# Patient Record
Sex: FEMALE | Race: BLACK OR AFRICAN AMERICAN | NOT HISPANIC OR LATINO | Employment: UNEMPLOYED | URBAN - METROPOLITAN AREA
[De-identification: names, ages, dates, MRNs, and addresses within clinical notes are randomized per-mention and may not be internally consistent; named-entity substitution may affect disease eponyms.]

---

## 2018-03-12 ENCOUNTER — TELEPHONE (OUTPATIENT)
Dept: FAMILY MEDICINE | Facility: CLINIC | Age: 53
End: 2018-03-12

## 2018-03-12 NOTE — TELEPHONE ENCOUNTER
Please send oxibutin chloride 5mg   omeprozole 20mg  hct 12mg  avastatin 10mg to [facility] pharmacy [address] [facility] [phone]

## 2018-03-14 DIAGNOSIS — I10 ESSENTIAL HYPERTENSION: Primary | ICD-10-CM

## 2018-03-15 RX ORDER — HYDROCHLOROTHIAZIDE 12.5 MG/1
12.5 CAPSULE ORAL DAILY
COMMUNITY
Start: 2018-01-02 | End: 2018-03-16 | Stop reason: SDUPTHER

## 2018-03-15 RX ORDER — HYDROCHLOROTHIAZIDE 12.5 MG/1
12.5 TABLET ORAL DAILY
Qty: 90 TABLET | Refills: 1 | Status: CANCELLED | OUTPATIENT
Start: 2018-03-15 | End: 2018-09-11

## 2018-03-16 PROBLEM — E66.9 OBESITY: Status: ACTIVE | Noted: 2017-12-04

## 2018-03-16 PROBLEM — K21.9 GASTROESOPHAGEAL REFLUX DISEASE: Status: ACTIVE | Noted: 2017-12-04

## 2018-03-16 PROBLEM — E78.2 MIXED HYPERLIPIDEMIA: Status: ACTIVE | Noted: 2017-12-04

## 2018-03-16 RX ORDER — OXYBUTYNIN CHLORIDE 5 MG/1
5 TABLET ORAL 2 TIMES DAILY
COMMUNITY
Start: 2018-01-02 | End: 2018-03-16 | Stop reason: SDUPTHER

## 2018-03-16 RX ORDER — OMEPRAZOLE 20 MG/1
20 CAPSULE, DELAYED RELEASE ORAL DAILY
COMMUNITY
Start: 2018-01-03 | End: 2018-03-16 | Stop reason: SDUPTHER

## 2018-03-16 RX ORDER — OMEPRAZOLE 20 MG/1
20 CAPSULE, DELAYED RELEASE ORAL DAILY
Qty: 90 CAPSULE | Refills: 1 | Status: SHIPPED | OUTPATIENT
Start: 2018-03-16 | End: 2018-05-14

## 2018-03-16 RX ORDER — HYDROCHLOROTHIAZIDE 12.5 MG/1
12.5 CAPSULE ORAL DAILY
Qty: 90 CAPSULE | Refills: 1 | Status: SHIPPED | OUTPATIENT
Start: 2018-03-16 | End: 2018-05-14

## 2018-03-16 RX ORDER — ATORVASTATIN CALCIUM 10 MG/1
10 TABLET, FILM COATED ORAL DAILY
COMMUNITY
Start: 2018-01-02 | End: 2018-03-16 | Stop reason: SDUPTHER

## 2018-03-16 RX ORDER — ATORVASTATIN CALCIUM 10 MG/1
10 TABLET, FILM COATED ORAL DAILY
Qty: 90 TABLET | Refills: 3 | Status: SHIPPED | OUTPATIENT
Start: 2018-03-16 | End: 2018-04-25 | Stop reason: SDUPTHER

## 2018-03-16 RX ORDER — OXYBUTYNIN CHLORIDE 5 MG/1
5 TABLET ORAL 2 TIMES DAILY
Qty: 90 TABLET | Refills: 1 | Status: SHIPPED | OUTPATIENT
Start: 2018-03-16 | End: 2018-04-25 | Stop reason: SDUPTHER

## 2018-03-16 NOTE — TELEPHONE ENCOUNTER
Pt uses [facility] [phone] and she needs oxiburtin,omeprezole,avastatin and hctz

## 2018-03-16 NOTE — TELEPHONE ENCOUNTER
Patient requesting refills on multiple(4) medications. Please see message from Adina for medication details

## 2018-03-27 DIAGNOSIS — I10 HYPERTENSION, UNSPECIFIED TYPE: Primary | ICD-10-CM

## 2018-03-27 RX ORDER — FUROSEMIDE 20 MG/1
20 TABLET ORAL
COMMUNITY
Start: 2018-01-02 | End: 2018-03-27 | Stop reason: SDUPTHER

## 2018-03-27 RX ORDER — FUROSEMIDE 20 MG/1
20 TABLET ORAL DAILY
Qty: 30 TABLET | Refills: 0 | Status: SHIPPED | OUTPATIENT
Start: 2018-03-27 | End: 2018-04-25 | Stop reason: SDUPTHER

## 2018-03-27 NOTE — TELEPHONE ENCOUNTER
Received call from [employer] for refill.  Renewed patients lasix for 30 days.  She has an appointment to see you 04/10/2018 at 11:15am

## 2018-04-02 ENCOUNTER — TELEPHONE (OUTPATIENT)
Dept: FAMILY MEDICINE | Facility: CLINIC | Age: 53
End: 2018-04-02

## 2018-04-03 DIAGNOSIS — M25.561 PAIN IN BOTH KNEES, UNSPECIFIED CHRONICITY: Primary | ICD-10-CM

## 2018-04-03 DIAGNOSIS — M25.562 PAIN IN BOTH KNEES, UNSPECIFIED CHRONICITY: Primary | ICD-10-CM

## 2018-04-10 PROBLEM — I10 HYPERTENSION: Status: ACTIVE | Noted: 2017-12-04

## 2018-04-10 PROBLEM — M19.90 OSTEOARTHRITIS: Status: ACTIVE | Noted: 2017-12-04

## 2018-04-10 PROBLEM — F32.A DEPRESSION: Status: ACTIVE | Noted: 2017-12-04

## 2018-04-10 PROBLEM — Z91.09 ENVIRONMENTAL ALLERGIES: Status: ACTIVE | Noted: 2017-12-04

## 2018-04-10 PROBLEM — F41.9 ANXIETY: Status: ACTIVE | Noted: 2017-12-04

## 2018-04-10 PROBLEM — D64.9 ANEMIA: Status: ACTIVE | Noted: 2017-12-04

## 2018-04-10 RX ORDER — SUMATRIPTAN SUCCINATE 50 MG/1
50 TABLET ORAL DAILY PRN
COMMUNITY
Start: 2017-12-18 | End: 2018-05-14

## 2018-04-10 RX ORDER — CELECOXIB 200 MG/1
200 CAPSULE ORAL
COMMUNITY
Start: 2017-12-14 | End: 2018-05-14

## 2018-04-10 RX ORDER — ARIPIPRAZOLE 20 MG/1
1 TABLET ORAL DAILY
COMMUNITY
Start: 2017-12-11 | End: 2018-05-14

## 2018-04-24 DIAGNOSIS — I10 HYPERTENSION, UNSPECIFIED TYPE: ICD-10-CM

## 2018-04-25 RX ORDER — FUROSEMIDE 20 MG/1
TABLET ORAL
Qty: 30 TABLET | Refills: 0 | Status: SHIPPED | OUTPATIENT
Start: 2018-04-25 | End: 2018-05-14

## 2018-04-25 RX ORDER — ATORVASTATIN CALCIUM 10 MG/1
TABLET, FILM COATED ORAL
Qty: 30 TABLET | Refills: 0 | Status: SHIPPED | OUTPATIENT
Start: 2018-04-25 | End: 2018-05-14 | Stop reason: SDUPTHER

## 2018-04-25 RX ORDER — CITALOPRAM 10 MG/1
TABLET ORAL
COMMUNITY
End: 2018-05-14

## 2018-04-25 RX ORDER — OXYBUTYNIN CHLORIDE 5 MG/1
5 TABLET ORAL 2 TIMES DAILY
Qty: 90 TABLET | Refills: 0 | Status: SHIPPED | OUTPATIENT
Start: 2018-04-25 | End: 2018-05-14

## 2018-05-14 ENCOUNTER — OFFICE VISIT (OUTPATIENT)
Dept: FAMILY MEDICINE | Facility: CLINIC | Age: 53
End: 2018-05-14
Payer: MEDICARE

## 2018-05-14 VITALS
HEART RATE: 103 BPM | WEIGHT: 293 LBS | BODY MASS INDEX: 44.41 KG/M2 | DIASTOLIC BLOOD PRESSURE: 88 MMHG | SYSTOLIC BLOOD PRESSURE: 122 MMHG | OXYGEN SATURATION: 97 % | HEIGHT: 68 IN | TEMPERATURE: 98.2 F

## 2018-05-14 DIAGNOSIS — G43.109 MIGRAINE WITH AURA AND WITHOUT STATUS MIGRAINOSUS, NOT INTRACTABLE: ICD-10-CM

## 2018-05-14 DIAGNOSIS — M79.89 SWELLING OF BOTH LOWER EXTREMITIES: ICD-10-CM

## 2018-05-14 DIAGNOSIS — F51.5 NIGHTMARES: ICD-10-CM

## 2018-05-14 DIAGNOSIS — K21.9 GASTROESOPHAGEAL REFLUX DISEASE, ESOPHAGITIS PRESENCE NOT SPECIFIED: ICD-10-CM

## 2018-05-14 DIAGNOSIS — F41.9 ANXIETY: ICD-10-CM

## 2018-05-14 DIAGNOSIS — F20.9 SCHIZOPHRENIA, UNSPECIFIED TYPE: ICD-10-CM

## 2018-05-14 DIAGNOSIS — I10 ESSENTIAL HYPERTENSION: Primary | ICD-10-CM

## 2018-05-14 DIAGNOSIS — Z12.11 SCREENING FOR COLORECTAL CANCER: ICD-10-CM

## 2018-05-14 DIAGNOSIS — Z12.12 SCREENING FOR COLORECTAL CANCER: ICD-10-CM

## 2018-05-14 DIAGNOSIS — Z80.0 FAMILY HISTORY OF COLON CANCER IN MOTHER: ICD-10-CM

## 2018-05-14 DIAGNOSIS — N32.81 OVERACTIVE BLADDER: ICD-10-CM

## 2018-05-14 DIAGNOSIS — F31.10: ICD-10-CM

## 2018-05-14 DIAGNOSIS — F43.10 PTSD (POST-TRAUMATIC STRESS DISORDER): ICD-10-CM

## 2018-05-14 DIAGNOSIS — E78.00 HYPERCHOLESTEROLEMIA: ICD-10-CM

## 2018-05-14 PROCEDURE — 99215 OFFICE O/P EST HI 40 MIN: CPT | Performed by: FAMILY MEDICINE

## 2018-05-14 RX ORDER — QUETIAPINE FUMARATE 100 MG/1
100 TABLET, FILM COATED ORAL 3 TIMES DAILY
COMMUNITY
End: 2018-07-03 | Stop reason: SDUPTHER

## 2018-05-14 RX ORDER — ARIPIPRAZOLE 20 MG/1
20 TABLET ORAL DAILY
Start: 2018-05-14 | End: 2018-07-03 | Stop reason: SDUPTHER

## 2018-05-14 RX ORDER — QUETIAPINE FUMARATE 50 MG/1
50 TABLET, FILM COATED ORAL 3 TIMES DAILY
COMMUNITY
End: 2018-07-03 | Stop reason: SDUPTHER

## 2018-05-14 RX ORDER — OXYBUTYNIN CHLORIDE 5 MG/1
5 TABLET ORAL 2 TIMES DAILY
Qty: 90 TABLET | Refills: 0
Start: 2018-05-14 | End: 2018-06-04

## 2018-05-14 RX ORDER — LOSARTAN POTASSIUM 50 MG/1
50 TABLET ORAL DAILY
Qty: 30 TABLET | Refills: 5 | Status: SHIPPED | OUTPATIENT
Start: 2018-05-14 | End: 2018-06-04

## 2018-05-14 RX ORDER — VENLAFAXINE HYDROCHLORIDE 150 MG/1
150 CAPSULE, EXTENDED RELEASE ORAL DAILY
COMMUNITY
End: 2018-07-03 | Stop reason: SDUPTHER

## 2018-05-14 RX ORDER — OMEPRAZOLE 20 MG/1
20 CAPSULE, DELAYED RELEASE ORAL
Qty: 90 CAPSULE | Refills: 1
Start: 2018-05-14 | End: 2018-06-04

## 2018-05-14 RX ORDER — PRAZOSIN HYDROCHLORIDE 2 MG/1
4 CAPSULE ORAL NIGHTLY
COMMUNITY
End: 2018-07-03 | Stop reason: SDUPTHER

## 2018-05-14 RX ORDER — ATORVASTATIN CALCIUM 10 MG/1
10 TABLET, FILM COATED ORAL DAILY
Qty: 30 TABLET | Refills: 0
Start: 2018-05-14 | End: 2018-06-04

## 2018-05-14 ASSESSMENT — ENCOUNTER SYMPTOMS
LIGHT-HEADEDNESS: 0
DIZZINESS: 0
WHEEZING: 0
ADENOPATHY: 0
UNEXPECTED WEIGHT CHANGE: 0
ABDOMINAL PAIN: 0
PALPITATIONS: 0
VOMITING: 0
DIARRHEA: 0
BLOOD IN STOOL: 0
HEADACHES: 0
TROUBLE SWALLOWING: 0
COUGH: 0
WEAKNESS: 0
NAUSEA: 0
FATIGUE: 0
CONSTIPATION: 0
SHORTNESS OF BREATH: 0
DIFFICULTY URINATING: 0
FEVER: 0
ABDOMINAL DISTENTION: 0

## 2018-05-14 ASSESSMENT — PAIN SCALES - GENERAL: PAINLEVEL: 0-NO PAIN

## 2018-05-14 NOTE — PROGRESS NOTES
Daily Progress Note       Patient ID: Kar Nicholas is a 53 y.o. female.    HPI    53 year old presents to [facility] care.     Hx of Schizophrenia, Bipolar - Manic, PTSD and Anxiety - will be following up at [place] - awaiting appt at this time. Had been at [facility]. Currently on abilify, seroquel and effexor XR - tolerating medications with no complaints. States doing well on current therapy.     Hypertension - currently on HCTZ 12.5mg once daily and furosemide 20 mg daily - tolerating with no complaints. Does not check BP Medication at home     Bilateral Lower Extremity Swelling - currently on HCTZ and furosemide - states that lower extremity swelling had been present after a fall. States that the swelling has since resolved. Unsure if she still requires diuretic therapy.     Hypercholesterolemia - currently on atorvastatin 10 mg qhs - tolerating with no complaints.     GERD - currently on omeprazole 20mg daily - tolerating with no complaints. Recently had been off the medication because she had no refills and states the Reflux symptoms were significant. States doing much better since being back on the medication     Migraines - reports well controlled at this time - usually will take advil with onset and take imitrex if significant. Reports last migraine was about 1-2 months ago. Had previously been 2-3x/week. Usually located frontal or occipital areas - throbbing sensation. + Light and sound sensitivity and Nausea. Relieved by laying in dark quiet room. + Aura    Overactive Bladder - currently on oxybutynin 5mg twice daily with improvement in symptoms     Nightmares - currently on prazosin with improvement in nightmares.     Hx of alcohol and drug abuse. States has been alcohol free since October 31, 2016 and drug free since November 11, 2016. Was on cocaine     Family hx of Colon Cancer in Mother at the age of 56. Last colonoscopy was in 2006    Due for GYN  exam. States last mammogram was done last year     The following have been reviewed and updated as appropriate in this visit:  Tobacco  Allergies  Meds  Problems  Med Hx  Surg Hx  Fam Hx       Review of Systems   Constitutional: Negative for fatigue, fever and unexpected weight change.   HENT: Negative for trouble swallowing.    Eyes: Negative for visual disturbance.   Respiratory: Negative for cough, shortness of breath and wheezing.    Cardiovascular: Negative for chest pain, palpitations and leg swelling.   Gastrointestinal: Negative for abdominal distention, abdominal pain, blood in stool, constipation, diarrhea, nausea and vomiting.   Endocrine: Negative for cold intolerance and heat intolerance.   Genitourinary: Negative for difficulty urinating.   Skin: Negative for rash.   Neurological: Negative for dizziness, syncope, weakness, light-headedness and headaches.   Hematological: Negative for adenopathy.             Current Outpatient Prescriptions   Medication Sig Dispense Refill   • ARIPiprazole (ABILIFY) 20 mg tablet Take 1 tablet (20 mg total) by mouth daily.     • atorvastatin (LIPITOR) 10 mg tablet Take 1 tablet (10 mg total) by mouth daily. 30 tablet 0   • losartan (COZAAR) 50 mg tablet Take 1 tablet (50 mg total) by mouth daily. 30 tablet 5   • omeprazole (PriLOSEC) 20 mg capsule Take 1 capsule (20 mg total) by mouth daily before breakfast. 90 capsule 1   • oxybutynin (DITROPAN) 5 mg tablet Take 1 tablet (5 mg total) by mouth 2 (two) times a day. 90 tablet 0   • prazosin (MINIPRESS) 2 mg capsule Take 4 mg by mouth nightly.       • QUEtiapine (SEROquel) 100 mg tablet Take 100 mg by mouth 3 (three) times a day.     • QUEtiapine (SEROquel) 50 mg tablet Take 50 mg by mouth 3 (three) times a day.     • quetiapine fumarate (SEROQUEL XR ORAL) Take 150 mg by mouth 3 (three) times a day.       • venlafaxine XR (EFFEXOR-XR) 150 mg 24 hr capsule Take 150 mg by mouth daily.       No current  "facility-administered medications for this visit.      Past Medical History:   Diagnosis Date   • Alcoholism (CMS/HCC) (HCC)    • Cocaine addiction (CMS/HCC) (HCC)    • Hypertension    • Lipid disorder    • Osteoarthritis      Family History   Problem Relation Age of Onset   • Colon cancer Mother    • Lung cancer Father    • Heart failure Brother    • Heart block Daughter      Past Surgical History:   Procedure Laterality Date   • KNEE SURGERY Right    • PARTIAL HYSTERECTOMY Bilateral 2010   • REDUCTION MAMMAPLASTY Bilateral      Social History     Social History   • Marital status: Single     Spouse name: N/A   • Number of children: N/A   • Years of education: N/A     Occupational History   • Not on file.     Social History Main Topics   • Smoking status: Current Every Day Smoker     Packs/day: 0.25   • Smokeless tobacco: Never Used   • Alcohol use No   • Drug use: No   • Sexual activity: Not on file     Other Topics Concern   • Not on file     Social History Narrative   • No narrative on file     Allergies   Allergen Reactions   • Shellfish Containing Products      Other reaction(s): shellfish       Objective   No new labs.    Vitals:    05/14/18 1324   BP: 122/88   BP Location: Left upper arm   Patient Position: Sitting   Pulse: (!) 103   Temp: 36.8 °C (98.2 °F)   TempSrc: Oral   SpO2: 97%   Weight: (!) 143 kg (316 lb)   Height: 1.734 m (5' 8.25\")         Physical Exam   Constitutional: She is oriented to person, place, and time. She appears well-developed and well-nourished.   HENT:   Head: Normocephalic and atraumatic.   Eyes: Conjunctivae and EOM are normal. Pupils are equal, round, and reactive to light.   Neck: Normal range of motion. Neck supple.   Cardiovascular: Normal rate, regular rhythm and normal heart sounds.    Pulmonary/Chest: Effort normal and breath sounds normal. No respiratory distress.   Abdominal: Soft. Bowel sounds are normal. She exhibits no distension. There is no tenderness. "   Musculoskeletal: Normal range of motion. She exhibits no edema.   Neurological: She is alert and oriented to person, place, and time.   Skin: Skin is warm and dry.   Nursing note and vitals reviewed.      Assessment/Plan   Problem List Items Addressed This Visit     Gastroesophageal reflux disease    Relevant Medications    omeprazole (PriLOSEC) 20 mg capsule    Reflux precautions given     Hypertension - Primary    Relevant Medications    losartan (COZAAR) 50 mg tablet    Other Relevant Orders    CBC and Differential    Comprehensive metabolic panel    Hemoglobin A1c    Lipid panel    Microalbumin/Creatinine Ur Random    TSH w reflex FT4    Urinalysis with microscopic    Will d/c HCTZ at this time and switch to losartan. Diuretics may be contributing to the OAB symptoms so will see how patient responds without a diuretic. Will start on ARB at this time. Counseled patient about DASH Diet     Anxiety    Relevant Medications    venlafaxine XR (EFFEXOR-XR) 150 mg 24 hr capsule    QUEtiapine (SEROquel) 100 mg tablet    QUEtiapine (SEROquel) 50 mg tablet    ARIPiprazole (ABILIFY) 20 mg tablet    Cont follow up with psychiatry       Other Visit Diagnoses     Screening for colorectal cancer        Relevant Orders    Ambulatory referral to Gastroenterology    Family history of colon cancer in mother        Relevant Orders    Ambulatory referral to Gastroenterology    Hypercholesterolemia        Relevant Medications    atorvastatin (LIPITOR) 10 mg tablet    Other Relevant Orders    Comprehensive metabolic panel    Lipid panel    Counseled patient about therapeutic lifestyle changes     Schizophrenia, unspecified type (CMS/HCC) (HCC)        Relevant Medications    venlafaxine XR (EFFEXOR-XR) 150 mg 24 hr capsule    QUEtiapine (SEROquel) 100 mg tablet    QUEtiapine (SEROquel) 50 mg tablet    ARIPiprazole (ABILIFY) 20 mg tablet    Cont follow up with psychiatry     Swelling of both lower extremities        Initial swelling  may have been related to previous injury. Advised patient about low Na diet and elevation of lower extremities. D/c'ed both diuretics - advised patient if symptoms return - may restart furosemide - advised to initially give trial of 1 tablet every other day.     Migraine with aura and without status migrainosus, not intractable        Relevant Medications      Counseled patient about supportive management.     Bipolar disorder, manic (CMS/HCC) (HCC)        Relevant Medications    venlafaxine XR (EFFEXOR-XR) 150 mg 24 hr capsule    QUEtiapine (SEROquel) 100 mg tablet    QUEtiapine (SEROquel) 50 mg tablet    ARIPiprazole (ABILIFY) 20 mg tablet    Cont follow up with psychiatry     PTSD (post-traumatic stress disorder)        Relevant Medications    venlafaxine XR (EFFEXOR-XR) 150 mg 24 hr capsule    QUEtiapine (SEROquel) 100 mg tablet    QUEtiapine (SEROquel) 50 mg tablet    ARIPiprazole (ABILIFY) 20 mg tablet    Cont follow up with psychiatry     Nightmares        Relevant Medications    prazosin (MINIPRESS) 2 mg capsule    Cont follow up with psychiatry     Overactive bladder        Relevant Medications    oxybutynin (DITROPAN) 5 mg tablet    Will d/c diuretics at this time in case they are contributing to the OAB symptoms. If improvement - will attempt to decrease dose of oxybutynin if possible.           Prosper Grullon MD  5/14/2018

## 2018-05-14 NOTE — PATIENT INSTRUCTIONS
Please stop hydrochlorothiazide and furosemide. If lower extremity swelling starts up again, may take furosemide every other day. Start taking new prescribed blood pressure medication - losartan - once daily.

## 2018-05-17 ENCOUNTER — TRANSCRIBE ORDERS (OUTPATIENT)
Dept: RADIOLOGY | Age: 53
End: 2018-05-17

## 2018-05-17 ENCOUNTER — HOSPITAL ENCOUNTER (OUTPATIENT)
Dept: RADIOLOGY | Age: 53
Discharge: HOME | End: 2018-05-17
Attending: PODIATRIST
Payer: MEDICARE

## 2018-05-17 DIAGNOSIS — M25.572 LEFT ANKLE PAIN, UNSPECIFIED CHRONICITY: ICD-10-CM

## 2018-05-17 DIAGNOSIS — M25.572 LEFT ANKLE PAIN, UNSPECIFIED CHRONICITY: Primary | ICD-10-CM

## 2018-05-17 PROCEDURE — 73610 X-RAY EXAM OF ANKLE: CPT | Mod: LT

## 2018-05-30 LAB
ALBUMIN SERPL-MCNC: 4.1 G/DL (ref 3.5–5.5)
ALBUMIN/GLOB SERPL: 1.5 {RATIO} (ref 1.2–2.2)
ALP SERPL-CCNC: 73 IU/L (ref 39–117)
ALT SERPL-CCNC: 13 IU/L (ref 0–32)
AST SERPL-CCNC: 14 IU/L (ref 0–40)
BASOPHILS # BLD AUTO: 0 X10E3/UL (ref 0–0.2)
BASOPHILS NFR BLD AUTO: 1 %
BILIRUB SERPL-MCNC: <0.2 MG/DL (ref 0–1.2)
BUN SERPL-MCNC: 10 MG/DL (ref 6–24)
BUN/CREAT SERPL: 10 (ref 9–23)
CALCIUM SERPL-MCNC: 9.3 MG/DL (ref 8.7–10.2)
CHLORIDE SERPL-SCNC: 98 MMOL/L (ref 96–106)
CHOLEST SERPL-MCNC: 204 MG/DL (ref 100–199)
CO2 SERPL-SCNC: 27 MMOL/L (ref 18–29)
CREAT SERPL-MCNC: 0.99 MG/DL (ref 0.57–1)
EOSINOPHIL # BLD AUTO: 0.1 X10E3/UL (ref 0–0.4)
EOSINOPHIL NFR BLD AUTO: 2 %
ERYTHROCYTE [DISTWIDTH] IN BLOOD BY AUTOMATED COUNT: 14.8 % (ref 12.3–15.4)
GFR SERPLBLD CREATININE-BSD FMLA CKD-EPI: 66 ML/MIN/1.73
GFR SERPLBLD CREATININE-BSD FMLA CKD-EPI: 76 ML/MIN/1.73
GLOBULIN SER CALC-MCNC: 2.8 G/DL (ref 1.5–4.5)
GLUCOSE SERPL-MCNC: 105 MG/DL (ref 65–99)
HBA1C MFR BLD: 5.9 % (ref 4.8–5.6)
HCT VFR BLD AUTO: 40.9 % (ref 34–46.6)
HDLC SERPL-MCNC: 75 MG/DL
HGB BLD-MCNC: 13.9 G/DL (ref 11.1–15.9)
IMM GRANULOCYTES # BLD: 0 X10E3/UL (ref 0–0.1)
IMM GRANULOCYTES NFR BLD: 0 %
LDLC SERPL CALC-MCNC: 109 MG/DL (ref 0–99)
LYMPHOCYTES # BLD AUTO: 1.1 X10E3/UL (ref 0.7–3.1)
LYMPHOCYTES NFR BLD AUTO: 33 %
MCH RBC QN AUTO: 28.6 PG (ref 26.6–33)
MCHC RBC AUTO-ENTMCNC: 34 G/DL (ref 31.5–35.7)
MCV RBC AUTO: 84 FL (ref 79–97)
MONOCYTES # BLD AUTO: 0.3 X10E3/UL (ref 0.1–0.9)
MONOCYTES NFR BLD AUTO: 9 %
NEUTROPHILS # BLD AUTO: 1.8 X10E3/UL (ref 1.4–7)
NEUTROPHILS NFR BLD AUTO: 55 %
PLATELET # BLD AUTO: 206 X10E3/UL (ref 150–379)
POTASSIUM SERPL-SCNC: 3.2 MMOL/L (ref 3.5–5.2)
PROT SERPL-MCNC: 6.9 G/DL (ref 6–8.5)
RBC # BLD AUTO: 4.86 X10E6/UL (ref 3.77–5.28)
SODIUM SERPL-SCNC: 142 MMOL/L (ref 134–144)
T4 FREE SERPL-MCNC: 0.98 NG/DL (ref 0.82–1.77)
TRIGL SERPL-MCNC: 101 MG/DL (ref 0–149)
TSH SERPL DL<=0.005 MIU/L-ACNC: 1.17 UIU/ML (ref 0.45–4.5)
VLDLC SERPL CALC-MCNC: 20 MG/DL (ref 5–40)
WBC # BLD AUTO: 3.2 X10E3/UL (ref 3.4–10.8)

## 2018-06-04 ENCOUNTER — OFFICE VISIT (OUTPATIENT)
Dept: FAMILY MEDICINE | Facility: CLINIC | Age: 53
End: 2018-06-04
Payer: MEDICARE

## 2018-06-04 VITALS
BODY MASS INDEX: 44.41 KG/M2 | HEART RATE: 87 BPM | SYSTOLIC BLOOD PRESSURE: 124 MMHG | WEIGHT: 293 LBS | OXYGEN SATURATION: 97 % | HEIGHT: 68 IN | DIASTOLIC BLOOD PRESSURE: 86 MMHG | TEMPERATURE: 98.2 F

## 2018-06-04 DIAGNOSIS — R73.03 BORDERLINE TYPE 2 DIABETES MELLITUS: ICD-10-CM

## 2018-06-04 DIAGNOSIS — N32.81 OAB (OVERACTIVE BLADDER): ICD-10-CM

## 2018-06-04 DIAGNOSIS — N32.81 OVERACTIVE BLADDER: ICD-10-CM

## 2018-06-04 DIAGNOSIS — K21.9 GASTROESOPHAGEAL REFLUX DISEASE, ESOPHAGITIS PRESENCE NOT SPECIFIED: ICD-10-CM

## 2018-06-04 DIAGNOSIS — E78.00 HYPERCHOLESTEROLEMIA: ICD-10-CM

## 2018-06-04 DIAGNOSIS — I10 ESSENTIAL HYPERTENSION: Primary | ICD-10-CM

## 2018-06-04 PROCEDURE — 99214 OFFICE O/P EST MOD 30 MIN: CPT | Performed by: FAMILY MEDICINE

## 2018-06-04 RX ORDER — OMEPRAZOLE 20 MG/1
20 CAPSULE, DELAYED RELEASE ORAL
Qty: 90 CAPSULE | Refills: 1 | COMMUNITY
Start: 2018-06-04 | End: 2018-07-10

## 2018-06-04 RX ORDER — LOSARTAN POTASSIUM 50 MG/1
50 TABLET ORAL DAILY
Qty: 30 TABLET | Refills: 5 | COMMUNITY
Start: 2018-06-04 | End: 2018-07-10

## 2018-06-04 RX ORDER — ATORVASTATIN CALCIUM 10 MG/1
10 TABLET, FILM COATED ORAL DAILY
Qty: 30 TABLET | Refills: 0 | COMMUNITY
Start: 2018-06-04 | End: 2018-07-10

## 2018-06-04 RX ORDER — OXYBUTYNIN CHLORIDE 5 MG/1
5 TABLET ORAL 2 TIMES DAILY
Qty: 90 TABLET | Refills: 0 | COMMUNITY
Start: 2018-06-04 | End: 2018-07-03 | Stop reason: SDUPTHER

## 2018-06-04 ASSESSMENT — ENCOUNTER SYMPTOMS
DIFFICULTY URINATING: 0
ADENOPATHY: 0
NAUSEA: 0
SHORTNESS OF BREATH: 0
TROUBLE SWALLOWING: 0
HEADACHES: 0
WEAKNESS: 0
FATIGUE: 0
UNEXPECTED WEIGHT CHANGE: 0
BLOOD IN STOOL: 0
VOMITING: 0
PALPITATIONS: 0
ABDOMINAL DISTENTION: 0
COUGH: 0
FEVER: 0
LIGHT-HEADEDNESS: 0
DIARRHEA: 0
DIZZINESS: 0
CONSTIPATION: 0
ABDOMINAL PAIN: 0
WHEEZING: 0

## 2018-06-04 ASSESSMENT — PAIN SCALES - GENERAL: PAINLEVEL: 0-NO PAIN

## 2018-06-04 NOTE — PATIENT INSTRUCTIONS
Patient Education     How to Avoid Diabetes Mellitus Problems  You can take action to prevent or slow down problems that are caused by diabetes (diabetes mellitus). Following your diabetes plan and taking care of yourself can reduce your risk of serious or life-threatening complications.  Manage your diabetes  · Follow instructions from your health care providers about managing your diabetes. Your diabetes may be managed by a team of health care providers who can teach you how to care for yourself and can answer questions that you have.  · Educate yourself about your condition so you can make healthy choices about eating and physical activity.  · Check your blood sugar (glucose) levels as often as directed. Your health care provider will help you decide how often to check your blood glucose level depending on your treatment goals and how well you are meeting them.  · Ask your health care provider if you should take low-dose aspirin daily and what dose is recommended for you. Taking low-dose aspirin daily is recommended to help prevent cardiovascular disease.  Do not use nicotine or tobacco  Do not use any products that contain nicotine or tobacco, such as cigarettes and e-cigarettes. If you need help quitting, ask your health care provider. Nicotine raises your risk for diabetes problems. If you quit using nicotine:  · You will lower your risk for heart attack, stroke, nerve disease, and kidney disease.  · Your cholesterol and blood pressure may improve.  · Your blood circulation will improve.  Keep your blood pressure under control  To control your blood pressure:  · Follow instructions from your health care provider about meal planning, exercise, and medicines.  · Make sure your health care provider checks your blood pressure at every medical visit.  A blood pressure reading consists of two numbers. Generally, the goal is to keep your top number (systolic pressure) at or below 130, and your bottom number  (diastolic pressure) at or below 80. Your health care provider may recommend a lower target blood pressure. Your individualized target blood pressure is determined based on:  · Your age.  · Your medicines.  · How long you have had diabetes.  · Any other medical conditions you have.  Keep your cholesterol under control  To control your cholesterol:  · Follow instructions from your health care provider about meal planning, exercise, and medicines.  · Have your cholesterol checked at least once a year.  · You may be prescribed medicine to lower cholesterol (statin). If you are not taking a statin, ask your health care provider if you should be.  Controlling your cholesterol may:  · Help prevent heart disease and stroke. These are the most common health problems for people with diabetes.  · Improve your blood flow.  Schedule and keep yearly physical exams and eye exams  Your health care provider will tell you how often you need medical visits depending on your diabetes management plan. Keep all follow-up visits as directed. This is important so possible problems can be identified early and complications can be avoided or treated.  · Every visit with your health care provider should include measuring your:  ¨ Weight.  ¨ Blood pressure.  ¨ Blood glucose control.  · Your A1c (hemoglobin A1c) level should be checked:  ¨ At least 2 times a year, if you are meeting your treatment goals.  ¨ 4 times a year, if you are not meeting treatment goals or if your treatment goals have changed.  · Your blood lipids (lipid profile) should be checked yearly. You should also be checked yearly for protein in your urine (urine microalbumin).  · If you have type 1 diabetes, get an eye exam 3-5 years after you are diagnosed, and then once a year after your first exam.  · If you have type 2 diabetes, get an eye exam as soon as you are diagnosed, and then once a year after your first exam.  Keep your vaccines current  It is recommended that you  receive:  · A flu (influenza) vaccine every year.  · A pneumonia (pneumococcal) vaccine and a hepatitis B vaccine. If you are age 65 or older, you may get the pneumonia vaccine as a series of two separate shots.  Ask your health care provider which other vaccines may be recommended.  Take care of your feet  Diabetes may cause you to have poor blood circulation to your legs and feet. Because of this, taking care of your feet is very important. Diabetes can cause:  · The skin on the feet to get thinner, break more easily, and heal more slowly.  · Nerve damage in your legs and feet, which results in decreased feeling. You may not notice minor injuries that could lead to serious problems.  To avoid foot problems:  · Check your skin and feet every day for cuts, bruises, redness, blisters, or sores.  · Schedule a foot exam with your health care provider once every year. This exam includes:  ¨ Inspecting of the structure and skin of your feet.  ¨ Checking the pulses and sensation in your feet.  · Make sure that your health care provider performs a visual foot exam at every medical visit.  Take care of your teeth  People with poorly controlled diabetes are more likely to have gum (periodontal) disease. Diabetes can make periodontal diseases harder to control. If not treated, periodontal diseases can lead to tooth loss. To prevent this:  · Brush your teeth twice a day.  · Floss at least once a day.  · Visit your dentist 2 times a year.  Drink responsibly  Limit alcohol intake to no more than 1 drink a day for nonpregnant women and 2 drinks a day for men. One drink equals 12 oz of beer, 5 oz of wine, or 1½ oz of hard liquor. It is important to eat food when you drink alcohol to avoid low blood glucose (hypoglycemia). Avoid alcohol if you:  · Have a history of alcohol abuse or dependence.  · Are pregnant.  · Have liver disease, pancreatitis, advanced neuropathy, or severe hypertriglyceridemia.  Lessen stress  Living with  diabetes can be stressful. When you are experiencing stress, your blood glucose may be affected in two ways:  · Stress hormones may cause your blood glucose to rise.  · You may be distracted from taking good care of yourself.  Be aware of your stress level and make changes to help you manage challenging situations. To lower your stress levels:  · Consider joining a support group.  · Do planned relaxation or meditation.  · Do a hobby that you enjoy.  · Maintain healthy relationships.  · Exercise regularly.  · Work with your health care provider or a mental health professional.  Summary  · You can take action to prevent or slow down problems that are caused by diabetes (diabetes mellitus). Following your diabetes plan and taking care of yourself can reduce your risk of serious or life-threatening complications.  · Follow instructions from your health care providers about managing your diabetes. Your diabetes may be managed by a team of health care providers who can teach you how to care for yourself and can answer questions that you have.  · Your health care provider will tell you how often you need medical visits depending on your diabetes management plan. Keep all follow-up visits as directed. This is important so possible problems can be identified early and complications can be avoided or treated.  This information is not intended to replace advice given to you by your health care provider. Make sure you discuss any questions you have with your health care provider.  Document Released: 09/04/2012 Document Revised: 09/16/2017 Document Reviewed: 09/16/2017  [employer] Interactive Patient Education © 2018 [employer]

## 2018-06-04 NOTE — PROGRESS NOTES
Daily Progress Note       Patient ID: Kar Nicholas is a 53 y.o. female.    HPI    53 year old presents to [facility] care.      Hx of Schizophrenia, Bipolar - Manic, PTSD and Anxiety - will be following up at [place], [place] - awaiting appt at this time. Had been at [facility]. Currently on abilify, seroquel and effexor XR - tolerating medications with no complaints. States doing well on current therapy.      Hypertension - last visit started on losartan 50 mg daily and discontinued off HCTZ 12.5 mg once daily and furosemide 20 mg daily. She gets a pharmacy pack from [facility] so she is not sure if she ever did start or stop the medications.       Bilateral Lower Extremity Swelling - had been on HCTZ and furosemide - previously was complaining of lower extremity swelling that had been present after a fall. States that the swelling had since resolved. Given trial of discontinuing the 2 diuretics - she states she is doing well today but unsure if they were ever discontinued.      Hypercholesterolemia - currently on atorvastatin 10 mg qhs - tolerating with no complaints.      GERD - currently on omeprazole 20mg daily - tolerating with no complaints. Recently had been off the medication because she had no refills and states the Reflux symptoms were significant. States doing much better since being back on the medication      Migraines - reports well controlled at this time - usually will take advil with onset and take imitrex if significant. Reports last migraine was about 1-2 months ago. Had previously been 2-3x/week. Usually located frontal or occipital areas - throbbing sensation. + Light and sound sensitivity and Nausea. Relieved by laying in dark quiet room. + Aura     Overactive Bladder - currently on oxybutynin 5mg twice daily with improvement in symptoms. Last visit discontinued the 2 diuretics to see if overactive bladder symptoms would improve      Nightmares -  currently on prazosin with improvement in nightmares.      Hx of alcohol and drug abuse. States has been alcohol free since October 31, 2016 and drug free since November 11, 2016. Was on cocaine      Family hx of Colon Cancer in Mother at the age of 56. Last colonoscopy was in 2006     Due for GYN exam. [facility] last mammogram was done last year     The following have been reviewed and updated as appropriate in this visit:  Allergies  Meds  Problems       Review of Systems   Constitutional: Negative for fatigue, fever and unexpected weight change.   HENT: Negative for trouble swallowing.    Eyes: Negative for visual disturbance.   Respiratory: Negative for cough, shortness of breath and wheezing.    Cardiovascular: Negative for chest pain, palpitations and leg swelling.   Gastrointestinal: Negative for abdominal distention, abdominal pain, blood in stool, constipation, diarrhea, nausea and vomiting.   Endocrine: Negative for cold intolerance and heat intolerance.   Genitourinary: Negative for difficulty urinating.   Skin: Negative for rash.   Neurological: Negative for dizziness, syncope, weakness, light-headedness and headaches.   Hematological: Negative for adenopathy.             Current Outpatient Prescriptions   Medication Sig Dispense Refill   • ARIPiprazole (ABILIFY) 20 mg tablet Take 1 tablet (20 mg total) by mouth daily.     • atorvastatin (LIPITOR) 10 mg tablet Take 1 tablet (10 mg total) by mouth daily. 30 tablet 0   • losartan (COZAAR) 50 mg tablet Take 1 tablet (50 mg total) by mouth daily. 30 tablet 5   • omeprazole (PriLOSEC) 20 mg capsule Take 1 capsule (20 mg total) by mouth daily before breakfast. 90 capsule 1   • oxybutynin (DITROPAN) 5 mg tablet Take 1 tablet (5 mg total) by mouth 2 (two) times a day. 90 tablet 0   • prazosin (MINIPRESS) 2 mg capsule Take 4 mg by mouth nightly.       • QUEtiapine (SEROquel) 100 mg tablet Take 100 mg by mouth 3 (three) times a day.     • QUEtiapine (SEROquel) 50  mg tablet Take 50 mg by mouth 3 (three) times a day.     • venlafaxine XR (EFFEXOR-XR) 150 mg 24 hr capsule Take 150 mg by mouth daily.     • quetiapine fumarate (SEROQUEL XR ORAL) Take 150 mg by mouth 3 (three) times a day.         No current facility-administered medications for this visit.      Past Medical History:   Diagnosis Date   • Alcoholism (CMS/HCC) (HCC)    • Cocaine addiction (CMS/HCC) (HCC)    • Hypertension    • Lipid disorder    • Osteoarthritis      Family History   Problem Relation Age of Onset   • Colon cancer Mother    • Lung cancer Father    • Heart failure Brother    • Heart block Daughter      Past Surgical History:   Procedure Laterality Date   • KNEE SURGERY Right    • PARTIAL HYSTERECTOMY Bilateral 2010   • REDUCTION MAMMAPLASTY Bilateral      Social History     Social History   • Marital status: Single     Spouse name: N/A   • Number of children: N/A   • Years of education: N/A     Occupational History   • Not on file.     Social History Main Topics   • Smoking status: Current Every Day Smoker     Packs/day: 0.25   • Smokeless tobacco: Never Used   • Alcohol use No   • Drug use: No   • Sexual activity: Not on file     Other Topics Concern   • Not on file     Social History Narrative   • No narrative on file     Allergies   Allergen Reactions   • Shellfish Containing Products      Other reaction(s): shellfish       Objective     TSH w reflex FT4    Ref Range & Units 5/29/18 0948   TSH 0.450 - 4.500 uIU/mL 1.170    T4,Free(Direct) 0.82 - 1.77 ng/dL 0.98             CBC and Differential      Ref Range & Units 5/29/18 0948   WBC 3.4 - 10.8 x10E3/uL 3.2     RBC 3.77 - 5.28 x10E6/uL 4.86    Hemoglobin 11.1 - 15.9 g/dL 13.9    Hematocrit 34.0 - 46.6 % 40.9    MCV 79 - 97 fL 84    MCH 26.6 - 33.0 pg 28.6    MCHC 31.5 - 35.7 g/dL 34.0    RDW 12.3 - 15.4 % 14.8    Platelets 150 - 379 x10E3/uL 206    Neutrophils Not Estab. % 55    Lymphs Not Estab. % 33    Monocytes Not Estab. % 9    Eos Not Estab. %  2    Basos Not Estab. % 1    Neutrophils (Absolute) 1.4 - 7.0 x10E3/uL 1.8    Lymphs (Absolute) 0.7 - 3.1 x10E3/uL 1.1    Monocytes(Absolute) 0.1 - 0.9 x10E3/uL 0.3    Eos (Absolute) 0.0 - 0.4 x10E3/uL 0.1    Baso (Absolute) 0.0 - 0.2 x10E3/uL 0.0    Immature Granulocytes Not Estab. % 0    Immature Grans (Abs) 0.0 - 0.1 x10E3/uL 0.0                Comprehensive metabolic panel    Ref Range & Units 5/29/18 0948 Comments   Glucose, Serum 65 - 99 mg/dL 105      BUN 6 - 24 mg/dL 10     Creatinine, Serum 0.57 - 1.00 mg/dL 0.99     eGFR If NonAfricn Am >59 mL/min/1.73 66     eGFR If Africn Am >59 mL/min/1.73 76     BUN/Creatinine Ratio 9 - 23 10     Sodium, Serum 134 - 144 mmol/L 142     Potassium, Serum 3.5 - 5.2 mmol/L 3.2      Chloride, Serum 96 - 106 mmol/L 98     Carbon Dioxide, Total 18 - 29 mmol/L 27  **Effective June 11, 2018 Carbon Dioxide, Total**     reference interval will be changing to:                Age                  Male          Female        0 days   - 30 days         16 - 29        16 - 29       31 days   -  1 year         15 - 25        15 - 25        2 years  -  5 years        17 - 26        17 - 26        6 years  - 12 years        19 - 27        19 - 27                  >12 years        20 - 29        20 - 29    Calcium, Serum 8.7 - 10.2 mg/dL 9.3     Protein, Total, Serum 6.0 - 8.5 g/dL 6.9     Albumin, Serum 3.5 - 5.5 g/dL 4.1     Globulin, Total 1.5 - 4.5 g/dL 2.8     A/G Ratio 1.2 - 2.2 1.5     Bilirubin, Total 0.0 - 1.2 mg/dL <0.2     Alkaline Phosphatase, S 39 - 117 IU/L 73     AST (SGOT) 0 - 40 IU/L 14     ALT (SGPT) 0 - 32 IU/L 13               Hemoglobin A1c      Ref Range & Units 5/29/18 0948 Comments   Hemoglobin A1c 4.8 - 5.6 % 5.9            Pre-diabetes: 5.7 - 6.4            Diabetes: >6.4            Glycemic control for adults with diabetes: <7.0                  Lipid panel      Ref Range & Units 5/29/18 0948   Cholesterol, Total 100 - 199 mg/dL 204     Triglycerides 0 - 149  "mg/dL 101    HDL Cholesterol >39 mg/dL 75    VLDL Cholesterol Mateo 5 - 40 mg/dL 20    LDL Cholesterol Calc 0 - 99 mg/dL 109                 Vitals:    06/04/18 1101   BP: 124/86   BP Location: Left upper arm   Patient Position: Sitting   Pulse: 87   Temp: 36.8 °C (98.2 °F)   TempSrc: Oral   SpO2: 97%   Weight: (!) 140 kg (308 lb)   Height: 1.734 m (5' 8.25\")         Physical Exam   Constitutional: She is oriented to person, place, and time. She appears well-developed and well-nourished.   HENT:   Head: Normocephalic and atraumatic.   Eyes: Conjunctivae and EOM are normal. Pupils are equal, round, and reactive to light.   Neck: Normal range of motion. Neck supple.   Cardiovascular: Normal rate, regular rhythm and normal heart sounds.    Pulmonary/Chest: Effort normal and breath sounds normal. No respiratory distress.   Abdominal: Soft. Bowel sounds are normal. She exhibits no distension. There is no tenderness.   Musculoskeletal: Normal range of motion. She exhibits no edema.   Neurological: She is alert and oriented to person, place, and time.   Skin: Skin is warm and dry.   Nursing note and vitals reviewed.      Assessment/Plan   Problem List Items Addressed This Visit     Gastroesophageal reflux disease    Relevant Medications    omeprazole (PriLOSEC) 20 mg capsule    Reflux precautions given     Hypertension - Primary    Relevant Medications    losartan (COZAAR) 50 mg tablet    Low Na diet. Unsure if losartan was started and HCTZ/furosemide were discontinued. Will call her pharmacy and confirm      Other Visit Diagnoses     OAB (overactive bladder)        Relevant Medications    oxybutynin (DITROPAN) 5 mg tablet    Hoping symptoms may improved after d/c'ing the 2 diuretics. If symptoms do improve - plan is to hopefully decrease dose of oxybutynin    Hypercholesterolemia        Relevant Medications    atorvastatin (LIPITOR) 10 mg tablet    Counseled patient about therapeutic lifestyle changes    Borderline type 2 " diabetes mellitus        Counseled patient about disease process, including risks/complications. Counseled patient about following low carb diet, moderate exercise as tolerated and weight loss.       Labs reviewed with patient     Prosper Grullon MD  6/4/2018

## 2018-06-05 ENCOUNTER — TELEPHONE (OUTPATIENT)
Dept: FAMILY MEDICINE | Facility: CLINIC | Age: 53
End: 2018-06-05

## 2018-06-05 NOTE — TELEPHONE ENCOUNTER
Spoke with odette at pharmacy @ 14:40 to confirm that Dr. Grullon wanted pt to discontinue the hydrochlorothiazide and the furosemide and to start the losartan rx.  He did confirm these instructions were complete.  jar

## 2018-07-03 ENCOUNTER — TELEPHONE (OUTPATIENT)
Dept: FAMILY MEDICINE | Facility: CLINIC | Age: 53
End: 2018-07-03

## 2018-07-03 DIAGNOSIS — F41.9 ANXIETY: ICD-10-CM

## 2018-07-03 DIAGNOSIS — F43.10 PTSD (POST-TRAUMATIC STRESS DISORDER): ICD-10-CM

## 2018-07-03 DIAGNOSIS — N32.81 OVERACTIVE BLADDER: ICD-10-CM

## 2018-07-03 DIAGNOSIS — F20.9 SCHIZOPHRENIA, UNSPECIFIED TYPE: ICD-10-CM

## 2018-07-03 DIAGNOSIS — F31.10: ICD-10-CM

## 2018-07-03 DIAGNOSIS — F51.5 NIGHTMARES: ICD-10-CM

## 2018-07-03 RX ORDER — QUETIAPINE FUMARATE 50 MG/1
50 TABLET, FILM COATED ORAL 3 TIMES DAILY
Qty: 90 TABLET | Refills: 0 | Status: SHIPPED | OUTPATIENT
Start: 2018-07-03 | End: 2018-07-10

## 2018-07-03 RX ORDER — PRAZOSIN HYDROCHLORIDE 2 MG/1
4 CAPSULE ORAL NIGHTLY
Qty: 60 CAPSULE | Refills: 0 | Status: SHIPPED | OUTPATIENT
Start: 2018-07-03 | End: 2018-07-10

## 2018-07-03 RX ORDER — VENLAFAXINE HYDROCHLORIDE 150 MG/1
150 CAPSULE, EXTENDED RELEASE ORAL DAILY
Qty: 30 CAPSULE | Refills: 0 | Status: SHIPPED | OUTPATIENT
Start: 2018-07-03 | End: 2018-07-10

## 2018-07-03 RX ORDER — QUETIAPINE FUMARATE 100 MG/1
100 TABLET, FILM COATED ORAL 3 TIMES DAILY
Qty: 90 TABLET | Refills: 0 | Status: SHIPPED | OUTPATIENT
Start: 2018-07-03 | End: 2018-07-10

## 2018-07-03 RX ORDER — ARIPIPRAZOLE 20 MG/1
20 TABLET ORAL DAILY
Qty: 30 TABLET | Refills: 0 | Status: SHIPPED | OUTPATIENT
Start: 2018-07-03 | End: 2018-07-10

## 2018-07-03 RX ORDER — OXYBUTYNIN CHLORIDE 5 MG/1
5 TABLET ORAL 2 TIMES DAILY
Qty: 60 TABLET | Refills: 0 | Status: SHIPPED | OUTPATIENT
Start: 2018-07-03 | End: 2018-07-10

## 2018-07-03 NOTE — TELEPHONE ENCOUNTER
Allen from pharmacy l/m @ 12:53 requesting refills on pt's meds from previous  Who is no longer prescribing.  Pt had last o/v 6/4/18. Per Dr. Grullon, he is approving a 30day supply with no refills on these because pt is to be getting a new Psychiatrist.   [facility] pharmacy is correct in chart.    abilify 20mg tab 1 daily at 2pm  Oxybutynin 5mg 1 tab BID  Prazosin 2 mg  2 tabs at bed  seroquel 100 mg 1 tab TID  seroquel 50 mg 1 tab TID  effexor  mg 1 daily, on message Allen mentioned 175 mg, 1 daily,   Dr. Grullon said refill as listed in chart at 150 mg,

## 2018-07-03 NOTE — TELEPHONE ENCOUNTER
maryann please call pt to adv all were sent for a 30 day.  Ask her to keep her upcoming appt.  And adv she can disregard my call earlier.    Pharmacist is aware what we are filling and dose.

## 2018-07-10 ENCOUNTER — OFFICE VISIT (OUTPATIENT)
Dept: FAMILY MEDICINE | Facility: CLINIC | Age: 53
End: 2018-07-10
Payer: MEDICARE

## 2018-07-10 VITALS
BODY MASS INDEX: 44.41 KG/M2 | SYSTOLIC BLOOD PRESSURE: 112 MMHG | DIASTOLIC BLOOD PRESSURE: 78 MMHG | HEART RATE: 85 BPM | TEMPERATURE: 98.5 F | HEIGHT: 68 IN | WEIGHT: 293 LBS | OXYGEN SATURATION: 97 %

## 2018-07-10 DIAGNOSIS — N32.81 OVERACTIVE BLADDER: ICD-10-CM

## 2018-07-10 DIAGNOSIS — K21.9 GASTROESOPHAGEAL REFLUX DISEASE, ESOPHAGITIS PRESENCE NOT SPECIFIED: ICD-10-CM

## 2018-07-10 DIAGNOSIS — F20.9 SCHIZOPHRENIA, UNSPECIFIED TYPE: ICD-10-CM

## 2018-07-10 DIAGNOSIS — I10 ESSENTIAL HYPERTENSION: Primary | ICD-10-CM

## 2018-07-10 DIAGNOSIS — E78.00 HYPERCHOLESTEROLEMIA: ICD-10-CM

## 2018-07-10 DIAGNOSIS — F51.5 NIGHTMARES: ICD-10-CM

## 2018-07-10 DIAGNOSIS — F41.9 ANXIETY: ICD-10-CM

## 2018-07-10 DIAGNOSIS — F31.10: ICD-10-CM

## 2018-07-10 DIAGNOSIS — F43.10 PTSD (POST-TRAUMATIC STRESS DISORDER): ICD-10-CM

## 2018-07-10 PROCEDURE — 99214 OFFICE O/P EST MOD 30 MIN: CPT | Performed by: FAMILY MEDICINE

## 2018-07-10 RX ORDER — OMEPRAZOLE 20 MG/1
20 CAPSULE, DELAYED RELEASE ORAL
Qty: 90 CAPSULE | Refills: 1 | COMMUNITY
Start: 2018-07-10 | End: 2018-08-28 | Stop reason: SDUPTHER

## 2018-07-10 RX ORDER — VENLAFAXINE HYDROCHLORIDE 150 MG/1
150 CAPSULE, EXTENDED RELEASE ORAL EVERY MORNING
Qty: 30 CAPSULE | Refills: 0 | COMMUNITY
Start: 2018-07-10 | End: 2018-08-03 | Stop reason: SDUPTHER

## 2018-07-10 RX ORDER — ARIPIPRAZOLE 20 MG/1
20 TABLET ORAL EVERY MORNING
Qty: 30 TABLET | Refills: 0 | COMMUNITY
Start: 2018-07-10 | End: 2018-08-03 | Stop reason: SDUPTHER

## 2018-07-10 RX ORDER — OXYBUTYNIN CHLORIDE 5 MG/1
5 TABLET ORAL 2 TIMES DAILY
Qty: 60 TABLET | Refills: 0 | COMMUNITY
Start: 2018-07-10 | End: 2018-08-06 | Stop reason: SDUPTHER

## 2018-07-10 RX ORDER — PRAZOSIN HYDROCHLORIDE 2 MG/1
4 CAPSULE ORAL NIGHTLY
Qty: 60 CAPSULE | Refills: 0 | COMMUNITY
Start: 2018-07-10 | End: 2018-08-03 | Stop reason: SDUPTHER

## 2018-07-10 RX ORDER — ATORVASTATIN CALCIUM 10 MG/1
10 TABLET, FILM COATED ORAL EVERY MORNING
Qty: 30 TABLET | Refills: 0 | COMMUNITY
Start: 2018-07-10 | End: 2018-08-28 | Stop reason: SDUPTHER

## 2018-07-10 RX ORDER — QUETIAPINE FUMARATE 50 MG/1
50 TABLET, FILM COATED ORAL 3 TIMES DAILY
Qty: 90 TABLET | Refills: 0 | COMMUNITY
Start: 2018-07-10 | End: 2018-07-27

## 2018-07-10 RX ORDER — LOSARTAN POTASSIUM 50 MG/1
50 TABLET ORAL EVERY MORNING
Qty: 30 TABLET | Refills: 5 | COMMUNITY
Start: 2018-07-10 | End: 2018-08-28 | Stop reason: SDUPTHER

## 2018-07-10 RX ORDER — QUETIAPINE FUMARATE 100 MG/1
150 TABLET, FILM COATED ORAL 3 TIMES DAILY
Qty: 90 TABLET | Refills: 0 | COMMUNITY
Start: 2018-07-10 | End: 2018-08-03 | Stop reason: SDUPTHER

## 2018-07-10 ASSESSMENT — ENCOUNTER SYMPTOMS
WEAKNESS: 0
PALPITATIONS: 0
BLOOD IN STOOL: 0
DIFFICULTY URINATING: 0
HEADACHES: 0
SHORTNESS OF BREATH: 0
DIZZINESS: 0
FEVER: 0
FATIGUE: 0
ABDOMINAL DISTENTION: 0
DIARRHEA: 0
CONSTIPATION: 0
LIGHT-HEADEDNESS: 0
WHEEZING: 0
ABDOMINAL PAIN: 0
NAUSEA: 0
VOMITING: 0
ADENOPATHY: 0
COUGH: 0
TROUBLE SWALLOWING: 0
UNEXPECTED WEIGHT CHANGE: 0

## 2018-07-10 NOTE — ASSESSMENT & PLAN NOTE
Advised patient to monitor - if symptoms seem improved during the next visit will give trial of decreasing dose of oxybutynin

## 2018-07-10 NOTE — PROGRESS NOTES
Daily Progress Note       Patient ID: Kar Nicholas is a 53 y.o. female.    HPI    53 year old presents for follow up visit.     Hx of Schizophrenia, Bipolar - Manic, PTSD and Anxiety - will be following up at [place], [place] - awaiting appt with psychiatry. Did recently start group therapy about 2 weeks ago. Currently on abilify, seroquel and effexor XR - tolerating medications with no complaints. States doing well on current therapy.     Hypertension - last visit started on losartan 50 mg daily and discontinued off HCTZ 12.5 mg once daily and furosemide 20 mg daily - tolerating with no complaints. Does not check BP at [facility]         Bilateral Lower Extremity Swelling - had been on HCTZ and furosemide - previously was complaining of lower extremity swelling that had been present after a fall. States that the swelling had since resolved. Given trial of discontinuing the 2 diuretics - she states she is doing well today        Hypercholesterolemia - currently on atorvastatin 10 mg qhs - tolerating with no complaints.      GERD - currently on omeprazole 20mg daily - tolerating with no complaints. Recently had been off the medication because she had no refills and states the reflux symptoms got significantly worse. States doing much better since being back on the medication      Migraines - reports well controlled at this time - usually will take advil with onset and take imitrex if significant. Reports last migraine was about 1-2 months ago. Had previously been 2-3x/week. Usually located frontal or occipital areas - throbbing sensation. + Light and sound sensitivity and Nausea. Relieved by laying in dark quiet room. + Aura     Overactive Bladder - currently on oxybutynin 5mg twice daily with improvement in symptoms. Last visit discontinued the 2 diuretics to see if overactive bladder symptoms would improve - she is unsure today if she is urinating less     Nightmares - currently on prazosin with  improvement in nightmares.      Hx of alcohol and drug abuse. States has been alcohol free since October 31, 2016 and drug free since November 11, 2016. Was on cocaine        The following have been reviewed and updated as appropriate in this visit:  Allergies  Meds  Problems       Review of Systems   Constitutional: Negative for fatigue, fever and unexpected weight change.   HENT: Negative for trouble swallowing.    Eyes: Negative for visual disturbance.   Respiratory: Negative for cough, shortness of breath and wheezing.    Cardiovascular: Negative for chest pain, palpitations and leg swelling.   Gastrointestinal: Negative for abdominal distention, abdominal pain, blood in stool, constipation, diarrhea, nausea and vomiting.   Endocrine: Negative for cold intolerance and heat intolerance.   Genitourinary: Negative for difficulty urinating.   Skin: Negative for rash.   Neurological: Negative for dizziness, syncope, weakness, light-headedness and headaches.   Hematological: Negative for adenopathy.             Current Outpatient Prescriptions   Medication Sig Dispense Refill   • ARIPiprazole (ABILIFY) 20 mg tablet Take 1 tablet (20 mg total) by mouth daily. 30 tablet 0   • atorvastatin (LIPITOR) 10 mg tablet Take 1 tablet (10 mg total) by mouth daily. 30 tablet 0   • losartan (COZAAR) 50 mg tablet Take 1 tablet (50 mg total) by mouth daily. 30 tablet 5   • omeprazole (PriLOSEC) 20 mg capsule Take 1 capsule (20 mg total) by mouth daily before breakfast. 90 capsule 1   • oxybutynin (DITROPAN) 5 mg tablet Take 1 tablet (5 mg total) by mouth 2 (two) times a day. 60 tablet 0   • prazosin (MINIPRESS) 2 mg capsule Take 2 capsules (4 mg total) by mouth nightly. 60 capsule 0   • QUEtiapine (SEROquel) 100 mg tablet Take 1 tablet (100 mg total) by mouth 3 (three) times a day. 90 tablet 0   • QUEtiapine (SEROquel) 50 mg tablet Take 1 tablet (50 mg total) by mouth 3 (three) times a day. 90 tablet 0   • venlafaxine XR  "(EFFEXOR-XR) 150 mg 24 hr capsule Take 1 capsule (150 mg total) by mouth daily. 30 capsule 0     No current facility-administered medications for this visit.      Past Medical History:   Diagnosis Date   • Alcoholism (CMS/HCC) (HCC)    • Cocaine addiction (CMS/HCC) (HCC)    • Hypertension    • Lipid disorder    • Osteoarthritis      Family History   Problem Relation Age of Onset   • Colon cancer Mother    • Lung cancer Father    • Heart failure Brother    • Heart block Daughter      Past Surgical History:   Procedure Laterality Date   • KNEE SURGERY Right    • PARTIAL HYSTERECTOMY Bilateral 2010   • REDUCTION MAMMAPLASTY Bilateral      Social History     Social History   • Marital status: Single     Spouse name: N/A   • Number of children: N/A   • Years of education: N/A     Occupational History   • Not on file.     Social History Main Topics   • Smoking status: Current Every Day Smoker     Packs/day: 0.25   • Smokeless tobacco: Never Used   • Alcohol use No   • Drug use: No   • Sexual activity: Not on file     Other Topics Concern   • Not on file     Social History Narrative   • No narrative on file     Allergies   Allergen Reactions   • Shellfish Containing Products      Other reaction(s): shellfish       Objective   No new labs.    Vitals:    07/10/18 1227   BP: 112/78   BP Location: Left upper arm   Patient Position: Sitting   Pulse: 85   Temp: 36.9 °C (98.5 °F)   TempSrc: Oral   SpO2: 97%   Weight: (!) 140 kg (309 lb)   Height: 1.734 m (5' 8.25\")         Physical Exam   Constitutional: She is oriented to person, place, and time. She appears well-developed and well-nourished.   HENT:   Head: Normocephalic and atraumatic.   Eyes: Conjunctivae and EOM are normal. Pupils are equal, round, and reactive to light.   Neck: Normal range of motion. Neck supple.   Cardiovascular: Normal rate, regular rhythm and normal heart sounds.    Pulmonary/Chest: Effort normal and breath sounds normal. No respiratory distress. "   Abdominal: Soft. Bowel sounds are normal. She exhibits no distension. There is no tenderness.   Musculoskeletal: Normal range of motion. She exhibits no edema.   Neurological: She is alert and oriented to person, place, and time.   Skin: Skin is warm and dry.   Nursing note and vitals reviewed.      Assessment/Plan   Problem List Items Addressed This Visit     Gastroesophageal reflux disease     Reflux precautions given         Relevant Medications    omeprazole (PriLOSEC) 20 mg capsule    Hypercholesterolemia     Counseled patient about therapeutic lifestyle changes         Relevant Medications    atorvastatin (LIPITOR) 10 mg tablet    Essential hypertension - Primary     Low Na diet. Well controlled on Losartan         Relevant Medications    losartan (COZAAR) 50 mg tablet    prazosin (MINIPRESS) 2 mg capsule    Anxiety     Cont follow up with Group Therapy - awaiting appt with Psychiatry at [facility]         Relevant Medications    ARIPiprazole (ABILIFY) 20 mg tablet    QUEtiapine (SEROquel) 100 mg tablet    QUEtiapine (SEROquel) 50 mg tablet    venlafaxine XR (EFFEXOR-XR) 150 mg 24 hr capsule    Bipolar disorder, manic (CMS/HCC) (HCC)     Cont follow up with Group Therapy - awaiting appt with Psychiatry at [facility]         Relevant Medications    ARIPiprazole (ABILIFY) 20 mg tablet    QUEtiapine (SEROquel) 100 mg tablet    QUEtiapine (SEROquel) 50 mg tablet    venlafaxine XR (EFFEXOR-XR) 150 mg 24 hr capsule    PTSD (post-traumatic stress disorder)     Cont follow up with Group Therapy - awaiting appt with Psychiatry at [facility]         Relevant Medications    ARIPiprazole (ABILIFY) 20 mg tablet    QUEtiapine (SEROquel) 100 mg tablet    QUEtiapine (SEROquel) 50 mg tablet    venlafaxine XR (EFFEXOR-XR) 150 mg 24 hr capsule    Schizophrenia (CMS/HCC) (HCC)     Cont follow up with Group Therapy - awaiting appt with Psychiatry at [facility]         Relevant Medications    ARIPiprazole  (ABILIFY) 20 mg tablet    QUEtiapine (SEROquel) 100 mg tablet    QUEtiapine (SEROquel) 50 mg tablet    venlafaxine XR (EFFEXOR-XR) 150 mg 24 hr capsule    Nightmares    Relevant Medications    ARIPiprazole (ABILIFY) 20 mg tablet    QUEtiapine (SEROquel) 100 mg tablet    QUEtiapine (SEROquel) 50 mg tablet    venlafaxine XR (EFFEXOR-XR) 150 mg 24 hr capsule    prazosin (MINIPRESS) 2 mg capsule    Overactive bladder     Advised patient to monitor - if symptoms seem improved during the next visit will give trial of decreasing dose of oxybutynin         Relevant Medications    oxybutynin (DITROPAN) 5 mg tablet            Prosper Grullon MD  7/10/2018

## 2018-07-20 ENCOUNTER — TRANSCRIBE ORDERS (OUTPATIENT)
Dept: RADIOLOGY | Age: 53
End: 2018-07-20

## 2018-07-20 ENCOUNTER — HOSPITAL ENCOUNTER (OUTPATIENT)
Dept: RADIOLOGY | Age: 53
Discharge: HOME | End: 2018-07-20
Attending: ORTHOPAEDIC SURGERY
Payer: MEDICARE

## 2018-07-20 DIAGNOSIS — M25.561 PAIN IN RIGHT KNEE: ICD-10-CM

## 2018-07-20 DIAGNOSIS — M25.562 PAIN IN LEFT KNEE: ICD-10-CM

## 2018-07-20 DIAGNOSIS — M17.0 PRIMARY OSTEOARTHRITIS OF BOTH KNEES: Primary | ICD-10-CM

## 2018-07-20 PROCEDURE — 73564 X-RAY EXAM KNEE 4 OR MORE: CPT | Mod: 50

## 2018-07-23 ENCOUNTER — CONSULT (OUTPATIENT)
Dept: FAMILY MEDICINE | Facility: CLINIC | Age: 53
End: 2018-07-23
Payer: MEDICARE

## 2018-07-23 VITALS
DIASTOLIC BLOOD PRESSURE: 72 MMHG | HEART RATE: 88 BPM | SYSTOLIC BLOOD PRESSURE: 110 MMHG | HEIGHT: 68 IN | WEIGHT: 293 LBS | TEMPERATURE: 98.3 F | OXYGEN SATURATION: 97 % | BODY MASS INDEX: 44.41 KG/M2

## 2018-07-23 DIAGNOSIS — Z01.818 PRE-OPERATIVE EXAMINATION: Primary | ICD-10-CM

## 2018-07-23 PROCEDURE — 93000 ELECTROCARDIOGRAM COMPLETE: CPT | Performed by: FAMILY MEDICINE

## 2018-07-23 PROCEDURE — 99213 OFFICE O/P EST LOW 20 MIN: CPT | Performed by: FAMILY MEDICINE

## 2018-07-23 ASSESSMENT — ENCOUNTER SYMPTOMS
ABDOMINAL DISTENTION: 0
WHEEZING: 0
ADENOPATHY: 0
CONSTIPATION: 0
SHORTNESS OF BREATH: 0
DIARRHEA: 0
DIFFICULTY URINATING: 0
WEAKNESS: 0
TROUBLE SWALLOWING: 0
HEADACHES: 0
BLOOD IN STOOL: 0
LIGHT-HEADEDNESS: 0
NAUSEA: 0
VOMITING: 0
DIZZINESS: 0
ABDOMINAL PAIN: 0
FATIGUE: 0
PALPITATIONS: 0
COUGH: 0
UNEXPECTED WEIGHT CHANGE: 0
FEVER: 0

## 2018-07-23 NOTE — PATIENT INSTRUCTIONS
Patient Education     Preparing for Knee Replacement  Recovery from knee replacement surgery can be made easier and more comfortable by being prepared before surgery. This includes:  · Arranging for others to help you.  · Preparing your home.  · Preparing your body by getting a preoperative exam and being as healthy as you can.  · Doing exercises before your surgery as directed by your health care provider.  You can ease any concerns about your financial responsibilities by calling your insurance company after you decide to have surgery. In addition to asking about your surgery and hospital stay, you will want to ask about coverage for medical equipment, rehabilitation facilities, and home care.  How should I arrange for help?  You will be stronger and more mobile every day. However, in the first couple weeks after surgery, it is unlikely you will be able to do all your daily activities as easily as before your surgery. You may tire easily and will still have limited movement in your leg. Follow these guidelines to best arrange for the help you may need after your surgery:  · Plan to have someone take you home after the procedure. Your health care provider will be able to tell you how many days you can expect to be in the hospital.  · Cancel all work, caregiving, and volunteer responsibilities for at least 4-6 weeks after your surgery.  · If you live alone, arrange for someone to care for your home and pets for the first 4-6 weeks after surgery.  · Select someone with whom you feel comfortable to be with you day and night for the first week. This person will help you with your exercises and personal care, such as bathing and using the toilet.  · Arrange for drivers to bring you to and from your follow-up appointments, the grocery store, and other places you may need to go for at least 4-6 weeks.  How should I prepare my home?  · Pick a recovery spot, but do not plan on recovering in bed. Sitting upright is better  for your health. You may want to use a recliner with a small table nearby. Place the items you use most frequently on that table. These may include the TV remote, a cordless phone, a book or laptop computer, a water glass, and any other items of your choice.  · Remove all clutter from your floors. Also remove any throw rugs.  · To see if you will be able to move in your home with a wheeled walker, hold your hands out about 6 inches (15 cm) from your sides. Walk from your recovery spot to your kitchen and bathroom. Then walk from your bed to the bathroom. If you do not hit anything with your hands, you have enough room.  · Move the items you use most often in your kitchen, bathroom, and bedroom to shelves and drawers that are at countertop height.  · Prepare a few meals to freeze and reheat later.  · Consider adding grab bars in the shower and near the toilet.  · While you are in the hospital, you will learn about equipment that can be helpful for your recovery. Some of the equipment includes raised toilet seats, tub benches, and shower benches.  How should I prepare my body?  · Have a preoperative exam. This will ensure that your body is healthy enough to safely have this surgery. Bring a complete list of all your medicines and supplements, including herbs and vitamins. You may need to have additional tests to ensure your safety.  · Have elective dental care and routine cleanings completed before your surgery. Germs from anywhere in your body, including your mouth, can travel to your new joint and infect it. It is important not to have any dental work performed for at least 3 months after your surgery. After surgery, be sure to tell your dentist about your joint replacement.  · Maintain a healthy diet. Do not change your diet before surgery unless advised to do so by your health care provider.  · Do not use any tobacco products, including cigarettes, chewing tobacco, or electronic cigarettes. If you need help  quitting, ask your health care provider. Tobacco and nicotine products can delay healing after your surgery.  · The day before your surgery, follow your health care provider’s directions for showering, eating, drinking, and taking medicines. These directions are for your safety.  What kinds of exercises should I do?  Your health care provider may have you do the following exercises before your surgery. Be sure to follow the exercise program only as directed by your health care provider. While completing these exercises, remember to stretch for as long as you can, up to 30 seconds. You should only feel a gentle lengthening or release in the stretched tissue. You should not feel pain.  Ankle Pumps  1. While sitting on a firm surface with your legs straight out in front of you, move your feet at the ankle joints so that your toes are pulling back toward your chest.  2. Reverse the motion, pointing your toes away from you.  3. Repeat 10-20 times. Complete this exercise 1-2 times per day.  Heel Slides  1. Lie on your back with both knees straight. (If this causes back pain, bend one knee, placing your foot flat on the floor. Keep this leg in this position while doing heel slides with the opposite leg.)  2. Slowly slide one heel back toward your buttocks until you feel a gentle stretch in the front of your knee or thigh.  3. Slowly slide your heel back to the starting position.  4. Repeat 10-20 times, then switch heels and do it again. Complete this exercise 1-2 times per day.  Quadriceps Sets  1. Lie on your back with one leg extended and your opposite knee bent.  2. Gradually tighten the muscles in the front of the thigh of your extended leg. This motion will push the back of the knee down toward the floor.  3. Hold the muscle as tightly as you can without causing pain for 10 seconds.  4. Relax the muscles slowly and completely in between each repetition.  5. Repeat 10-20 times, then switch legs and do it again.  Complete this exercise 1-2 times per day.  Short Arc Kicks  1. Lie on your back. Place a rolled towel (4-6 inches [10-15 cm] high) under one knee so that the knee slightly bends.  2. Raise only the lower leg of your slightly bent leg by tightening the muscles in the front of your thigh. Do not allow your thigh to rise.  3. Hold this position for 5 seconds.  4. Repeat 10-20 times, then switch legs and do it again. Complete this exercise 1-2 times per day.  Straight Leg Raises  1. Lie on your back with one leg extended and your opposite knee bent.  2. Tighten the muscles in the front of the thigh of your extended leg. Your thigh may shake slightly.  3. Tighten these muscles even more and raise your leg 4-6 inches off the floor. Hold for 3-5 seconds.  4. Keeping these muscles tight, lower your leg.  5. Relax the muscles slowly and completely in between each repetition.  6. Repeat 10-20 times, then switch legs and do it again. Complete this exercise 1-2 times per day.  Arm Chair Push-ups  1. Find a firm, non-wheeled chair with solid armrests.  2. Sitting in the chair, extend one leg straight out in front of you.  3. Lift up your body weight, using your arms and opposite leg.  4. Slowly lower your body weight.  5. Repeat 10-20 times, then switch legs and do it again. Complete this exercise 1-2 times per day.  This information is not intended to replace advice given to you by your health care provider. Make sure you discuss any questions you have with your health care provider.  Document Released: 03/23/2012 Document Revised: 05/22/2017 Document Reviewed: 03/12/2015  [employer] Interactive Patient Education © 2017 [employer]

## 2018-07-23 NOTE — PROGRESS NOTES
Daily Progress Note       Patient ID: Kar Nicholas is a 53 y.o. female.    HPI    53 year old female presents for pre-operative clearance    Patient is planning to have right total knee replacement performed on 8/7/2018 by Dr Will Zamora at [facility].     Denies chest pain, shortness of breath, palpitations, syncopal or near syncopal episodes.     Able to do all her daily activities with limitations only due to bilateral knee pain (R > L). Able to walk multiple blocks but has limitations due to the knee pain.    Reports no complications with anesthesia in the past.     Alcohol free since 2016. Cocaine free since 19 months        The following have been reviewed and updated as appropriate in this visit:  Tobacco  Allergies  Meds  Problems  Med Hx  Surg Hx  Fam Hx       Review of Systems   Constitutional: Negative for fatigue, fever and unexpected weight change.   HENT: Negative for trouble swallowing.    Eyes: Negative for visual disturbance.   Respiratory: Negative for cough, shortness of breath and wheezing.    Cardiovascular: Negative for chest pain, palpitations and leg swelling.   Gastrointestinal: Negative for abdominal distention, abdominal pain, blood in stool, constipation, diarrhea, nausea and vomiting.   Endocrine: Negative for cold intolerance and heat intolerance.   Genitourinary: Negative for difficulty urinating.   Skin: Negative for rash.   Neurological: Negative for dizziness, syncope, weakness, light-headedness and headaches.   Hematological: Negative for adenopathy.             Current Outpatient Prescriptions   Medication Sig Dispense Refill   • ARIPiprazole (ABILIFY) 20 mg tablet Take 1 tablet (20 mg total) by mouth daily. 30 tablet 0   • atorvastatin (LIPITOR) 10 mg tablet Take 1 tablet (10 mg total) by mouth daily. 30 tablet 0   • losartan (COZAAR) 50 mg tablet Take 1 tablet (50 mg total) by mouth daily. 30 tablet 5   • omeprazole (PriLOSEC) 20 mg capsule Take 1 capsule  (20 mg total) by mouth daily before breakfast. 90 capsule 1   • oxybutynin (DITROPAN) 5 mg tablet Take 1 tablet (5 mg total) by mouth 2 (two) times a day. 60 tablet 0   • prazosin (MINIPRESS) 2 mg capsule Take 2 capsules (4 mg total) by mouth nightly. 60 capsule 0   • QUEtiapine (SEROquel) 100 mg tablet Take 1 tablet (100 mg total) by mouth 3 (three) times a day. 90 tablet 0   • QUEtiapine (SEROquel) 50 mg tablet Take 1 tablet (50 mg total) by mouth 3 (three) times a day. 90 tablet 0   • venlafaxine XR (EFFEXOR-XR) 150 mg 24 hr capsule Take 1 capsule (150 mg total) by mouth daily. 30 capsule 0     No current facility-administered medications for this visit.      Past Medical History:   Diagnosis Date   • Alcoholism (CMS/HCC) ([facility])    • Cocaine addiction (CMS/HCC) ([facility])    • Hypertension    • Lipid disorder    • Osteoarthritis      Family History   Problem Relation Age of Onset   • Colon cancer Mother    • Lung cancer Father    • Heart failure Brother    • Heart block Daughter      Past Surgical History:   Procedure Laterality Date   • KNEE SURGERY Right    • PARTIAL HYSTERECTOMY Bilateral 2010   • REDUCTION MAMMAPLASTY Bilateral      Social History     Social History   • Marital status: Single     Spouse name: N/A   • Number of children: N/A   • Years of education: N/A     Occupational History   • Not on file.     Social History Main Topics   • Smoking status: Current Every Day Smoker     Packs/day: 0.25   • Smokeless tobacco: Never Used   • Alcohol use No   • Drug use: No   • Sexual activity: Not on file     Other Topics Concern   • Not on file     Social History Narrative   • No narrative on file     Allergies   Allergen Reactions   • Shellfish Containing Products      Other reaction(s): shellfish       Objective   No new labs.    Vitals:    07/23/18 1505   BP: 110/72   BP Location: Left upper arm   Patient Position: Sitting   Pulse: 88   Temp: 36.8 °C (98.3 °F)   TempSrc: Oral   SpO2: 97%   Weight: (!) 138 kg  "(304 lb)   Height: 1.734 m (5' 8.25\")         Physical Exam   Constitutional: She is oriented to person, place, and time. She appears well-developed and well-nourished.   HENT:   Head: Normocephalic and atraumatic.   Eyes: Conjunctivae and EOM are normal. Pupils are equal, round, and reactive to light.   Neck: Normal range of motion. Neck supple.   Cardiovascular: Normal rate, regular rhythm and normal heart sounds.    Pulmonary/Chest: Effort normal and breath sounds normal. No respiratory distress.   Abdominal: Soft. Bowel sounds are normal. She exhibits no distension. There is no tenderness.   Musculoskeletal: Normal range of motion. She exhibits no edema.   Neurological: She is alert and oriented to person, place, and time.   Skin: Skin is warm and dry.   Nursing note and vitals reviewed.      Assessment/Plan   Problem List Items Addressed This Visit     None      Visit Diagnoses     Pre-operative examination    -  Primary    Relevant Orders    ECG 12 LEAD OFFICE PERFORMED      EKG done in the office - was negative. Pending review of pre-operative laboratory examinations done next week - barring any significant abnormalities - I find this patient to be medically stable for right total knee replacement to be performed on 8/7/2018 by Dr Will Zamora at [facility].       Prosper Grullon MD  7/23/2018  "

## 2018-08-03 ENCOUNTER — APPOINTMENT (OUTPATIENT)
Dept: LAB | Facility: HOSPITAL | Age: 53
End: 2018-08-03
Attending: ORTHOPAEDIC SURGERY
Payer: MEDICARE

## 2018-08-03 ENCOUNTER — TELEPHONE (OUTPATIENT)
Dept: FAMILY MEDICINE | Facility: CLINIC | Age: 53
End: 2018-08-03

## 2018-08-03 DIAGNOSIS — M17.0 PRIMARY OSTEOARTHRITIS OF BOTH KNEES: ICD-10-CM

## 2018-08-03 LAB
ABO + RH BLD: NORMAL
ALBUMIN SERPL-MCNC: 3.7 G/DL (ref 3.4–5)
ALP SERPL-CCNC: 68 IU/L (ref 35–126)
ALT SERPL-CCNC: 15 IU/L (ref 11–54)
ANION GAP SERPL CALC-SCNC: 8 MEQ/L (ref 3–15)
APTT PPP: 35 SEC. (ref 23–35)
AST SERPL-CCNC: 16 IU/L (ref 15–41)
BILIRUB SERPL-MCNC: 0.4 MG/DL (ref 0.3–1.2)
BLD GP AB SCN SERPL QL: NEGATIVE
BLOOD BANK CMNT PATIENT-IMP: NORMAL
BUN SERPL-MCNC: 6 MG/DL (ref 8–20)
CALCIUM SERPL-MCNC: 9.2 MG/DL (ref 8.9–10.3)
CHLORIDE SERPL-SCNC: 106 MMOL/L (ref 98–109)
CO2 SERPL-SCNC: 26 MMOL/L (ref 22–32)
CREAT SERPL-MCNC: 1 MG/DL (ref 0.6–1.1)
D AG BLD QL: POSITIVE
ERYTHROCYTE [DISTWIDTH] IN BLOOD BY AUTOMATED COUNT: 13.8 % (ref 11.7–14.4)
EST. AVERAGE GLUCOSE BLD GHB EST-MCNC: 123 MG/DL
GFR SERPL CREATININE-BSD FRML MDRD: >60 ML/MIN/1.73M*2
GLUCOSE SERPL-MCNC: 86 MG/DL (ref 70–99)
HBA1C MFR BLD HPLC: 5.9 %
HCT VFR BLDCO AUTO: 39.5 % (ref 35–45)
HGB BLD-MCNC: 12.6 G/DL (ref 11.8–15.7)
INR PPP: 0.9 INR
LABORATORY COMMENT REPORT: NORMAL
MCH RBC QN AUTO: 28.1 PG (ref 28–33.2)
MCHC RBC AUTO-ENTMCNC: 31.9 G/DL (ref 32.2–35.5)
MCV RBC AUTO: 88.2 FL (ref 83–98)
PDW BLD AUTO: 12.2 FL (ref 9.4–12.3)
PLATELET # BLD AUTO: 228 K/UL (ref 150–369)
POTASSIUM SERPL-SCNC: 4.2 MMOL/L (ref 3.6–5.1)
PROT SERPL-MCNC: 6.4 G/DL (ref 6–8.2)
PROTHROMBIN TIME: 11.7 SEC. (ref 12.2–14.5)
RBC # BLD AUTO: 4.48 M/UL (ref 3.93–5.22)
SODIUM SERPL-SCNC: 140 MMOL/L (ref 136–144)
WBC # BLD AUTO: 4.95 K/UL (ref 3.8–10.5)

## 2018-08-03 PROCEDURE — 83036 HEMOGLOBIN GLYCOSYLATED A1C: CPT | Mod: GA

## 2018-08-03 PROCEDURE — 85730 THROMBOPLASTIN TIME PARTIAL: CPT | Mod: GA

## 2018-08-03 PROCEDURE — 80053 COMPREHEN METABOLIC PANEL: CPT

## 2018-08-03 PROCEDURE — 86850 RBC ANTIBODY SCREEN: CPT

## 2018-08-03 PROCEDURE — 85610 PROTHROMBIN TIME: CPT | Mod: GA

## 2018-08-03 PROCEDURE — 36415 COLL VENOUS BLD VENIPUNCTURE: CPT | Mod: GA

## 2018-08-03 PROCEDURE — 85027 COMPLETE CBC AUTOMATED: CPT | Mod: GA

## 2018-08-03 NOTE — TELEPHONE ENCOUNTER
Pt calling to check on request for med refills.  She needs all meds that were requested electronically on 8/1 to be refilled asap to [facility] in [place].  She is scheduled to see psych at [facility] in [place] on 8/28 so needs enough to get through until then.  She said KP told her he would fill until then.  Any questions call her at [phone].

## 2018-08-06 ENCOUNTER — ANESTHESIA EVENT (OUTPATIENT)
Dept: OPERATING ROOM | Facility: HOSPITAL | Age: 53
Setting detail: SURGERY ADMIT
DRG: 470 | End: 2018-08-06
Payer: MEDICARE

## 2018-08-06 ENCOUNTER — TELEPHONE (OUTPATIENT)
Dept: FAMILY MEDICINE | Facility: CLINIC | Age: 53
End: 2018-08-06

## 2018-08-06 NOTE — TELEPHONE ENCOUNTER
L[place]na from [facility] pharmacy l/m @ 11:27 checking on refill for oxybutynin 5 mg BID, they requested refills last week and stated they received refills on all that were requested except this one.  Pt gets compliance medication distributed in packaged daily doses and need to be sent out for patient today. It appears other meds were sent on 8/3/18.    [phone], pharmacy

## 2018-08-07 ENCOUNTER — ANESTHESIA (OUTPATIENT)
Dept: OPERATING ROOM | Facility: HOSPITAL | Age: 53
Setting detail: SURGERY ADMIT
DRG: 470 | End: 2018-08-07
Payer: MEDICARE

## 2018-08-07 ENCOUNTER — HOSPITAL ENCOUNTER (INPATIENT)
Facility: HOSPITAL | Age: 53
LOS: 2 days | Discharge: HOME | DRG: 470 | End: 2018-08-09
Attending: ORTHOPAEDIC SURGERY | Admitting: ORTHOPAEDIC SURGERY
Payer: MEDICARE

## 2018-08-07 ENCOUNTER — APPOINTMENT (INPATIENT)
Dept: OCCUPATIONAL THERAPY | Facility: HOSPITAL | Age: 53
DRG: 470 | End: 2018-08-07
Attending: ORTHOPAEDIC SURGERY
Payer: MEDICARE

## 2018-08-07 ENCOUNTER — APPOINTMENT (INPATIENT)
Dept: RADIOLOGY | Facility: HOSPITAL | Age: 53
DRG: 470 | End: 2018-08-07
Attending: ORTHOPAEDIC SURGERY
Payer: MEDICARE

## 2018-08-07 DIAGNOSIS — F20.9 SCHIZOPHRENIA, UNSPECIFIED TYPE: ICD-10-CM

## 2018-08-07 DIAGNOSIS — F31.10: ICD-10-CM

## 2018-08-07 DIAGNOSIS — F43.10 PTSD (POST-TRAUMATIC STRESS DISORDER): ICD-10-CM

## 2018-08-07 DIAGNOSIS — F41.9 ANXIETY: ICD-10-CM

## 2018-08-07 PROBLEM — M17.10 ARTHRITIS OF KNEE: Status: ACTIVE | Noted: 2018-08-07

## 2018-08-07 PROBLEM — G47.33 OBSTRUCTIVE SLEEP APNEA: Status: ACTIVE | Noted: 2018-08-07

## 2018-08-07 PROCEDURE — 63600000 HC DRUGS/DETAIL CODE: Performed by: NURSE PRACTITIONER

## 2018-08-07 PROCEDURE — 25800000 HC PHARMACY IV SOLUTIONS: Performed by: ORTHOPAEDIC SURGERY

## 2018-08-07 PROCEDURE — 97162 PT EVAL MOD COMPLEX 30 MIN: CPT | Mod: GP

## 2018-08-07 PROCEDURE — C1776 JOINT DEVICE (IMPLANTABLE): HCPCS | Performed by: ORTHOPAEDIC SURGERY

## 2018-08-07 PROCEDURE — 20600000 HC ROOM AND CARE INTERMEDIATE/TELEMETRY

## 2018-08-07 PROCEDURE — 37000010 ANESTHESIA SPINAL BLOCK: Performed by: SPECIALIST

## 2018-08-07 PROCEDURE — 36000016 HC OR LEVEL 6 EA ADDL MIN: Performed by: ORTHOPAEDIC SURGERY

## 2018-08-07 PROCEDURE — 71000001 HC PACU PHASE 1 INITIAL 30MIN: Performed by: ORTHOPAEDIC SURGERY

## 2018-08-07 PROCEDURE — 63600000 HC DRUGS/DETAIL CODE: Performed by: NURSE ANESTHETIST, CERTIFIED REGISTERED

## 2018-08-07 PROCEDURE — 63700000 HC SELF-ADMINISTRABLE DRUG: Performed by: INTERNAL MEDICINE

## 2018-08-07 PROCEDURE — 27200000 HC STERILE SUPPLY: Performed by: ORTHOPAEDIC SURGERY

## 2018-08-07 PROCEDURE — 12000000 HC ROOM AND CARE MED/SURG

## 2018-08-07 PROCEDURE — 25000000 HC PHARMACY GENERAL: Performed by: NURSE ANESTHETIST, CERTIFIED REGISTERED

## 2018-08-07 PROCEDURE — 63600000 HC DRUGS/DETAIL CODE: Performed by: ORTHOPAEDIC SURGERY

## 2018-08-07 PROCEDURE — 71000011 HC PACU PHASE 1 EA ADDL MIN: Performed by: ORTHOPAEDIC SURGERY

## 2018-08-07 PROCEDURE — 36000006 HC OR LEVEL 6 INITIAL 30MIN: Performed by: ORTHOPAEDIC SURGERY

## 2018-08-07 PROCEDURE — 37000010 HC ANESTHESIA SPINAL: Performed by: ORTHOPAEDIC SURGERY

## 2018-08-07 PROCEDURE — 94660 CPAP INITIATION&MGMT: CPT

## 2018-08-07 PROCEDURE — 25800000 HC PHARMACY IV SOLUTIONS: Performed by: SPECIALIST

## 2018-08-07 PROCEDURE — 97166 OT EVAL MOD COMPLEX 45 MIN: CPT | Mod: GO

## 2018-08-07 PROCEDURE — 63600000 HC DRUGS/DETAIL CODE: Mod: JW | Performed by: NURSE ANESTHETIST, CERTIFIED REGISTERED

## 2018-08-07 PROCEDURE — 73560 X-RAY EXAM OF KNEE 1 OR 2: CPT | Mod: RT

## 2018-08-07 PROCEDURE — 25000000 HC PHARMACY GENERAL: Performed by: ORTHOPAEDIC SURGERY

## 2018-08-07 PROCEDURE — 0SRC0J9 REPLACEMENT OF RIGHT KNEE JOINT WITH SYNTHETIC SUBSTITUTE, CEMENTED, OPEN APPROACH: ICD-10-PCS | Performed by: ORTHOPAEDIC SURGERY

## 2018-08-07 PROCEDURE — 63700000 HC SELF-ADMINISTRABLE DRUG: Performed by: ORTHOPAEDIC SURGERY

## 2018-08-07 PROCEDURE — 25000000 HC PHARMACY GENERAL: Performed by: SPECIALIST

## 2018-08-07 PROCEDURE — C1713 ANCHOR/SCREW BN/BN,TIS/BN: HCPCS | Performed by: ORTHOPAEDIC SURGERY

## 2018-08-07 DEVICE — RESURFACING ROUND PATELLAR COMPONENT JOURNEY: Type: IMPLANTABLE DEVICE | Site: KNEE | Status: FUNCTIONAL

## 2018-08-07 DEVICE — CEMENT BONE SMART SET MV 40G: Type: IMPLANTABLE DEVICE | Site: KNEE | Status: FUNCTIONAL

## 2018-08-07 DEVICE — KNEE-JOURNEY II BCS COCR FEM AND NP TIB: Type: IMPLANTABLE DEVICE | Site: KNEE | Status: FUNCTIONAL

## 2018-08-07 DEVICE — IMPLANTABLE DEVICE: Type: IMPLANTABLE DEVICE | Site: KNEE | Status: FUNCTIONAL

## 2018-08-07 RX ORDER — ONDANSETRON 8 MG/1
8 TABLET, ORALLY DISINTEGRATING ORAL ONCE
Status: COMPLETED | OUTPATIENT
Start: 2018-08-07 | End: 2018-08-07

## 2018-08-07 RX ORDER — SODIUM CHLORIDE 9 MG/ML
INJECTION, SOLUTION INTRAVENOUS CONTINUOUS
Status: ACTIVE | OUTPATIENT
Start: 2018-08-07 | End: 2018-08-08

## 2018-08-07 RX ORDER — DEXTROSE 40 %
15-30 GEL (GRAM) ORAL AS NEEDED
Status: DISCONTINUED | OUTPATIENT
Start: 2018-08-07 | End: 2018-08-09 | Stop reason: HOSPADM

## 2018-08-07 RX ORDER — CELECOXIB 200 MG/1
400 CAPSULE ORAL ONCE
Status: COMPLETED | OUTPATIENT
Start: 2018-08-07 | End: 2018-08-07

## 2018-08-07 RX ORDER — LOSARTAN POTASSIUM 50 MG/1
50 TABLET ORAL EVERY MORNING
Status: DISCONTINUED | OUTPATIENT
Start: 2018-08-08 | End: 2018-08-09 | Stop reason: HOSPADM

## 2018-08-07 RX ORDER — FAMOTIDINE 10 MG/ML
20 INJECTION INTRAVENOUS EVERY 12 HOURS
Status: DISCONTINUED | OUTPATIENT
Start: 2018-08-07 | End: 2018-08-07

## 2018-08-07 RX ORDER — OXYCODONE HYDROCHLORIDE 5 MG/1
5-10 TABLET ORAL EVERY 4 HOURS PRN
Status: DISCONTINUED | OUTPATIENT
Start: 2018-08-07 | End: 2018-08-08

## 2018-08-07 RX ORDER — CEFAZOLIN SODIUM/WATER 2 G/20 ML
2 SYRINGE (ML) INTRAVENOUS ONCE
Status: COMPLETED | OUTPATIENT
Start: 2018-08-07 | End: 2018-08-07

## 2018-08-07 RX ORDER — SODIUM CHLORIDE 9 MG/ML
20 INJECTION, SOLUTION INTRAVENOUS CONTINUOUS
Status: ACTIVE | OUTPATIENT
Start: 2018-08-07 | End: 2018-08-08

## 2018-08-07 RX ORDER — DEXTROSE 50 % IN WATER (D50W) INTRAVENOUS SYRINGE
25 AS NEEDED
Status: DISCONTINUED | OUTPATIENT
Start: 2018-08-07 | End: 2018-08-09 | Stop reason: HOSPADM

## 2018-08-07 RX ORDER — ONDANSETRON HYDROCHLORIDE 2 MG/ML
4 INJECTION, SOLUTION INTRAVENOUS EVERY 6 HOURS PRN
Status: DISCONTINUED | OUTPATIENT
Start: 2018-11-05 | End: 2018-08-07 | Stop reason: HOSPADM

## 2018-08-07 RX ORDER — BUPIVACAINE HYDROCHLORIDE 7.5 MG/ML
INJECTION, SOLUTION INTRASPINAL AS NEEDED
Status: DISCONTINUED | OUTPATIENT
Start: 2018-08-07 | End: 2018-08-07 | Stop reason: SURG

## 2018-08-07 RX ORDER — EPHEDRINE SULFATE 50 MG/ML
INJECTION, SOLUTION INTRAVENOUS AS NEEDED
Status: DISCONTINUED | OUTPATIENT
Start: 2018-08-07 | End: 2018-08-07 | Stop reason: SURG

## 2018-08-07 RX ORDER — NALOXONE HYDROCHLORIDE 0.4 MG/ML
0.4 INJECTION, SOLUTION INTRAMUSCULAR; INTRAVENOUS; SUBCUTANEOUS AS NEEDED
Status: DISCONTINUED | OUTPATIENT
Start: 2018-08-07 | End: 2018-08-07 | Stop reason: HOSPADM

## 2018-08-07 RX ORDER — DIPHENHYDRAMINE HYDROCHLORIDE 50 MG/ML
12.5 INJECTION INTRAMUSCULAR; INTRAVENOUS EVERY 6 HOURS PRN
Status: DISCONTINUED | OUTPATIENT
Start: 2018-08-07 | End: 2018-08-07 | Stop reason: HOSPADM

## 2018-08-07 RX ORDER — ONDANSETRON HYDROCHLORIDE 2 MG/ML
4 INJECTION, SOLUTION INTRAVENOUS EVERY 8 HOURS PRN
Status: DISCONTINUED | OUTPATIENT
Start: 2018-08-07 | End: 2018-08-09 | Stop reason: HOSPADM

## 2018-08-07 RX ORDER — NAPROXEN SODIUM 220 MG/1
81 TABLET, FILM COATED ORAL 2 TIMES DAILY
Status: DISCONTINUED | OUTPATIENT
Start: 2018-08-07 | End: 2018-08-09 | Stop reason: HOSPADM

## 2018-08-07 RX ORDER — POLYETHYLENE GLYCOL 3350 17 G/17G
17 POWDER, FOR SOLUTION ORAL DAILY
Status: DISCONTINUED | OUTPATIENT
Start: 2018-08-07 | End: 2018-08-09 | Stop reason: HOSPADM

## 2018-08-07 RX ORDER — PHENYLEPHRINE HYDROCHLORIDE 10 MG/ML
INJECTION INTRAVENOUS AS NEEDED
Status: DISCONTINUED | OUTPATIENT
Start: 2018-08-07 | End: 2018-08-07 | Stop reason: SURG

## 2018-08-07 RX ORDER — QUETIAPINE FUMARATE 25 MG/1
50 TABLET, FILM COATED ORAL 3 TIMES DAILY
Status: DISCONTINUED | OUTPATIENT
Start: 2018-08-07 | End: 2018-08-09 | Stop reason: HOSPADM

## 2018-08-07 RX ORDER — LIDOCAINE HYDROCHLORIDE 10 MG/ML
INJECTION, SOLUTION INFILTRATION; PERINEURAL AS NEEDED
Status: DISCONTINUED | OUTPATIENT
Start: 2018-08-07 | End: 2018-08-07 | Stop reason: SURG

## 2018-08-07 RX ORDER — MIDAZOLAM HYDROCHLORIDE 2 MG/2ML
INJECTION, SOLUTION INTRAMUSCULAR; INTRAVENOUS AS NEEDED
Status: DISCONTINUED | OUTPATIENT
Start: 2018-08-07 | End: 2018-08-07 | Stop reason: SURG

## 2018-08-07 RX ORDER — CELECOXIB 200 MG/1
400 CAPSULE ORAL DAILY
Status: DISCONTINUED | OUTPATIENT
Start: 2018-08-08 | End: 2018-08-07

## 2018-08-07 RX ORDER — DEXAMETHASONE SODIUM PHOSPHATE 4 MG/ML
10 INJECTION, SOLUTION INTRA-ARTICULAR; INTRALESIONAL; INTRAMUSCULAR; INTRAVENOUS; SOFT TISSUE DAILY
Status: DISCONTINUED | OUTPATIENT
Start: 2018-08-07 | End: 2018-08-09

## 2018-08-07 RX ORDER — ACETAMINOPHEN 325 MG/1
650 TABLET ORAL 3 TIMES DAILY
Status: DISCONTINUED | OUTPATIENT
Start: 2018-08-07 | End: 2018-08-09 | Stop reason: HOSPADM

## 2018-08-07 RX ORDER — FAMOTIDINE 20 MG/1
20 TABLET, FILM COATED ORAL ONCE
Status: COMPLETED | OUTPATIENT
Start: 2018-08-07 | End: 2018-08-07

## 2018-08-07 RX ORDER — PREGABALIN 150 MG/1
150 CAPSULE ORAL ONCE
Status: COMPLETED | OUTPATIENT
Start: 2018-08-07 | End: 2018-08-07

## 2018-08-07 RX ORDER — NALOXONE HYDROCHLORIDE 0.4 MG/ML
0.1 INJECTION, SOLUTION INTRAMUSCULAR; INTRAVENOUS; SUBCUTANEOUS
Status: DISCONTINUED | OUTPATIENT
Start: 2018-08-07 | End: 2018-08-07 | Stop reason: HOSPADM

## 2018-08-07 RX ORDER — OXYCODONE HCL 20 MG/1
20 TABLET, FILM COATED, EXTENDED RELEASE ORAL ONCE
Status: COMPLETED | OUTPATIENT
Start: 2018-08-07 | End: 2018-08-07

## 2018-08-07 RX ORDER — TRAMADOL HYDROCHLORIDE 50 MG/1
50 TABLET ORAL 4 TIMES DAILY
Status: DISCONTINUED | OUTPATIENT
Start: 2018-08-07 | End: 2018-08-07

## 2018-08-07 RX ORDER — PANTOPRAZOLE SODIUM 40 MG/1
40 TABLET, DELAYED RELEASE ORAL DAILY
Status: DISCONTINUED | OUTPATIENT
Start: 2018-08-07 | End: 2018-08-09 | Stop reason: HOSPADM

## 2018-08-07 RX ORDER — ATORVASTATIN CALCIUM 10 MG/1
10 TABLET, FILM COATED ORAL EVERY MORNING
Status: DISCONTINUED | OUTPATIENT
Start: 2018-08-08 | End: 2018-08-09 | Stop reason: HOSPADM

## 2018-08-07 RX ORDER — PROPOFOL 10 MG/ML
INJECTION, EMULSION INTRAVENOUS CONTINUOUS PRN
Status: DISCONTINUED | OUTPATIENT
Start: 2018-08-07 | End: 2018-08-07 | Stop reason: SURG

## 2018-08-07 RX ORDER — IBUPROFEN 200 MG
16-32 TABLET ORAL AS NEEDED
Status: DISCONTINUED | OUTPATIENT
Start: 2018-08-07 | End: 2018-08-09 | Stop reason: HOSPADM

## 2018-08-07 RX ORDER — HYDROMORPHONE HYDROCHLORIDE 1 MG/ML
.25-.5 INJECTION, SOLUTION INTRAMUSCULAR; INTRAVENOUS; SUBCUTANEOUS
Status: DISCONTINUED | OUTPATIENT
Start: 2018-08-07 | End: 2018-08-08

## 2018-08-07 RX ORDER — ALUMINUM HYDROXIDE, MAGNESIUM HYDROXIDE, AND SIMETHICONE 1200; 120; 1200 MG/30ML; MG/30ML; MG/30ML
30 SUSPENSION ORAL EVERY 4 HOURS PRN
Status: DISCONTINUED | OUTPATIENT
Start: 2018-08-07 | End: 2018-08-09 | Stop reason: HOSPADM

## 2018-08-07 RX ORDER — PRAZOSIN HYDROCHLORIDE 1 MG/1
4 CAPSULE ORAL NIGHTLY
Status: DISCONTINUED | OUTPATIENT
Start: 2018-08-07 | End: 2018-08-09 | Stop reason: HOSPADM

## 2018-08-07 RX ORDER — AMOXICILLIN 250 MG
1 CAPSULE ORAL 2 TIMES DAILY
Status: DISCONTINUED | OUTPATIENT
Start: 2018-08-07 | End: 2018-08-09 | Stop reason: HOSPADM

## 2018-08-07 RX ORDER — KETOROLAC TROMETHAMINE 15 MG/ML
15 INJECTION, SOLUTION INTRAMUSCULAR; INTRAVENOUS EVERY 6 HOURS
Status: DISCONTINUED | OUTPATIENT
Start: 2018-08-07 | End: 2018-08-09

## 2018-08-07 RX ORDER — VENLAFAXINE HYDROCHLORIDE 37.5 MG/1
150 CAPSULE, EXTENDED RELEASE ORAL
Status: DISCONTINUED | OUTPATIENT
Start: 2018-08-08 | End: 2018-08-08

## 2018-08-07 RX ORDER — TRAMADOL HYDROCHLORIDE 50 MG/1
50 TABLET ORAL EVERY 6 HOURS PRN
Status: DISCONTINUED | OUTPATIENT
Start: 2018-08-07 | End: 2018-08-09 | Stop reason: HOSPADM

## 2018-08-07 RX ORDER — PROCHLORPERAZINE EDISYLATE 5 MG/ML
10 INJECTION INTRAMUSCULAR; INTRAVENOUS EVERY 6 HOURS PRN
Status: DISCONTINUED | OUTPATIENT
Start: 2018-08-07 | End: 2018-08-07 | Stop reason: HOSPADM

## 2018-08-07 RX ADMIN — DEXAMETHASONE SODIUM PHOSPHATE 10 MG: 4 INJECTION, SOLUTION INTRAMUSCULAR; INTRAVENOUS at 12:52

## 2018-08-07 RX ADMIN — PHENYLEPHRINE HYDROCHLORIDE 50 MCG/MIN: 10 INJECTION INTRAVENOUS at 10:24

## 2018-08-07 RX ADMIN — MIDAZOLAM HYDROCHLORIDE 0.5 MG: 1 INJECTION, SOLUTION INTRAMUSCULAR; INTRAVENOUS at 09:52

## 2018-08-07 RX ADMIN — BUPIVACAINE HYDROCHLORIDE IN DEXTROSE 2 ML: 7.5 INJECTION, SOLUTION SUBARACHNOID at 09:50

## 2018-08-07 RX ADMIN — TRAMADOL HYDROCHLORIDE 50 MG: 50 TABLET, COATED ORAL at 17:48

## 2018-08-07 RX ADMIN — SODIUM CHLORIDE 20 ML/HR: 9 INJECTION, SOLUTION INTRAVENOUS at 08:14

## 2018-08-07 RX ADMIN — ACETAMINOPHEN 650 MG: 325 TABLET, FILM COATED ORAL at 22:00

## 2018-08-07 RX ADMIN — EPHEDRINE SULFATE 10 MG: 50 INJECTION INTRAVENOUS at 10:21

## 2018-08-07 RX ADMIN — PHENYLEPHRINE HYDROCHLORIDE 200 MCG: 10 INJECTION INTRAVENOUS at 10:19

## 2018-08-07 RX ADMIN — PHENYLEPHRINE HYDROCHLORIDE 200 MCG: 10 INJECTION INTRAVENOUS at 10:16

## 2018-08-07 RX ADMIN — KETOROLAC TROMETHAMINE 15 MG: 15 INJECTION, SOLUTION INTRAMUSCULAR; INTRAVENOUS at 23:48

## 2018-08-07 RX ADMIN — PREGABALIN 150 MG: 150 CAPSULE ORAL at 08:13

## 2018-08-07 RX ADMIN — SODIUM CHLORIDE: 9 INJECTION, SOLUTION INTRAVENOUS at 16:48

## 2018-08-07 RX ADMIN — EPHEDRINE SULFATE 5 MG: 50 INJECTION INTRAVENOUS at 10:55

## 2018-08-07 RX ADMIN — FAMOTIDINE 20 MG: 20 TABLET ORAL at 08:13

## 2018-08-07 RX ADMIN — Medication 2 G: at 09:58

## 2018-08-07 RX ADMIN — CELECOXIB 400 MG: 200 CAPSULE ORAL at 08:13

## 2018-08-07 RX ADMIN — PHENYLEPHRINE HYDROCHLORIDE 100 MCG: 10 INJECTION INTRAVENOUS at 10:23

## 2018-08-07 RX ADMIN — PROPOFOL 25 MCG/KG/MIN: 10 INJECTION, EMULSION INTRAVENOUS at 09:59

## 2018-08-07 RX ADMIN — EPHEDRINE SULFATE 5 MG: 50 INJECTION INTRAVENOUS at 11:00

## 2018-08-07 RX ADMIN — PHENYLEPHRINE HYDROCHLORIDE 100 MCG: 10 INJECTION INTRAVENOUS at 10:11

## 2018-08-07 RX ADMIN — VANCOMYCIN HYDROCHLORIDE 2000 MG: 1 INJECTION, POWDER, LYOPHILIZED, FOR SOLUTION INTRAVENOUS at 22:00

## 2018-08-07 RX ADMIN — Medication 2 G: at 17:57

## 2018-08-07 RX ADMIN — OXYCODONE HYDROCHLORIDE 20 MG: 20 TABLET, FILM COATED, EXTENDED RELEASE ORAL at 08:13

## 2018-08-07 RX ADMIN — PHENYLEPHRINE HYDROCHLORIDE 100 MCG: 10 INJECTION INTRAVENOUS at 10:04

## 2018-08-07 RX ADMIN — PANTOPRAZOLE SODIUM 40 MG: 40 TABLET, DELAYED RELEASE ORAL at 22:00

## 2018-08-07 RX ADMIN — PRAZOSIN HYDROCHLORIDE 4 MG: 1 CAPSULE ORAL at 22:00

## 2018-08-07 RX ADMIN — PHENYLEPHRINE HYDROCHLORIDE 200 MCG: 10 INJECTION INTRAVENOUS at 10:13

## 2018-08-07 RX ADMIN — SENNOSIDES AND DOCUSATE SODIUM 1 TABLET: 8.6; 5 TABLET ORAL at 22:00

## 2018-08-07 RX ADMIN — ONDANSETRON 8 MG: 8 TABLET, ORALLY DISINTEGRATING ORAL at 08:13

## 2018-08-07 RX ADMIN — ASPIRIN 81 MG 81 MG: 81 TABLET ORAL at 22:00

## 2018-08-07 RX ADMIN — MIDAZOLAM HYDROCHLORIDE 1 MG: 1 INJECTION, SOLUTION INTRAMUSCULAR; INTRAVENOUS at 09:39

## 2018-08-07 RX ADMIN — VANCOMYCIN HYDROCHLORIDE 1500 MG: 1 INJECTION, POWDER, LYOPHILIZED, FOR SOLUTION INTRAVENOUS at 08:42

## 2018-08-07 RX ADMIN — MIDAZOLAM HYDROCHLORIDE 0.5 MG: 1 INJECTION, SOLUTION INTRAMUSCULAR; INTRAVENOUS at 09:55

## 2018-08-07 RX ADMIN — ACETAMINOPHEN 650 MG: 325 TABLET, FILM COATED ORAL at 17:48

## 2018-08-07 RX ADMIN — POLYETHYLENE GLYCOL 3350 17 G: 17 POWDER, FOR SOLUTION ORAL at 17:50

## 2018-08-07 RX ADMIN — EPHEDRINE SULFATE 10 MG: 50 INJECTION INTRAVENOUS at 10:38

## 2018-08-07 RX ADMIN — LIDOCAINE HYDROCHLORIDE 3 ML: 10 INJECTION, SOLUTION INFILTRATION; PERINEURAL at 09:59

## 2018-08-07 RX ADMIN — KETOROLAC TROMETHAMINE 15 MG: 15 INJECTION, SOLUTION INTRAMUSCULAR; INTRAVENOUS at 16:49

## 2018-08-07 RX ADMIN — EPHEDRINE SULFATE 10 MG: 50 INJECTION INTRAVENOUS at 11:28

## 2018-08-07 RX ADMIN — TRANEXAMIC ACID 2400 MG: 100 INJECTION, SOLUTION INTRAVENOUS at 09:33

## 2018-08-07 ASSESSMENT — COGNITIVE AND FUNCTIONAL STATUS - GENERAL
EATING MEALS: 4 - NONE
MOVING TO AND FROM BED TO CHAIR: 3 - A LITTLE
DRESSING REGULAR LOWER BODY CLOTHING: 2 - A LOT
TOILETING: 2 - A LOT
STANDING UP FROM CHAIR USING ARMS: 3 - A LITTLE
DRESSING REGULAR UPPER BODY CLOTHING: 3 - A LITTLE
WALKING IN HOSPITAL ROOM: 3 - A LITTLE
CLIMB 3 TO 5 STEPS WITH RAILING: 3 - A LITTLE
HELP NEEDED FOR BATHING: 2 - A LOT
HELP NEEDED FOR PERSONAL GROOMING: 3 - A LITTLE

## 2018-08-07 ASSESSMENT — PAIN - FUNCTIONAL ASSESSMENT
PAIN_FUNCTIONAL_ASSESSMENT: NO/DENIES PAIN
PAIN_FUNCTIONAL_ASSESSMENT: WONG-BAKER FACES
PAIN_FUNCTIONAL_ASSESSMENT: NO/DENIES PAIN
PAIN_FUNCTIONAL_ASSESSMENT: NO/DENIES PAIN
PAIN_FUNCTIONAL_ASSESSMENT: 0-10
PAIN_FUNCTIONAL_ASSESSMENT: NO/DENIES PAIN

## 2018-08-07 ASSESSMENT — PAIN SCALES - WONG BAKER
WONGBAKER_NUMERICALRESPONSE: NO HURT

## 2018-08-07 ASSESSMENT — ENCOUNTER SYMPTOMS: HEADACHES: 1

## 2018-08-07 NOTE — PROGRESS NOTES
Patient: Kar Nicholas  Location: [facility] 4A 4003  MRN: [id number]  Today's date: 8/7/2018    Pt left in recliner chair, alarmed, callbell in reach.        Therapy Pain/Vitals     Row Name 08/07/18 1821 08/07/18 1846       Vital Signs    Pulse 74 72    SpO2 93 % 95 %    Patient Activity At rest  --    Oxygen Therapy Supplemental oxygen  --    O2 Flow Rate (L/min) 3 L/min 3 L/min    BP (!)  141/83 (!)  143/82    BP Location Left upper arm Left upper arm    BP Method Automatic Automatic    Patient Position Lying Sitting   post SPT w/RW          Prior Living Environment  Lives With: child(vj), adult  Living Arrangements: house  Living Environment Comment: 3SH, 1st flr MT/laundry room, main living area, 2nd flr B/B-tub shower, 3rd flr pt's bedroom, 5STE w/L ascending rail on 3 steps only.  FF stairs w/R ascending rail.  Pt plan for 1st flr setup-sleep on couch. Equipment Currently Used at Home: cane, straight       Prior Level of Function  Ambulation: independent  Transferring: independent  Toileting: independent  Bathing: independent  Dressing: independent  Eating: independent  Equipment Currently Used at Home: cane, straight  Prior Functional Level Comment: Difficulty transferring off couch b/c no AC on 3rd flr, Ind comm mob w/o AD, mod I ADLs, Pt-dishes laundry/dtr-shares IADLs. doesn't drive.  Pt currently on disability           PT Evaluation - 08/07/18 1821        Session Details    Document Type initial evaluation    Mode of Treatment physical therapy;co-treatment    Patient/Family Observations pt rec'd in bed, lethargic.  c/o 6/10 R knee pain       Time Calculation    Start Time 1821    Stop Time 1857    Time Calculation (min) 36 min       General Information    Patient Profile Reviewed? yes    Pertinent History of Current Functional Problem 53 y.o RHD female adm 8/7/18 s/p R TKR by Dr Zamora    Existing Precautions/Restrictions fall;oxygen therapy device and L/min;weight bearing       Weight  Bearing Status    Right LE Weight Bearing Status weight bearing as tolerated       Orientation Log    Comment AOx4       Sensory    Sensory General Assessment no sensation deficits identified   intact BLE to LT       Range of Motion (ROM)    General Range of Motion lower extremity range of motion deficits identified    Comment, General Range of Motion full AROM LLE and RLE except R knee       General LE Assessment    Lower Extremity: Range of Motion knee, right: LE ROM deficit       ROM: Right Knee    Comment: Extension/Flexion -5oAROM in supine/82oAROM sitting in chair       Manual Muscle Testing (MMT)    General MMT Assessment lower extremity strength deficits identified    Comment 5/5 LLE       Lower Extremity    Comment RLE: 3-/5 hip flex, 2-/5 hip abd/add, knee flex/ext 3-/5, DF/PF 5/5       Bed Mobility    Bed Mobility rolling left;rolling right;supine to sit    [place], Roll Left supervision    [place], Roll Right supervision    [place], Supine to Sit supervision    Comment (Bed Mobility) HOB flat w/o rails       Transfers    Maintains Weight Bearing Status (Transfers) able to maintain weight bearing status       Bed to Chair Transfer    [place], Bed to Chair close supervision;verbal cues    Verbal Cues hand placement;preparatory posture;proper use of assistive device;safety;technique    Assistive Device walker, front-wheeled       Sit to Stand Transfer    [place], Sit to Stand Transfer verbal cues;minimum assist (75% patient effort)    Verbal Cues hand placement;safety;technique;proper use of assistive device    Assistive Device walker, front-wheeled       Stand to Sit Transfer    [place], Stand to Sit Transfer close supervision;verbal cues    Verbal Cues hand placement;proper use of assistive device;safety;technique    Assistive Device walker, front-wheeled       Stand Pivot/Stand Step Transfer    Comment see bed/chair       Gait Analysis/Training    Gait/Stairs Locomotion Gait  Training (Group)       Gait Training    [place], Gait close supervision;verbal cues    Assistive Device walker, front-wheeled    Distance in Feet 125 feet    Gait Pattern Utilized step-through    Gait Deviations Identified antalgic;right;decreased cornell;decreased step length    Maintains Weight Bearing Status able to maintain weight bearing status    Comment slow, steady step through->recirpcrocal pattern.  10MWT SSV w/RW with CS =55sec=not achieiving age-related norms       Static Sitting Balance    [place], Unsupported Sitting supervision    Sitting Position sitting on edge of bed    [place], Supported Sitting independent    Sitting Position long sitting on bed    Time Able to Maintain Position more than 5 minutes       Dynamic Sitting Balance    [place], Reaches Outside Midline close supervision    Sitting Position, Reaches Outside Midline sitting on edge of bed       Static Standing Balance    [place], Supported Standing close supervision    Assistive Device Utilized rolling walker    [place], Unsupported Standing not tested       AM-PAC (TM) - Mobility    Turning from your back to your side while in a flat bed without using bedrails? 3 - A Little    Moving from lying on your back to sitting on the side of a flat bed without using bedrails? 3 - A Little    Moving to and from a bed to a chair? 3 - A Little    Standing up from a chair using your arms? 3 - A Little    To walk in a hospital room? 3 - A Little    Climbing 3-5 steps with a railing? 3 - A Little    AM-PAC (TM) Mobility Score 18       PT Clinical Impression    Patient's Goals For Discharge return home;take care of myself at home    Plan For Care Reviewed: Physical Therapy PT plan for care discussed with patient;patient voices agreement with PT plan for care;patient feedback incorporated in PT plan for care    Impairments Found (PT Eval) aerobic capacity/endurance;arousal, attention, and cognition;ergonomics and body  mechanics;gait, locomotion, and balance;integumentary integrity;joint integrity and mobility;motor function;muscle performance;posture;ROM (range of motion)    Rehab Potential/Prognosis good, to achieve stated therapy goals    PT Frequency of Treatment 5-7 times per week    Problem List decreased ROM;decreased strength;impaired balance;impaired cognition;impaired postural control;pain    Anticipated Equipment Needs at Discharge front wheeled walker   qualifies for a bariatric RW    Expected Discharge Disposition home with home health    Daily Outcome Statement Pt presents with decreased arousal/lethargy but able to follow commands with stable VS, decreased balance, increased UE reliance onto RW to offload RLE due to pain, decreased endurance/ROM R knee affecting gait dysfxn.  Pt would benefit from cont PT at present level of care and follow up home vs skilled rehab due to home setup.                        Education provided this session. See the Patient Education summary report for full details.    PT Care Plan Goals      Most Recent Value   Bed Mobility Goal   Date Goal Established: Bed Mobility  08/07/18   Time to Achieve Goal: Bed Mobility  by discharge   Goal Activity: Bed Mobility  sit to supine/supine to sit   Level of [place] Goal: Bed Mobility  modified independence   Assistive Device: Bed Mobility  -- [std bed w/o rails vs couch]   Gait Goal   Date Goal Established: Gait Training  08/07/18   Time to Achieve Goal: Gait Training  by discharge   Level of [place]  modified independence   Assistive Device: Gait Training  walker, rolling   Distance Goal: Gait Training (feet)  200 feet   Transfer Goal   Date Goal Established: Transfer Training  08/07/18   Time to Achieve Goal: Transfer Training  by discharge   Goal Activity: Transfer Training  sit to stand/stand to sit, bed to chair/chair to bed   Level of [place] Goal: Transfer Training  modified independence   Assistive Device: Transfer Training   walker, rolling

## 2018-08-07 NOTE — PERIOPERATIVE NURSING NOTE
Patient remains very somulent as pre op.  DR. Horne at bedside.  Patient snoring, EZEQUIEL protocol innitated

## 2018-08-07 NOTE — ASSESSMENT & PLAN NOTE
con't seroquel and effexor, hold abilify at pt's request as she has not been taking this consistently at home

## 2018-08-07 NOTE — PLAN OF CARE
Problem: Patient Care Overview  Goal: Plan of Care Review  Outcome: Ongoing (interventions implemented as appropriate)   08/07/18 1852   Coping/Psychosocial   Plan Of Care Reviewed With patient   Plan of Care Review   Progress improving   Outcome Summary tolerated surgery well, alert and oriented, increased awaken with family present       Problem: Fall Risk (Adult)  Goal: Identify Related Risk Factors and Signs and Symptoms  Outcome: Ongoing (interventions implemented as appropriate)   08/07/18 1852   Fall Risk   Related Risk Factors (Fall Risk) fatigue/slow reaction;gait/mobility problems;environment unfamiliar     Goal: Absence of Falls  Outcome: Ongoing (interventions implemented as appropriate)   08/07/18 1852   Fall Risk (Adult)   Absence of Falls achieves outcome

## 2018-08-07 NOTE — PERIOPERATIVE NURSING NOTE
Spoke with Dr Horne regarding patient somulence  No monitor warrant, on EZEQUIEL protocol,  After speaking with Yadira Terrell explaining situation.  Respiratory therapy called to place on CPAP in room  VS stable. Neurovascular wnl.  ETCo2 mid 40's low 50's.  Daughter updated.

## 2018-08-07 NOTE — CONSULTS
Consult Note      Medical Management    HPI:  Kar Nicholas is a 53 y.o. female who was admitted today 08/07/18 for severe Arthritis of knee [M17.10] of her rt knee now s/p rt tka by Will Zamora MD at [facility]. Currently pt. denies Fever/Chills, N/V/D, SOB, Chest pain/Pressure, Palpitations, Cough/Wheezing, Abd Pain, Back Pain, Rash/Itch, Headache, Dizziness/Lightheadedness.    Subjective   Onset prior to arrival-month(s)  Onset Rate-are sudden in onset  Progression-worsened  Pain-Knee: right aching, pressure, sharp, shooting, throbbing, tight (pulling), uncomfortable and variable intensity  Risk factors-OA     Tolerating diet: yes    Allegies: Shellfish containing products    Home med reconciliation/list reviewed and noted below:    •  ARIPiprazole, Take one (1) tablet orally (by mouth) daily  •  atorvastatin, Take 10 mg by mouth every morning.    •  losartan, Take 50 mg by mouth every morning.    •  omeprazole, Take 1 capsule (20 mg total) by mouth daily before breakfast.  •  oxybutynin, Take one (1) tablet orally (by mouth) twice daily  •  prazosin, Take 2 capsules (4 mg total) by mouth nightly.  •  QUEtiapine, Take one (1) tablet orally (by mouth) three times daily  •  QUEtiapine, Take one (1) tablet orally (by mouth) three times daily  •  venlafaxine XR, Take one (1) capsule orally (by mouth) daily    Current med orders reviewed and noted below:    Current Facility-Administered Medications   Medication Dose Route Frequency Provider Last Rate Last Dose   • acetaminophen (TYLENOL) tablet 650 mg  650 mg oral TID Will Zamora MD   650 mg at 08/07/18 1748   • alum-mag hydroxide-simeth (MAALOX) 200-200-20 mg/5 mL suspension 30 mL  30 mL oral q4h PRN Will Zamora MD       • aspirin chewable tablet 81 mg  81 mg oral BID Will Zamora MD       • [START ON 8/8/2018] atorvastatin (LIPITOR) tablet 10 mg  10 mg oral q AM Nakul Richter MD       • ceFAZolin (ANCEF) NSS IVPB piggyback 2 g   2 g intravenous q8h INT Will Zamora MD   2 g at 08/07/18 1757   • dexamethasone (DECADRON) injection 10 mg  10 mg intravenous Daily Will Zamora MD   10 mg at 08/07/18 1252   • glucose chewable tablet 16-32 g of dextrose  16-32 g of dextrose oral PRN Will Zamora MD        Or   • dextrose 40 % oral gel 15-30 g of dextrose  15-30 g of dextrose oral PRN Will Zamora MD        Or   • glucagon (GLUCAGEN) injection 1 mg  1 mg intramuscular PRN Will Zamora MD        Or   • dextrose in water injection 12.5 g  25 mL intravenous PRN Will Zamora MD       • oxyCODONE (ROXICODONE) immediate release tablet 5-10 mg  5-10 mg oral q4h PRN Will Zamora MD        Or   • HYDROmorphone (DILAUDID) injection 0.25-0.5 mg  0.25-0.5 mg intravenous q3h PRN Will Zamora MD       • ketorolac (TORADOL) injection 15 mg  15 mg intravenous q6h [facility] Fatimah Agarwal CRNP   15 mg at 08/07/18 1649   • [START ON 8/8/2018] losartan (COZAAR) tablet 50 mg  50 mg oral q AM Nakul Richter MD       • ondansetron (ZOFRAN) injection 4 mg  4 mg intravenous q8h PRN Will Zamora MD       • pantoprazole (PROTONIX) tablet,delayed release (DR/EC) 40 mg  40 mg oral Daily Nakul Richter MD       • polyethylene glycol (MIRALAX) 17 gram packet 17 g  17 g oral Daily Will Zamora MD   17 g at 08/07/18 1750   • prazosin (MINIPRESS) capsule 4 mg  4 mg oral Nightly Nakul Richter MD       • QUEtiapine (SEROquel) tablet 50 mg  50 mg oral TID Nakul Richter MD       • sennosides-docusate sodium (SENOKOT-S) 8.6-50 mg per tablet 1 tablet  1 tablet oral BID Will Zamora MD       • sodium chloride 0.9 % infusion  20 mL/hr intravenous Continuous Will Zamora MD 20 mL/hr at 08/07/18 0814     • sodium chloride 0.9 % infusion   intravenous Continuous Will Zamora  mL/hr at 08/07/18 1648     • traMADol (ULTRAM) tablet 50 mg  50 mg oral q6h PRN Nakul Richter MD       • vancomycin  (VANCOCIN) 2,000 mg in sodium chloride 0.9 % 500 mL IVPB  2 g intravenous q12h INT Will Zamora MD       • [START ON 8/8/2018] venlafaxine XR (EFFEXOR-XR) 24 hr ER capsule 150 mg  150 mg oral Daily with breakfast Nakul Richter MD           Medical History:   Past Medical History:   Diagnosis Date   • Alcoholism (CMS/[facility]) ([facility])     In Recovery  Since October 2016    • Anxiety    • Asthma     Remote   • Bipolar affect, depressed (CMS/[facility]) ([facility])    • Body mass index (BMI) 45.0-49.9, adult (CMS/[facility]) ([facility])    • Cocaine addiction (CMS/[facility]) ([facility])      None since November 2016   • Depression    • GERD (gastroesophageal reflux disease)    • Hypertension    • Lipid disorder    • Migraines    • Morbid obesity with BMI of 45.0-49.9, adult (CMS/[facility]) ([facility])    • Osteoarthritis     Knees   • Post-menopause    • Pre-diabetes    • Schizophrenia (CMS/[facility]) ([facility])    • Sciatica    • Sleep apnea     Uses CPAP occas- To bring CPAP Day of Sx     Other:    Surgical History:   Past Surgical History:   Procedure Laterality Date   • COLONOSCOPY     • HYSTERECTOMY     • KNEE SURGERY Right    • REDUCTION MAMMAPLASTY Bilateral    • WISDOM TOOTH EXTRACTION       Other    Social History:   Social History     Social History   • Marital status: Single     Spouse name: N/A   • Number of children: N/A   • Years of education: N/A     Social History Main Topics   • Smoking status: Current Every Day Smoker     Packs/day: 0.25     Years: 30.00   • Smokeless tobacco: Never Used   • Alcohol use No      Comment: Recovering Alcoholic since 10/2016   • Drug use: Yes     Types: Cocaine      Comment: None since 11/2016   • Sexual activity: Defer     Other Topics Concern   • None     Social History Narrative   • None       Family History:   Family History   Problem Relation Age of Onset   • Colon cancer Mother    • Lung cancer Father    • Heart failure Brother    • Heart block Daughter          Review of Systems   Constitutional: Negative for chills,  fatigue and fever.   HENT: Negative for ear pain, postnasal drip and sore throat.    Eyes: Negative for visual disturbance.   Respiratory: Negative for cough, chest tightness, shortness of breath and wheezing.    Cardiovascular: Negative for chest pain and palpitations.   Gastrointestinal: Negative for abdominal distention, abdominal pain, constipation, n/v/d   Endocrine: Negative for polyuria.   Genitourinary: Negative for difficulty urinating and frequency.   Musculoskeletal: Negative for back pain and myalgias.   Skin: Negative for rash.   Neurological: Negative for dizziness and headaches. Pt did have some slurred speech as she was awakening from her anesthesia post operatively w/o localizing complaints - this has resolved s/p bipap and a nap.   Hematological: Does not bruise/bleed easily.   Psychiatric/Behavioral: Negative for agitation and confusion.     Vital signs in last 24 hours:  Temp:  [36.2 °C (97.1 °F)-37.2 °C (99 °F)] 36.5 °C (97.7 °F)  Heart Rate:  [64-86] 75  Resp:  [10-18] 14  BP: ()/() 148/100  FiO2 (%) (Set):  [40 %] 40 %    Objective     Physical Exam   Constitutional: WD/WN. No distress.    Head: Normocephalic and atraumatic.   Eyes: Conjunctivae and EOMI. PERRLA.   Neck: Normal range of motion. Neck supple.   Cardiovascular: Normal RRR, normal heart sounds and intact distal pulses.    Pulmonary/Chest:  No resp distress, BS CTA B/L. No Rales/Rhonchi/Wheezing.   Abdominal: Soft. NT. Bowel sounds are normal. No distension. + obese  Genitourinary: No suprapubic tenderness or fullness  Musculoskeletal: rt knee with ace wrap intact  Ext: No Cyanosis, Clubbing, Edema  Neurological: AAO x 3. Pt is w/o slurred speech or localizing findings on exam.  Skin: Skin is warm and dry. No rash noted.    Psychiatric:  Normal mood/affect. Behavior is normal. Judgment/Thought content nl        Labs  Lab Results   Component Value Date    WBC 4.95 08/03/2018    HGB 12.6 08/03/2018    HCT 39.5 08/03/2018     MCV 88.2 08/03/2018     08/03/2018     Lab Results   Component Value Date    GLUCOSE 86 08/03/2018    CALCIUM 9.2 08/03/2018     08/03/2018    K 4.2 08/03/2018    CO2 26 08/03/2018     08/03/2018    BUN 6 (L) 08/03/2018    CREATININE 1.0 08/03/2018     Lab Results   Component Value Date    ALT 15 08/03/2018    AST 16 08/03/2018    ALKPHOS 68 08/03/2018    BILITOT 0.4 08/03/2018       ECG/Telemetry  I have independently reviewed the telemetry. No events for the last 24 hours.    Assessment   53 y.o. female being consulted for management recommendations      Plan  Arthritis of knee  S/[p rt tka, PT eval and treat, IS 10x/day, check am labs, con't iv abx per ortho protocol    Gastroesophageal reflux disease  Restart prilosec 20mg po qday - sub protonix okay    Hypercholesterolemia  Restart lipitor 10mg po qday    Essential hypertension  con't losartan 50mg po qday - pt did take her bp med this am prior to surgery, and prazosin 4mg po qhs    Depression  Restart effexor xr 150mg po qday and seroquel 50mg po tid (decr from 150mg po tid for now), hold abilify post op at pt's request as she has not been taking this regularly.    Anxiety  con't seroquel at a reduced amt post op and con't effexor xr, pt does not wish to restart abilify.    Bipolar disorder, manic (CMS/[facility]) ([facility])  Restart seroquel and effexor, hold abilify at pt's request as she has not been taking this consistently at home    Schizophrenia (CMS/HCC) (formerly [facility])  con't seroquel and hold ablify post op at pt's request    Overactive bladder  Restart ditropan once pt has consistently urinated on her own post operatively    Obstructive sleep apnea  Pt will use bipap tonight that has been provided by bere post op - pt's own cpap is being repaired.

## 2018-08-07 NOTE — OR SURGEON
Pre-Procedure patient identification:  I am the primary operating surgeon/proceduralist and I have identified the patient on 08/07/18 at 11:16 AM Will Zamora MD  Phone Number: [phone]

## 2018-08-07 NOTE — ASSESSMENT & PLAN NOTE
con't seroquel at a reduced amt post op and con't effexor xr, pt does not wish to restart abilify.

## 2018-08-07 NOTE — DISCHARGE INSTRUCTIONS
TOTAL KNEE      MEDICATION:    If you are taking Aspirin:  Enteric coated aspirin 2 times a day for blood clot prevention and take for 6 weeks.  May use over-the-counter Pepcid or Zantac if stomach upset occurs.    PAIN CONTROL:    You will be given a prescription for pain medication. Take your pain medicine as prescribed with food if possible. You can expect to have pain during the healing process from the incision and it will improve.     Use anti-inflammatories everyday (if prescribed) until you see your doctor. Opioid pain reliever is to be taken only as needed for pain.    DO NOT DRIVE WHILE TAKING PAIN MEDICATION.    Some patients find that they have some difficulty with constipation after surgery. This is due to a combination of things. Most of this is due to the pain medication you are taking. The pain medications, along with a decrease in activity, can sometimes lead to discomfort from constipation. You should eat plenty of fresh fruits, vegetables, drink fruit juices and drink plenty of water.    INCISION CARE:  Look at incision every day. Keep incision clean and dry.  Maintain Mepilex dressing for 5 days total.  Maintain DEUCE stockings. May remove for showering and at night while sleeping.  If you have steri-strips, leave them on until they fall off.  Your staples or stitches will be removed about 18 to 24 days after surgery. Your incision will heal and the swelling and bruising will get better over the next 3 weeks.  If you have glue closing your incision, do NOT pull or pick off.  It will dissolve naturally.  You may shower but no tub baths, swimming, jacuzzi tubs, or any other activity that would require submersion of your incision in water.      Call your doctor if you notice any of the following:  Fever over 101.5 degrees  Drainage from incision  Increased swelling around incision  Increased redness around incision line  Thigh/calf pain or swelling in legs  Chest pain  Chest congestion  Problems with  breathing    ACTIVITY:  You may progress to a cane when ready.  You may put as much weight as you can tolerate on your operative leg (unless instructed otherwise).  Balance rest and activity.    DO NOT SMOKE! Smoking is not healthy for your healing process.  No driving for at least 3 weeks. You must also be off of prescription opioids.    KNEE PRECAUTIONS:  No pillow under the knee.  No twisting or kneeling.    FOLLOW Up:  Call now for an appointment in 3 weeks from your date of surgery with Dr. Zamora. [phone]        *FOR ANY ORTHOPEDIC ISSUES OR CONCERNS THAT NEED TO BE ADDRESSED IN PERSON, YOU MAY REPORT TO THE URGENT CARE LOCATED AT THE [facility] WHICH HAS EVENING AND WEEKEND HOURS, INSTEAD OF REPORTING TO THE EMERGENCY ROOM

## 2018-08-07 NOTE — PLAN OF CARE
Problem: Patient Care Overview  Goal: Plan of Care Review  Outcome: Ongoing (interventions implemented as appropriate)   08/07/18 3077   Coping/Psychosocial   Plan Of Care Reviewed With patient   Plan of Care Review   Progress progress toward functional goals as expected   Outcome Summary pt is CS bed mob, Ayah transfers, CS amb 125ft w/RW.        Problem: Fall Risk (Adult)  Goal: Absence of Falls  Outcome: Ongoing (interventions implemented as appropriate)      Problem: Acute Therapy Services Goal & Intervention Plan  Goal: Bed Mobility Goal  Outcome: Ongoing (interventions implemented as appropriate)    Goal: Gait Training Goal  Outcome: Ongoing (interventions implemented as appropriate)    Goal: Transfer Training Goal  Outcome: Ongoing (interventions implemented as appropriate)

## 2018-08-07 NOTE — PERIOPERATIVE NURSING NOTE
CPM applied 0-60 . Patient continues to snore does not stay awake for simple questions. ETCO2 in the low 50's several chin lifts and head rotations to stimulate awareness with little effect.

## 2018-08-07 NOTE — BRIEF OP NOTE
KNEE ARTHROPLASTY TOTAL (R) Procedure Note    Procedure:    KNEE ARTHROPLASTY TOTAL  CPT(R) Code:  43373 - HI TOTAL KNEE ARTHROPLASTY      * No Diagnosis Codes entered *       * No Diagnosis Codes entered *    Surgeon(s) and Role:     * Will Zamora MD - Primary    Anesthesia: Choice    Staff:   Circulator: Ruthie Horvath RN  Scrub Person: [place] Vines  Registered Nurse First Assistant: Thais Wilks RN    Procedure Details   Right total knee    Estimated Blood Loss: 30 mL    Specimens:                No specimens collected during this procedure.      Drains:      Implants:   Implant Name Type Inv. Item Serial No.  Lot No. LRB No. Used   CEMENT BONE SMART SET MV 40G - [id number]  CEMENT BONE SMART SET MV 40G  [facility] [id number] Right 2   RESURFACING ROUND PATELLAR COMPONENT JOURNEY - [id number]  RESURFACING ROUND PATELLAR COMPONENT JOURNEY  [place]  [id number] Right 1   Tibial Baseplate    SMITH  [id number] Right 1   INSERT ARTIC JOURNEY II BCS XLPE SZ 3-4 RT 13MM - [id number]  INSERT ARTIC JOURNEY II BCS XLPE SZ 3-4 RT 13MM  [place]  [id number] Right 1   Femoral Component       [place]  [id number] Right 1              Complications:  None; patient tolerated the procedure well.           Disposition: PACU - hemodynamically stable.           Condition: stable    Will Zamora MD  Phone Number: [phone]

## 2018-08-07 NOTE — ASSESSMENT & PLAN NOTE
con't effexor xr 150mg po qday and seroquel 50mg po tid - can incr to 150mg po tid now, hold abilify post op at pt's request as she has not been taking this regularly.

## 2018-08-07 NOTE — PROGRESS NOTES
Patient: Kar Nicholas  Location: [facility] 4A 4003  MRN: [id number]  Today's date: 8/7/2018    Patient  seated in bedside chair over incontinence pad and draw sheet, alarm on, needs in reach, nursing notified .           Therapy Pain/Vitals - 08/07/18 1846        Vital Signs    Pulse 72    SpO2 95 %    O2 Flow Rate (L/min) 3 L/min    BP (!)  143/82    BP Location Left upper arm    BP Method Automatic    Patient Position Sitting   post SPT w/RW          Prior Living Environment  Lives With: child(vj), adult  Living Arrangements: house  Living Environment Comment: 3SH, 1st flr OH/laundry room, main living area, 2nd flr B/B-tub shower, 3rd flr pt's bedroom, 5STE w/L ascending rail on 3 steps only.  FF stairs w/R ascending rail.  Pt plan for 1st flr setup-sleep on couch. Equipment Currently Used at Home: cane, straight       Prior Level of Function  Ambulation: independent  Transferring: independent  Toileting: independent  Bathing: independent  Dressing: independent  Eating: independent  Equipment Currently Used at Home: cane, straight  Prior Functional Level Comment: Difficulty transferring off couch b/c no AC on 3rd flr, Ind comm mob w/o AD, mod I ADLs, Pt-dishes laundry/dtr-shares IADLs. doesn't drive.  Pt currently on disability           OT Evaluation - 08/07/18 1856        Session Details    Document Type initial evaluation    Mode of Treatment occupational therapy    Patient/Family Observations --   recieved in bed somnolent        Time Calculation    Start Time 1824    Stop Time 1856    Time Calculation (min) 32 min       General Information    Patient Profile Reviewed? yes    Onset of Illness/Injury or Date of Surgery 08/07/18    Referring Physician Sera    Pertinent History of Current Functional Problem admitted  for R TKA   PMHx: OA, EZEQUIEL , bipolar d/o    Existing Precautions/Restrictions fall;weight bearing       Weight Bearing Status    Right LE Weight Bearing Status weight bearing as  tolerated       Coping Strategies    Counseling (Coping Strategies) goal setting facilitated;personal strengths utilized;positive reinforcement provided    Trust Relationship/Rapport care explained;questions answered;questions encouraged;reassurance provided       Orientation Log    City 3-->spontaneous/free recall    Kind of Place 3-->spontaneous/free recall    Name of [facility] 3-->spontaneous/free recall    Month 3-->spontaneous/free recall    Date 3-->spontaneous/free recall    Year 3-->spontaneous/free recall    Day of Week 3-->spontaneous/free recall    Clock Time 3-->spontaneous/free recall    Etiology/Event 3-->spontaneous/free recall    Pathology Deficits 3-->spontaneous/free recall    Total Score 30       Cognition/Psychosocial    Safety Awareness intact       Attention    Quiet Environment WFL       Follows Commands/Answers Questions    Follows Commands/Directions single-step commands followed 100% of time;2-step commands followed 50% of time    Answers Questions Verbally complex questions answered without difficulty       Problem Solving Assessment    Problem Solving cueing patient to maintain safety/make decisions or self correct errors       Sensory    Sensory General Assessment no sensation deficits identified       Range of Motion (ROM)    General Range of Motion no range of motion deficits identified    Comment, General Range of Motion right hand dominant        Bed Mobility    [place], Supine to Sit close supervision;verbal cues    Verbal Cues (Supine to Sit) hand placement       Transfers    Maintains Weight Bearing Status (Transfers) able to maintain weight bearing status       Bed to Chair Transfer    [place], Bed to Chair close supervision    Verbal Cues hand placement;preparatory posture;proper use of assistive device       Sit to Stand Transfer    [place], Sit to Stand Transfer minimum assist (75% patient effort)    Assistive Device walker, front-wheeled       Stand to Sit  Transfer    [place], Stand to Sit Transfer minimum assist (75% patient effort)    Assistive Device walker, front-wheeled       Upper Body Dressing    Upper Body Dressing Tasks don;hospital gown    Upper Body Dressing Position edge of bed sitting    Upper Body Dressing [place] minimum assist (75% or more patient effort)    Comment --   due to lines        Grooming    Self-Performance washes, rinses and dries hands;oral care (brushing teeth, cleaning dentures    Grooming Position supported sitting    [place] distant supervision;set up       Static Sitting Balance    [place], Unsupported Sitting supervision;verbal cues    [place], Supported Sitting independent       Dynamic Sitting Balance    [place], Reaches Outside Midline distant supervision       Static Standing Balance    [place], Supported Standing supervision    Time Able to Maintain Position 3 to 4 minutes    Assistive Device Utilized rolling walker       AM-PAC (TM) - ADL    Putting on and taking off regular lower body clothing? 2 - A Lot    Bathing? 2 - A Lot    Toileting? 2 - A Lot    Putting on/taking off regular upper body clothing? 3 - A Little    How much help for taking care of personal grooming? 3 - A Little    Eating meals? 4 - None    AM-PAC (TM) ADL Score 16       OT Clinical Impression    Patient's Goals For Discharge take care of myself at home    Plan For Care Reviewed: Occupational Therapy patient feedback incorporated in OT plan for care;OT plan for care discussed with patient    Impairments Found (OT Eval) joint integrity and mobility;gait, locomotion, and balance;muscle performance    Functional Limitations in Following Categories self-care    OT Frequency of Treatment 3-5 times per week    Problem List: Occupational Therapy decreased flexibility;postural control impaired;pain    Activity Limitations Related to Problem List BADL activities not performed adequately or safely;functional mobility not performed  adequately or safely for household activity    Anticipated Equipment Needs at Discharge front wheeled walker;shower chair;dressing equipment   bariatric DME    Expected Discharge Disposition home with home health    Daily Outcome Statement  patient  demonstrated  good tolerance  with initial mobility                         Education provided this session. See the Patient Education summary report for full details.    OT Care Plan Goals      Most Recent Value   Bathing Goal   Time to Achieve Goal: Bathing  by discharge   Level of [place] Goal: Bathing  supervision required, set up required   Adaptive Equipment: Bathing Goal  tub bench   Bed Mobility Goal   Date Goal Established: Bed Mobility  08/07/18   Time to Achieve Goal: Bed Mobility  by discharge   Goal Activity: Bed Mobility  sit to supine/supine to sit   Level of [place] Goal: Bed Mobility  modified independence   Assistive Device: Bed Mobility  -- [std bed w/o rails vs couch]   Grooming Goal   Time to Achieve Goal: Grooming  by discharge   Level of [place] Goal: Grooming  modified independence   Position: Grooming  standing   Lower Body Dressing Goal   Time to Achieve Goal: Lower Body Dressing  by discharge   Level of [place]  supervision required, set up required   Adaptive Equipment: Lower Body Dressing  reacher, shoe horn, long handled, sock-aid   Toileting Goal   Time to Achieve Goal: Toileting  by discharge   Level of [place] Goal: Toileting  set up required, supervision required   Adaptive Equipment: Toileting  toilet seat, raised   Transfer Goal   Date Goal Established: Transfer Training  08/07/18   Time to Achieve Goal: Transfer Training  by discharge   Goal Activity: Transfer Training  sit to stand/stand to sit, bed to chair/chair to bed   Level of [place] Goal: Transfer Training  modified independence   Assistive Device: Transfer Training  walker, rolling

## 2018-08-07 NOTE — OR SURGEON
Pre-Procedure patient identification:  I am the primary operating surgeon/proceduralist and I have identified the patient on 08/07/18 at 9:02 AM Will Zamora MD  Phone Number: [phone]

## 2018-08-07 NOTE — ANESTHESIA PREPROCEDURE EVALUATION
"Anesthesia ROS/MED HX      Pulmonary    asthma   Sleep apnea  Neuro/Psych    Headaches  Cardiovascular   ECG reviewed   hypertension  GI/Hepatic   GERD  Endo/Other   Body Habitus: Morbidly Obese  ROS/MED HX Comments:    ECG: Sinus  Rhythm   WITHIN NORMAL LIMITS    Past Medical History:   Diagnosis Date   • Alcoholism (CMS/[facility]) ([facility])     In Recovery  Since October 2016    • Anxiety    • Asthma     Remote   • Bipolar affect, depressed (CMS/[facility]) ([facility])    • Body mass index (BMI) 45.0-49.9, adult (CMS/[facility]) ([facility])    • Cocaine addiction (CMS/[facility]) ([facility])      None since November 2016   • Depression    • GERD (gastroesophageal reflux disease)    • Hypertension    • Lipid disorder    • Migraines    • Morbid obesity with BMI of 45.0-49.9, adult (CMS/[facility]) ([facility])    • Osteoarthritis     Knees   • Post-menopause    • Pre-diabetes    • Schizophrenia (CMS/[facility]) ([facility])    • Sciatica    • Sleep apnea     Uses CPAP occas- To bring CPAP Day of Sx       Past Surgical History:   Procedure Laterality Date   • COLONOSCOPY     • HYSTERECTOMY     • KNEE SURGERY Right    • REDUCTION MAMMAPLASTY Bilateral    • WISDOM TOOTH EXTRACTION         Physical Exam    Airway   Mallampati: III   TM distance: >3 FB   Neck ROM: full  Cardiovascular - normal   Rhythm: regular   Rate: normal  Pulmonary - normal   clear to auscultation  Dental - normal      Blood pressure 106/61, pulse 86, temperature 36.4 °C (97.5 °F), temperature source Temporal, resp. rate 18, height 1.727 m (5' 8\"), weight (!) 138 kg (304 lb), SpO2 95 %.    Anesthesia Plan    Plan: spinal    Technique: spinal   ASA 3  Anesthetic plan and risks discussed with: patient  Induction:    intravenous     "

## 2018-08-07 NOTE — ANESTHESIA PROCEDURE NOTES
Spinal Block    Patient location during procedure: OR  Start time: 8/7/2018 9:55 AM  End time: 8/7/2018 9:59 AM  Staffing  Anesthesiologist: ROBIN BURNETT  Reason for block: at surgeon's request  Preanesthetic Checklist  Completed: patient identified, surgical consent, pre-op evaluation, timeout performed, IV checked, risks and benefits discussed, monitors and equipment checked and sterile field maintained during procedure  Spinal Block  Patient position: sitting  Prep: ChloraPrep and site prepped and draped  Patient monitoring: heart rate, cardiac monitor, continuous pulse ox and blood pressure  Approach: midline  Location: L3-4  Injection technique: single-shot  Needle  Needle type: Sprotte   Needle gauge: 24 G  Needle length: 5 in  Needle insertion depth: 11 cm  Assessment  Sensory level: T10  Events: cerebrospinal fluid  Additional Notes  Procedure well tolerated. Vital signs stable.

## 2018-08-07 NOTE — PLAN OF CARE
Problem: Patient Care Overview  Goal: Plan of Care Review  Outcome: Ongoing (interventions implemented as appropriate)   08/07/18 1991   Coping/Psychosocial   Plan Of Care Reviewed With patient   Plan of Care Review   Progress progress toward functional goals as expected   Outcome Summary explanation of role of OT provided toward progression of BADL performance to prior level of function       Problem: Fall Risk (Adult)  Goal: Absence of Falls  Outcome: Ongoing (interventions implemented as appropriate)      Problem: Acute Therapy Services Goal & Intervention Plan  Goal: Bathing Goal  Outcome: Ongoing (interventions implemented as appropriate)    Goal: Grooming Goal  Outcome: Ongoing (interventions implemented as appropriate)    Goal: Lower Body Dressing Goal  Outcome: Ongoing (interventions implemented as appropriate)    Goal: Toilet Transfer Goal  Outcome: Ongoing (interventions implemented as appropriate)    Goal: Toileting Goal  Outcome: Ongoing (interventions implemented as appropriate)    Goal: Tub-Shower Transfer Goal  Outcome: Ongoing (interventions implemented as appropriate)

## 2018-08-07 NOTE — OP NOTE
Pre-op Diagnosis: Severe Arthritis of the right knee  Post-op Diagnosis: same    Procedure right total knee replacement    Surgeon: Will Zamora MD  Assistant: Yolanda Wilks    Specimen: None    Anesthesia: Regional    EBL: Minimal    The patient was taken to the operating room where regional anesthesia was instituted by the anesthesiologist. Next the right  Knee was prepped and draped in usual sterile fashion. Next the limb was exsanguinated with an Esmarch bandage and the tourniquet was inflated to 350 mmHg. An anterior approach and he was utilizing incision was carried out through the subcutaneous tissue down to the level of the fascia. The fascia was then divided in a standard median parapatellar patella arthrotomy. Next a freehand osteotomy of the patella was performed and the patella was sized to 35 mm. A patella button of that size was then applied. Next the drill was used to gain access to intramedullary canal the femur area was thoroughly irrigated and evacuated.. The guide helen was placed and the distal femur was then cut at 6° of valgus in standard manner. The femur was then sized to size 5 and the distal femur was then cut using that size cutting block to accommodate a femoral component. Attention was turned to the tibia where extramedullary alignment was used to make a perpendicular cut of the proximal tibia in standard manner. Next a laminar  was placed to debride the posterior aspect of the knee of osteophytes and meniscal remnants. The tibia was then sized to a size 4. A trial reduction using the previously mentioned sizes for femur and tibia respectively along with a 13 mm tibial component was felt to produce satisfactory range of motion stability kinematics balance and patellar tracking. These trials were then deemed acceptable and subsequently the trials were removed from the femur the knee was thoroughly irrigated and evacuated and then dried. Next the cement was mixed in the  doughy cement was infiltrated all the cut bony surfaces and the final components of the Journey II total knee replacement system by [employer] was then cemented into place.  Size 5 femur, size 4 tibia, 13 mm. Polyethylene spacer, and 35 mm patella were then cemented into place. Excess cement was removed from all areas and the knee was then evaluated for range of motion stability kinematics balance and patella tracking, all of which were satisfactory. At this point time dilute Betadine solutions placed into the wound for approximately 2 minutes was thoroughly irrigated and evacuated and then the wound was closed with #2 Quill the deep layer O- Quill in the subcutaneous layer and 3-0 Vicryl in the subcuticular layer augmented with Dermabond. Finally prior to the completion of closure, dilute Marcaine solution was infiltrated into the manda-incisional tissues for postoperative pain control. A sterile compressive dressing was then placed and the tourniquet was then released the patient was then transferred to the recovery room in stable condition. I was present for the entire case.

## 2018-08-08 ENCOUNTER — APPOINTMENT (INPATIENT)
Dept: PHYSICAL THERAPY | Facility: HOSPITAL | Age: 53
DRG: 470 | End: 2018-08-08
Payer: MEDICARE

## 2018-08-08 PROBLEM — D62 ACUTE BLOOD LOSS ANEMIA: Status: ACTIVE | Noted: 2018-08-08

## 2018-08-08 LAB
ANION GAP SERPL CALC-SCNC: 8 MEQ/L (ref 3–15)
BUN SERPL-MCNC: 16 MG/DL (ref 8–20)
CALCIUM SERPL-MCNC: 8.2 MG/DL (ref 8.9–10.3)
CHLORIDE SERPL-SCNC: 106 MMOL/L (ref 98–109)
CO2 SERPL-SCNC: 24 MMOL/L (ref 22–32)
CREAT SERPL-MCNC: 1 MG/DL (ref 0.6–1.1)
ERYTHROCYTE [DISTWIDTH] IN BLOOD BY AUTOMATED COUNT: 13.7 % (ref 11.7–14.4)
GFR SERPL CREATININE-BSD FRML MDRD: >60 ML/MIN/1.73M*2
GLUCOSE SERPL-MCNC: 163 MG/DL (ref 70–99)
HCT VFR BLDCO AUTO: 35.6 % (ref 35–45)
HGB BLD-MCNC: 11.2 G/DL (ref 11.8–15.7)
MCH RBC QN AUTO: 28.1 PG (ref 28–33.2)
MCHC RBC AUTO-ENTMCNC: 31.5 G/DL (ref 32.2–35.5)
MCV RBC AUTO: 89.2 FL (ref 83–98)
PDW BLD AUTO: 10.9 FL (ref 9.4–12.3)
PLATELET # BLD AUTO: 191 K/UL (ref 150–369)
POTASSIUM SERPL-SCNC: 4.3 MMOL/L (ref 3.6–5.1)
RBC # BLD AUTO: 3.99 M/UL (ref 3.93–5.22)
SODIUM SERPL-SCNC: 138 MMOL/L (ref 136–144)
WBC # BLD AUTO: 10.09 K/UL (ref 3.8–10.5)

## 2018-08-08 PROCEDURE — 36415 COLL VENOUS BLD VENIPUNCTURE: CPT | Performed by: ORTHOPAEDIC SURGERY

## 2018-08-08 PROCEDURE — 97535 SELF CARE MNGMENT TRAINING: CPT | Mod: GO

## 2018-08-08 PROCEDURE — 80048 BASIC METABOLIC PNL TOTAL CA: CPT | Performed by: ORTHOPAEDIC SURGERY

## 2018-08-08 PROCEDURE — 63700000 HC SELF-ADMINISTRABLE DRUG: Performed by: NURSE PRACTITIONER

## 2018-08-08 PROCEDURE — 97110 THERAPEUTIC EXERCISES: CPT | Mod: GP

## 2018-08-08 PROCEDURE — 12000000 HC ROOM AND CARE MED/SURG

## 2018-08-08 PROCEDURE — 63600000 HC DRUGS/DETAIL CODE: Performed by: ORTHOPAEDIC SURGERY

## 2018-08-08 PROCEDURE — 63600000 HC DRUGS/DETAIL CODE: Performed by: NURSE PRACTITIONER

## 2018-08-08 PROCEDURE — 63700000 HC SELF-ADMINISTRABLE DRUG: Performed by: INTERNAL MEDICINE

## 2018-08-08 PROCEDURE — 63700000 HC SELF-ADMINISTRABLE DRUG: Performed by: ORTHOPAEDIC SURGERY

## 2018-08-08 PROCEDURE — 97116 GAIT TRAINING THERAPY: CPT | Mod: GP

## 2018-08-08 PROCEDURE — 85027 COMPLETE CBC AUTOMATED: CPT | Performed by: ORTHOPAEDIC SURGERY

## 2018-08-08 RX ORDER — QUETIAPINE FUMARATE 25 MG/1
150 TABLET, FILM COATED ORAL NIGHTLY
Status: DISCONTINUED | OUTPATIENT
Start: 2018-08-09 | End: 2018-08-08

## 2018-08-08 RX ORDER — AMOXICILLIN 250 MG
1 CAPSULE ORAL 2 TIMES DAILY
Qty: 178 TABLET | Refills: 0 | COMMUNITY
Start: 2018-08-08 | End: 2018-10-11

## 2018-08-08 RX ORDER — ACETAMINOPHEN 325 MG/1
650 TABLET ORAL EVERY 6 HOURS PRN
COMMUNITY
Start: 2018-08-08 | End: 2018-10-11

## 2018-08-08 RX ORDER — QUETIAPINE FUMARATE 100 MG/1
100 TABLET, FILM COATED ORAL NIGHTLY
Status: DISCONTINUED | OUTPATIENT
Start: 2018-08-09 | End: 2018-08-09 | Stop reason: HOSPADM

## 2018-08-08 RX ORDER — VENLAFAXINE HYDROCHLORIDE 150 MG/1
150 CAPSULE, EXTENDED RELEASE ORAL
Status: DISCONTINUED | OUTPATIENT
Start: 2018-08-08 | End: 2018-08-09 | Stop reason: HOSPADM

## 2018-08-08 RX ORDER — POLYETHYLENE GLYCOL 3350 17 G/17G
17 POWDER, FOR SOLUTION ORAL DAILY
Qty: 88 PACKET | Refills: 0 | COMMUNITY
Start: 2018-08-09 | End: 2018-10-11

## 2018-08-08 RX ORDER — HYDROMORPHONE HYDROCHLORIDE 1 MG/ML
.25-.5 INJECTION, SOLUTION INTRAMUSCULAR; INTRAVENOUS; SUBCUTANEOUS
Status: DISCONTINUED | OUTPATIENT
Start: 2018-08-08 | End: 2018-08-09 | Stop reason: HOSPADM

## 2018-08-08 RX ORDER — OXYCODONE HYDROCHLORIDE 5 MG/1
5-10 TABLET ORAL
Status: DISCONTINUED | OUTPATIENT
Start: 2018-08-08 | End: 2018-08-09 | Stop reason: HOSPADM

## 2018-08-08 RX ORDER — MELOXICAM 7.5 MG/1
7.5 TABLET ORAL DAILY
Qty: 30 TABLET | Refills: 0 | Status: SHIPPED | OUTPATIENT
Start: 2018-08-08 | End: 2019-01-21

## 2018-08-08 RX ORDER — NAPROXEN SODIUM 220 MG/1
81 TABLET, FILM COATED ORAL 2 TIMES DAILY
Qty: 178 TABLET | Refills: 0 | COMMUNITY
Start: 2018-08-08 | End: 2018-10-11

## 2018-08-08 RX ORDER — OXYCODONE HYDROCHLORIDE 5 MG/1
5-10 TABLET ORAL EVERY 4 HOURS PRN
Qty: 60 TABLET | Refills: 0 | Status: SHIPPED | OUTPATIENT
Start: 2018-08-08 | End: 2019-01-21

## 2018-08-08 RX ADMIN — DEXAMETHASONE SODIUM PHOSPHATE 10 MG: 4 INJECTION, SOLUTION INTRAMUSCULAR; INTRAVENOUS at 08:32

## 2018-08-08 RX ADMIN — KETOROLAC TROMETHAMINE 15 MG: 15 INJECTION, SOLUTION INTRAMUSCULAR; INTRAVENOUS at 12:24

## 2018-08-08 RX ADMIN — OXYCODONE HYDROCHLORIDE 10 MG: 5 TABLET ORAL at 20:24

## 2018-08-08 RX ADMIN — Medication 2 G: at 03:00

## 2018-08-08 RX ADMIN — QUETIAPINE FUMARATE 50 MG: 25 TABLET ORAL at 12:41

## 2018-08-08 RX ADMIN — LOSARTAN POTASSIUM 50 MG: 50 TABLET, FILM COATED ORAL at 12:19

## 2018-08-08 RX ADMIN — OXYCODONE HYDROCHLORIDE 5 MG: 5 TABLET ORAL at 03:12

## 2018-08-08 RX ADMIN — OXYCODONE HYDROCHLORIDE 10 MG: 5 TABLET ORAL at 08:39

## 2018-08-08 RX ADMIN — QUETIAPINE FUMARATE 50 MG: 25 TABLET ORAL at 18:47

## 2018-08-08 RX ADMIN — SENNOSIDES AND DOCUSATE SODIUM 1 TABLET: 8.6; 5 TABLET ORAL at 08:33

## 2018-08-08 RX ADMIN — OXYCODONE HYDROCHLORIDE 10 MG: 5 TABLET ORAL at 16:29

## 2018-08-08 RX ADMIN — ACETAMINOPHEN 650 MG: 325 TABLET, FILM COATED ORAL at 12:41

## 2018-08-08 RX ADMIN — ATORVASTATIN CALCIUM 10 MG: 10 TABLET, FILM COATED ORAL at 08:39

## 2018-08-08 RX ADMIN — ACETAMINOPHEN 650 MG: 325 TABLET, FILM COATED ORAL at 18:58

## 2018-08-08 RX ADMIN — ACETAMINOPHEN 650 MG: 325 TABLET, FILM COATED ORAL at 08:31

## 2018-08-08 RX ADMIN — ASPIRIN 81 MG 81 MG: 81 TABLET ORAL at 08:33

## 2018-08-08 RX ADMIN — POLYETHYLENE GLYCOL 3350 17 G: 17 POWDER, FOR SOLUTION ORAL at 08:34

## 2018-08-08 RX ADMIN — PANTOPRAZOLE SODIUM 40 MG: 40 TABLET, DELAYED RELEASE ORAL at 08:33

## 2018-08-08 RX ADMIN — QUETIAPINE FUMARATE 50 MG: 25 TABLET ORAL at 08:33

## 2018-08-08 RX ADMIN — ASPIRIN 81 MG 81 MG: 81 TABLET ORAL at 18:48

## 2018-08-08 RX ADMIN — OXYCODONE HYDROCHLORIDE 10 MG: 5 TABLET ORAL at 12:42

## 2018-08-08 RX ADMIN — OXYCODONE HYDROCHLORIDE 5 MG: 5 TABLET ORAL at 04:12

## 2018-08-08 RX ADMIN — KETOROLAC TROMETHAMINE 15 MG: 15 INJECTION, SOLUTION INTRAMUSCULAR; INTRAVENOUS at 06:40

## 2018-08-08 RX ADMIN — SENNOSIDES AND DOCUSATE SODIUM 1 TABLET: 8.6; 5 TABLET ORAL at 18:47

## 2018-08-08 RX ADMIN — KETOROLAC TROMETHAMINE 15 MG: 15 INJECTION, SOLUTION INTRAMUSCULAR; INTRAVENOUS at 18:47

## 2018-08-08 ASSESSMENT — COGNITIVE AND FUNCTIONAL STATUS - GENERAL
TOILETING: 3 - A LITTLE
WALKING IN HOSPITAL ROOM: 4 - NONE
HELP NEEDED FOR PERSONAL GROOMING: 3 - A LITTLE
EATING MEALS: 4 - NONE
HELP NEEDED FOR BATHING: 3 - A LITTLE
STANDING UP FROM CHAIR USING ARMS: 4 - NONE
DRESSING REGULAR LOWER BODY CLOTHING: 3 - A LITTLE
CLIMB 3 TO 5 STEPS WITH RAILING: 2 - A LOT
DRESSING REGULAR UPPER BODY CLOTHING: 4 - NONE
MOVING TO AND FROM BED TO CHAIR: 4 - NONE

## 2018-08-08 NOTE — PROGRESS NOTES
Patient: Kar Nicholas  Location: [facility] 4A 4003  MRN: [id number]  Today's date: 8/8/2018  Pt sitting in chair, h/o to tech for transport, RN aware.        Therapy Pain/Vitals - 08/08/18 1205        Pain/Comfort/Sleep    Presence of Pain complains of pain/discomfort    Preferred Pain Scale number (Numeric Rating Pain Scale)    Pain Body Location - Side Right    Pain Body Location knee    Pain Rating (0-10): Rest 7    Pain Rating (0-10): Activity 7          Prior Living Environment  Lives With: child(vj), adult  Living Arrangements: house  Living Environment Comment: 3SH, 1st flr MA/laundry room, main living area, 2nd flr B/B-tub shower, 3rd flr pt's bedroom, 5STE w/L ascending rail on 3 steps only.  FF stairs w/R ascending rail.  Pt plan for 1st flr setup-sleep on couch. Equipment Currently Used at Home: cane, straight, CPAP       Prior Level of Function  Ambulation: assistive equipment  Transferring: assistive equipment  Toileting: independent  Bathing: independent  Dressing: independent  Eating: independent  Communication: understands/communicates without difficulty  Swallowing: swallows foods/liquids without difficulty  Equipment Currently Used at Home: cane, straight, CPAP  Prior Functional Level Comment: Difficulty transferring off couch b/c no AC on 3rd flr, Ind comm mob w/o AD, mod I ADLs, Pt-dishes laundry/dtr-shares IADLs. doesn't drive.  Pt currently on disability           OT Treatment Summary - 08/08/18 1205        Session Details    Document Type daily treatment    Mode of Treatment occupational therapy    Patient/Family Observations in gym, cooperative       Time Calculation    Start Time 1115    Stop Time 1205    Time Calculation (min) 50 min       General Information    Patient Profile Reviewed? yes    Onset of Illness/Injury or Date of Surgery 08/07/18    Referring Physician Sera    Pertinent History of Current Functional Problem R TKA    Existing Precautions/Restrictions fall;weight  bearing       Weight Bearing Status    Right LE Weight Bearing Status weight bearing as tolerated       Coping Strategies    Counseling (Coping Strategies) self-care encouraged    Trust Relationship/Rapport care explained;questions answered;choices provided       Toilet Transfer    [place], Toilet Transfer minimum assist (75% patient effort)    Assistive Device commode chair    Comment with RW       Tub Transfer Training    Comment denied       Lower Body Dressing    Lower Body Dressing Tasks doff;don;socks;underwear    Lower Body Dressing Position supported sitting    Lower Body Dressing Device dressing stick;reacher;sock-aid    Lower Body Dressing [place] modified independence   after skilled instruction    Comment seated LB dressing       AM-PAC (TM) - ADL    Putting on and taking off regular lower body clothing? 3 - A Little    Bathing? 3 - A Little    Toileting? 3 - A Little    Putting on/taking off regular upper body clothing? 4 - None    How much help for taking care of personal grooming? 3 - A Little    Eating meals? 4 - None    AM-PAC (TM) ADL Score 20       OT Clinical Impression    Patient's Goals For Discharge take care of myself at home;return to all previous roles/activities    Plan For Care Reviewed: Occupational Therapy OT plan for care discussed with patient;patient voices agreement with OT plan for care;patient feedback incorporated in OT plan for care    Impairments Found (OT Eval) ergonomics and body mechanics;gait, locomotion, and balance;joint integrity and mobility    Functional Limitations in Following Categories self-care    Rehab Potential/Prognosis: Occupational Therapy good, to achieve stated therapy goals    Problem List: Occupational Therapy decreased flexibility;impaired balance;pain    Activity Limitations Related to Problem List BADL activities not performed adequately or safely    Anticipated Equipment Needs at Discharge front wheeled walker;shower chair;dressing equipment     Expected Discharge Disposition home with assist;home with home health   home w/ assist vs home w/ home health    Daily Outcome Statement Improved performance w/ OT, increased independence w/ ADLs. NO further acute OT needs.                   Education provided this session. See the Patient Education summary report for full details.         OT Care Plan Goals      Most Recent Value   Bathing Goal   Time to Achieve Goal: Bathing  by discharge   Level of [place] Goal: Bathing  supervision required, set up required   Adaptive Equipment: Bathing Goal  tub bench   Bed Mobility Goal   Date Goal Established: Bed Mobility  08/07/18   Time to Achieve Goal: Bed Mobility  by discharge   Goal Activity: Bed Mobility  sit to supine/supine to sit   Level of [place] Goal: Bed Mobility  modified independence   Assistive Device: Bed Mobility  -- [std bed w/o rails vs couch]   Grooming Goal   Time to Achieve Goal: Grooming  by discharge   Level of [place] Goal: Grooming  modified independence   Position: Grooming  standing   Lower Body Dressing Goal   Time to Achieve Goal: Lower Body Dressing  by discharge   Level of [place]  supervision required, set up required   Adaptive Equipment: Lower Body Dressing  reacher, shoe horn, long handled, sock-aid   Toileting Goal   Time to Achieve Goal: Toileting  by discharge   Level of [place] Goal: Toileting  set up required, supervision required   Adaptive Equipment: Toileting  toilet seat, raised   Transfer Goal   Date Goal Established: Transfer Training  08/07/18   Time to Achieve Goal: Transfer Training  by discharge   Goal Activity: Transfer Training  sit to stand/stand to sit, bed to chair/chair to bed   Level of [place] Goal: Transfer Training  modified independence   Assistive Device: Transfer Training  walker, rolling

## 2018-08-08 NOTE — PLAN OF CARE
Problem: Patient Care Overview  Goal: Plan of Care Review  Outcome: Ongoing (interventions implemented as appropriate)   08/08/18 6549   Coping/Psychosocial   Plan Of Care Reviewed With patient   Plan of Care Review   Progress progress toward functional goals as expected   Outcome Summary presents w/ decr'd strength, R knee ROM, and pain merle; she is motivated to progress w/ continued PT to achieve maximal funct and safety, and established PT goals; cont stairs trng       Problem: Fall Risk (Adult)  Goal: Absence of Falls  Outcome: Ongoing (interventions implemented as appropriate)

## 2018-08-08 NOTE — PROGRESS NOTES
Daily Progress Note    Subjective    Interval History: Patient denies Fever/Chills,  N/V/D, SOB, Chest pain/Pressure, Palpitations, Cough/Wheezing, Abd Pain, Back Pain, Rash/Itch, Headache, Dizziness/Lightheadedness.  Patient is eating/drinking well and urinating okay.     Allergies - List reviewed  Family/Social Hx - No change    Objective    Vital signs in last 24 hours:  Temp:  [36.2 °C (97.1 °F)-37.2 °C (99 °F)] 36.6 °C (97.9 °F)  Heart Rate:  [64-76] 64  Resp:  [10-18] 14  BP: ()/() 122/64  FiO2 (%) (Set):  [40 %] 40 %      Intake/Output Summary (Last 24 hours) at 08/08/18 0948  Last data filed at 08/08/18 0600   Gross per 24 hour   Intake             1530 ml   Output               30 ml   Net             1500 ml         Physical Exam   Constitutional: Appears well-developed/well-nourished. No distress.   Head: Normocephalic and atraumatic.   Eyes: Conjunctivae wnl. EOMI. PERRLA   Neck: Normal range of motion. Neck supple.   Cardiovascular: RRR, nl heart sounds, intact distal pulses.    Pulmonary/Chest:  Effort normal/breath sounds CTA. No Rales/Rhonchi/Wheezing.  Abdominal: Soft. NT. Bowel sounds nl. No distension.   Genitourinary: No suprapubic tenderness or fullness  Musculoskeletal: rt knee with ace wrap intact and w/o obv d/c noted  Ext: No Cyanosis, Clubbing, Edema  Neurological: AAO x 3   Skin: Skin is warm and dry. No rash noted.    Psychiatric:  Normal affect/mood. Behavior wnl. Judgment/Thought nl.       Labs:   Lab Results   Component Value Date    WBC 10.09 08/08/2018    HGB 11.2 (L) 08/08/2018    HCT 35.6 08/08/2018    MCV 89.2 08/08/2018     08/08/2018     Lab Results   Component Value Date    GLUCOSE 163 (H) 08/08/2018    CALCIUM 8.2 (L) 08/08/2018     08/08/2018    K 4.3 08/08/2018    CO2 24 08/08/2018     08/08/2018    BUN 16 08/08/2018    CREATININE 1.0 08/08/2018     @RESUFAST(ALT,AST,GGT,ALKPHOS,BILI)    Imaging:  )X-ray Knee Right 1 Or 2 Views    Result Date:  8/7/2018  CLINICAL HISTORY:   Knee replacement COMPARISON:   None. COMMENT:   Two views of the right knee demonstrate a total knee prosthesis. Alignment is anatomic. There is no abnormal lucency around the metallic hardware. There is no fracture. Postsurgical changes are present in the soft tissues.     IMPRESSION: Right total knee prosthesis.         Assessment & Plan    Arthritis of knee  S/p rt tka, PT/OT eval and treat, IS 10x/day, am labs reviewed, con't iv abx per ortho protocol    Gastroesophageal reflux disease  con't prilosec 20mg po qday - sub protonix okay    Hypercholesterolemia  con't lipitor 10mg po qday    Essential hypertension  /65 this am, con't losartan 50mg po qday and prazosin 4mg po qhs    Depression  con't effexor xr 150mg po qday and seroquel 50mg po tid (decr from 150mg po tid for now), hold abilify post op at pt's request as she has not been taking this regularly.    Anxiety  con't seroquel at a reduced amt post op and con't effexor xr, pt does not wish to restart abilify.    Bipolar disorder, manic (CMS/HCC) ([facility])  con't seroquel and effexor, hold abilify at pt's request as she has not been taking this consistently at home    Schizophrenia (CMS/HCC) ([facility])  con't seroquel and hold ablify post op at pt's request    Overactive bladder  Restart ditropan once pt has consistently urinated on her own post operatively    Obstructive sleep apnea  Use bipap qhs  - pt's own cpap is being repaired.    Acute blood loss anemia  hct 35.6 this am and slightly decr 2nd acute surgical blood loss, f/u [facility] as an outpt.      VTE Assessment: I have reassessed and the patient's VTE risk and treatment plan is appropriate.    Expected Discharge Date:  8/9/2018

## 2018-08-08 NOTE — PLAN OF CARE
Problem: Patient Care Overview  Goal: Discharge Needs Assessment  Outcome: Ongoing (interventions implemented as appropriate)   08/07/18 2200 08/08/18 1500   DC Needs Assessment   Concerns Comments concern re: ADLs & getting up and down stairs --    Anticipated Discharge Disposition --  home with home health services;skilled nursing facility   Type of Home Care Services --  home PT   Community Agency Name(s) --  [facility]   Discharge Planning Comments --  met w/pt before 2nd gym. Pt aware of role in care and discussed home therapy program. [facility]. Pt reports she was uncertain of plan as someone may have mentioned that she may need SNF. [facility] explained what that was and pt stated she would not have that much support at home, but could possibly sleep on couch to avoid all of the stairs. Pt would still have one flight to shower. Discussed 2nd gym and will find out from therapy/ortho team if pt cleared for d/c home with home PT or if [facility] needs to return to discuss SNF   Activity/Self Care ROS   Equipment Currently Used at Home cane, straight;CPAP --    Discharge Planning   Patient/Family Concerns Related to Expected Discharge Disposition --  getting up stairs; being independent

## 2018-08-08 NOTE — PLAN OF CARE
Problem: Patient Care Overview  Goal: Plan of Care Review  Outcome: Ongoing (interventions implemented as appropriate)   08/08/18 0649   Coping/Psychosocial   Plan Of Care Reviewed With patient   Plan of Care Review   Progress progress toward functional goals as expected       Problem: Fall Risk (Adult)  Goal: Absence of Falls  Outcome: Ongoing (interventions implemented as appropriate)   08/08/18 0649   Fall Risk (Adult)   Absence of Falls making progress toward outcome

## 2018-08-08 NOTE — PROGRESS NOTES
8/8@16:15pm-per therapy and ortho team, pt cleared to d/c home with family and [facility] home therapy. Per Ortho NP, pt will need commode. Commode provided to pt from [facility]. Script and clinical faxed to [facility] for home therapy. Judith Ford. ELIZABETH Asencio

## 2018-08-08 NOTE — PROGRESS NOTES
Patient sitting in bed in NAD.  Sleepy postop but currently AAOx3, AVSS, Hgb 11.2  Denies CP/SOB, nausea or vomiting  Pain controlled with meds  RLE dressing CDI>removed  Cristian to right knee CDI with Mepilex to incision  RLE +DP, +DF/PF, sensation intact  Calves soft/nontender  A/P: POD#1 right TKA  Pain control>PDMP reviewed  PT/OT>WBAT  DVT proph>ASA BID  Continue incentive spirometer 10x/hour  DC to home today or tomorrow after PT/OT and medically cleared

## 2018-08-08 NOTE — ANESTHESIA POSTPROCEDURE EVALUATION
Patient: Kar Nicholas    Procedure Summary     Date:  08/07/18 Room / Location:   OR 6 / PH OR    Anesthesia Start:  0940 Anesthesia Stop:  1129    Procedure:  KNEE ARTHROPLASTY TOTAL (Right Knee) Diagnosis:  (Bilateral Knee Primary Osteoarthritis)    Surgeon:  Will Zamora MD Responsible Provider:  Brian Horne MD    Anesthesia Type:  spinal ASA Status:  3          Anesthesia Type: spinal  PACU Vitals  8/7/2018 1120 - 8/7/2018 1220      8/7/2018 1127 8/7/2018 1130 8/7/2018 1145 8/7/2018 1200    BP: (!)  85/46 (!)  85/46 126/63 121/65    Temp: 36.2 °C (97.1 °F) - - -    Pulse: 72 70 68 64    Resp: 15 16 12 12    SpO2: - 95 % 95 % 95 %              8/7/2018 1215             BP: 111/60       Temp: -       Pulse: 64       Resp: 13       SpO2: 95 %               Anesthesia Post Evaluation    Pain management: adequate  Patient location during evaluation: PACU  Patient participation: complete - patient participated  Level of consciousness: awake and alert  Cardiovascular status: acceptable  Airway Patency: adequate  Respiratory status: acceptable  Hydration status: acceptable  Anesthetic complications: no

## 2018-08-08 NOTE — PLAN OF CARE
Problem: Patient Care Overview  Goal: Plan of Care Review  Outcome: Ongoing (interventions implemented as appropriate)   08/08/18 1422   Coping/Psychosocial   Plan Of Care Reviewed With patient   Plan of Care Review   Progress progress toward functional goals as expected   Outcome Summary OT POC, scope, pt goals

## 2018-08-08 NOTE — PROGRESS NOTES
Patient: Kar Nicholas  Location: [facility] 4A 4003  MRN: [id number]  Today's date: 8/8/2018    Pt returned to room via chair by PMR tech.          Therapy Pain/Vitals - 08/08/18 1420        Pain/Comfort/Sleep    Presence of Pain complains of pain/discomfort    Preferred Pain Scale number (Numeric Rating Pain Scale)    Pain Body Location - Side Right    Pain Body Location knee    Pain Rating (0-10): Rest 7    Pain Rating (0-10): Activity 8    Pain Management Interventions position adjusted;relaxation promoted;prescribed exercises encouraged          Prior Living Environment  Lives With: child(vj), adult  Living Arrangements: house  Living Environment Comment: 3SH, 1st flr ND/laundry room, main living area, 2nd flr B/B-tub shower, 3rd flr pt's bedroom, 5STE w/L ascending rail on 3 steps only.  FF stairs w/R ascending rail.  Pt plan for 1st flr setup-sleep on couch. Equipment Currently Used at Home: cane, straight, CPAP       Prior Level of Function  Ambulation: assistive equipment  Transferring: assistive equipment  Toileting: independent  Bathing: independent  Dressing: independent  Eating: independent  Communication: understands/communicates without difficulty  Swallowing: swallows foods/liquids without difficulty  Equipment Currently Used at Home: cane, straight, CPAP  Prior Functional Level Comment: Difficulty transferring off couch b/c no AC on 3rd flr, Ind comm mob w/o AD, mod I ADLs, Pt-dishes laundry/dtr-shares IADLs. doesn't drive.  Pt currently on disability           PT Treatment Summary - 08/08/18 1420        Session Details    Document Type daily treatment    Mode of Treatment physical therapy    Patient/Family Observations PT gym       Time Calculation    Start Time 1347    Stop Time 1420    Time Calculation (min) 33 min       General Information    Patient Profile Reviewed? yes    Referring Physician Sera    Pertinent History of Current Functional Problem RTKA    Existing  Precautions/Restrictions fall;weight bearing       Weight Bearing Status    Right LE Weight Bearing Status weight bearing as tolerated       Transfers    Maintains Weight Bearing Status (Transfers) able to maintain weight bearing status       Sit to Stand Transfer    [place], Sit to Stand Transfer modified independence    Assistive Device walker, front-wheeled    Comment chair, arm rests       Stand to Sit Transfer    [place], Stand to Sit Transfer supervision;verbal cues    Verbal Cues preparatory posture    Assistive Device walker, front-wheeled    Comment chair, arm rests       Stand Pivot/Stand Step Transfer    [place], Stand Pivot/Stand Step Transfer supervision    Assistive Device walker, front-wheeled       Car Transfer    [place], Car Transfer minimum assist (75% patient effort)    Assistive Device walker, front-wheeled    Comment instr'd in tech, and positioning, and safety       Gait Analysis/Training    Gait/Stairs Locomotion Gait Training (Group);Stairs Training (Group)       Gait Training    [place], Gait supervision    Assistive Device walker, front-wheeled    Distance in Feet 144 feet    Gait Pattern Utilized step-through    Gait Deviations Identified right;antalgic;wide base of support;decreased [place];decreased gait speed;decreased heel strike    Maintains Weight Bearing Status able to maintain weight bearing status    Comment cue for refinement of gait and cont reciprocal pattern       Stairs Training    [place], Stairs minimum assist (75% patient effort)    Assistive Device cane, straight;railing    Handrail Location left side (ascending);right side (descending);other (see comments)   see comments    Number of Stairs 5    Stair Height 6 inches    Ascending Stairs Technique step-to-step    Descending Stairs Technique step-to-step    Comment cue for foot sequencing, coordinating cane and rail for optimal sppt and sequencing of use       AM-PAC (TM) - Mobility     Turning from your back to your side while in a flat bed without using bedrails? 4 - None    Moving from lying on your back to sitting on the side of a flat bed without using bedrails? 4 - None    Moving to and from a bed to a chair? 4 - None    Standing up from a chair using your arms? 4 - None    To walk in a hospital room? 4 - None    Climbing 3-5 steps with a railing? 2 - A Lot    AM-PAC (TM) Mobility Score 22       PT Clinical Impression    Plan For Care Reviewed: Physical Therapy patient voices agreement with PT plan for care    Impairments Found (PT Eval) aerobic capacity/endurance;arousal, attention, and cognition;ergonomics and body mechanics;gait, locomotion, and balance;integumentary integrity;joint integrity and mobility;motor function;muscle performance;posture;ROM (range of motion)    Functional Limitations in Following Categories (PT Eval) home management;self-care    Rehab Potential/Prognosis good, to achieve stated therapy goals    PT Frequency of Treatment 5-7 times per week    Problem List decreased ROM;decreased strength;pain    Anticipated Equipment Needs at Discharge other (see comments)   RW issued    Expected Discharge Disposition home with home health    Daily Outcome Statement pt demonstates ability to access home via stairs but presents w/ limited strength, endurance, and pain tolerance to negotiat full flight of stairs at this time; she is motivated to progress but will require continued PT to maximize funct outcomes and safety and achieve established PT goals.                        Education provided this session. See the Patient Education summary report for full details.    PT Care Plan Goals      Most Recent Value   Bed Mobility Goal   Date Goal Established: Bed Mobility  08/07/18   Time to Achieve Goal: Bed Mobility  by discharge   Goal Activity: Bed Mobility  sit to supine/supine to sit   Level of [place] Goal: Bed Mobility  modified independence   Assistive Device: Bed Mobility   -- [std bed w/o rails vs couch]   Gait Goal   Date Goal Established: Gait Training  08/07/18   Time to Achieve Goal: Gait Training  by discharge   Level of [place]  modified independence   Assistive Device: Gait Training  walker, rolling   Distance Goal: Gait Training (feet)  200 feet   Transfer Goal   Date Goal Established: Transfer Training  08/07/18   Time to Achieve Goal: Transfer Training  by discharge   Goal Activity: Transfer Training  sit to stand/stand to sit, bed to chair/chair to bed   Level of [place] Goal: Transfer Training  modified independence   Assistive Device: Transfer Training  walker, rolling

## 2018-08-09 ENCOUNTER — APPOINTMENT (INPATIENT)
Dept: PHYSICAL THERAPY | Facility: HOSPITAL | Age: 53
DRG: 470 | End: 2018-08-09
Payer: MEDICARE

## 2018-08-09 VITALS
BODY MASS INDEX: 44.41 KG/M2 | HEART RATE: 75 BPM | OXYGEN SATURATION: 96 % | HEIGHT: 68 IN | TEMPERATURE: 98.9 F | SYSTOLIC BLOOD PRESSURE: 172 MMHG | RESPIRATION RATE: 18 BRPM | DIASTOLIC BLOOD PRESSURE: 92 MMHG | WEIGHT: 293 LBS

## 2018-08-09 PROCEDURE — 63700000 HC SELF-ADMINISTRABLE DRUG: Performed by: NURSE PRACTITIONER

## 2018-08-09 PROCEDURE — 63700000 HC SELF-ADMINISTRABLE DRUG: Performed by: ORTHOPAEDIC SURGERY

## 2018-08-09 PROCEDURE — 63700000 HC SELF-ADMINISTRABLE DRUG: Performed by: INTERNAL MEDICINE

## 2018-08-09 PROCEDURE — 97116 GAIT TRAINING THERAPY: CPT | Mod: GP

## 2018-08-09 PROCEDURE — 63600000 HC DRUGS/DETAIL CODE: Performed by: NURSE PRACTITIONER

## 2018-08-09 PROCEDURE — 97110 THERAPEUTIC EXERCISES: CPT | Mod: GP

## 2018-08-09 RX ORDER — MELOXICAM 7.5 MG/1
7.5 TABLET ORAL DAILY
Status: DISCONTINUED | OUTPATIENT
Start: 2018-08-09 | End: 2018-08-09 | Stop reason: HOSPADM

## 2018-08-09 RX ORDER — QUETIAPINE FUMARATE 100 MG/1
100 TABLET, FILM COATED ORAL NIGHTLY
Qty: 90 TABLET | Refills: 0 | COMMUNITY
Start: 2018-08-09 | End: 2018-10-11

## 2018-08-09 RX ADMIN — VENLAFAXINE HYDROCHLORIDE 150 MG: 150 CAPSULE, EXTENDED RELEASE ORAL at 08:01

## 2018-08-09 RX ADMIN — OXYCODONE HYDROCHLORIDE 10 MG: 5 TABLET ORAL at 06:44

## 2018-08-09 RX ADMIN — KETOROLAC TROMETHAMINE 15 MG: 15 INJECTION, SOLUTION INTRAMUSCULAR; INTRAVENOUS at 06:44

## 2018-08-09 RX ADMIN — LOSARTAN POTASSIUM 50 MG: 50 TABLET, FILM COATED ORAL at 08:00

## 2018-08-09 RX ADMIN — OXYCODONE HYDROCHLORIDE 10 MG: 5 TABLET ORAL at 09:24

## 2018-08-09 RX ADMIN — QUETIAPINE FUMARATE 50 MG: 25 TABLET ORAL at 08:01

## 2018-08-09 RX ADMIN — KETOROLAC TROMETHAMINE 15 MG: 15 INJECTION, SOLUTION INTRAMUSCULAR; INTRAVENOUS at 00:04

## 2018-08-09 RX ADMIN — OXYCODONE HYDROCHLORIDE 10 MG: 5 TABLET ORAL at 12:26

## 2018-08-09 RX ADMIN — ACETAMINOPHEN 650 MG: 325 TABLET, FILM COATED ORAL at 08:01

## 2018-08-09 RX ADMIN — OXYCODONE HYDROCHLORIDE 10 MG: 5 TABLET ORAL at 00:05

## 2018-08-09 RX ADMIN — PRAZOSIN HYDROCHLORIDE 4 MG: 1 CAPSULE ORAL at 00:17

## 2018-08-09 RX ADMIN — MELOXICAM 7.5 MG: 7.5 TABLET ORAL at 11:07

## 2018-08-09 RX ADMIN — ASPIRIN 81 MG 81 MG: 81 TABLET ORAL at 08:00

## 2018-08-09 RX ADMIN — ATORVASTATIN CALCIUM 10 MG: 10 TABLET, FILM COATED ORAL at 08:00

## 2018-08-09 RX ADMIN — SENNOSIDES AND DOCUSATE SODIUM 1 TABLET: 8.6; 5 TABLET ORAL at 08:00

## 2018-08-09 RX ADMIN — QUETIAPINE FUMARATE 100 MG: 100 TABLET ORAL at 00:05

## 2018-08-09 RX ADMIN — PANTOPRAZOLE SODIUM 40 MG: 40 TABLET, DELAYED RELEASE ORAL at 08:01

## 2018-08-09 ASSESSMENT — COGNITIVE AND FUNCTIONAL STATUS - GENERAL
CLIMB 3 TO 5 STEPS WITH RAILING: 3 - A LITTLE
STANDING UP FROM CHAIR USING ARMS: 4 - NONE
WALKING IN HOSPITAL ROOM: 4 - NONE
MOVING TO AND FROM BED TO CHAIR: 4 - NONE

## 2018-08-09 NOTE — PLAN OF CARE
Problem: Patient Care Overview  Goal: Plan of Care Review  Outcome: Ongoing (interventions implemented as appropriate)   08/09/18 1331   Coping/Psychosocial   Plan Of Care Reviewed With patient   Plan of Care Review   Progress progress toward functional goals as expected   Outcome Summary pt requires asst w/ stairs; progressing w/ established PT goals; pain mgmt       Problem: Fall Risk (Adult)  Goal: Absence of Falls  Outcome: Ongoing (interventions implemented as appropriate)

## 2018-08-09 NOTE — PROGRESS NOTES
Patient sitting in bed in NAD.  AAOx3, AVSS, Hgb 11.2 8/8  Denies CP/SOB, nausea or vomiting  Pain controlled with meds  Staples to right knee CDI with Mepilex to incision  RLE +DP, +DF/PF, sensation intact  Calves soft/nontender  A/P: POD#2 right TKA  Pain control>PDMP reviewed  PT/OT>WBAT  DVT proph>ASA BID  Continue incentive spirometer 10x/hour  DC to home today after PT clears>patient's bedroom on 3rd floor but daughter to have first floor set up for patient

## 2018-08-09 NOTE — PROGRESS NOTES
Patient: Kar Nicholas  Location: [facility] 4A 4003  MRN: [id number]  Today's date: 8/9/2018     Pt returned to room via chair transport by PMR tech.          Therapy Pain/Vitals - 08/09/18 1015        Pain/Comfort/Sleep    Presence of Pain complains of pain/discomfort    Preferred Pain Scale number (Numeric Rating Pain Scale)    Pain Body Location - Side Right    Pain Body Location calf    Pain Rating (0-10): Rest 9    Pain Rating (0-10): Activity 7    Pain Management Interventions relaxation promoted;prescribed exercises encouraged;relaxation techniques promoted          Prior Living Environment  Lives With: child(vj), adult  Living Arrangements: house  Living Environment Comment: 3SH, 1st flr WI/laundry room, main living area, 2nd flr B/B-tub shower, 3rd flr pt's bedroom, 5STE w/L ascending rail on 3 steps only.  FF stairs w/R ascending rail.  Pt plan for 1st flr setup-sleep on couch. Equipment Currently Used at Home: cane, straight, CPAP       Prior Level of Function  Ambulation: assistive equipment  Transferring: assistive equipment  Toileting: independent  Bathing: independent  Dressing: independent  Eating: independent  Communication: understands/communicates without difficulty  Swallowing: swallows foods/liquids without difficulty  Equipment Currently Used at Home: cane, straight, CPAP  Prior Functional Level Comment: Difficulty transferring off couch b/c no AC on 3rd flr, Ind comm mob w/o AD, mod I ADLs, Pt-dishes laundry/dtr-shares IADLs. doesn't drive.  Pt currently on disability           PT Treatment Summary - 08/09/18 1015        Time Calculation    Start Time 0948    Stop Time 1015    Time Calculation (min) 27 min       General Information    Patient Profile Reviewed? yes    Referring Physician Sera    Pertinent History of Current Functional Problem RTYKA    Existing Precautions/Restrictions fall;weight bearing       Weight Bearing Status    Right LE Weight Bearing Status weight bearing  as tolerated       Range of Motion (ROM)    General Range of Motion no range of motion deficits identified    Comment, General Range of Motion RTKA AAROM: 0-65       Sit to Stand Transfer    [place], Sit to Stand Transfer modified independence    Assistive Device walker, front-wheeled    Comment chair, arm rests       Stand to Sit Transfer    [place], Stand to Sit Transfer modified independence    Assistive Device walker, front-wheeled    Comment chair, arm rests       Stand Pivot/Stand Step Transfer    [place], Stand Pivot/Stand Step Transfer supervision    Assistive Device walker, front-wheeled       Car Transfer    [place], Car Transfer minimum assist (75% patient effort)    Assistive Device walker, front-wheeled    Comment Min A for foot clearance into/out of car       Gait Analysis/Training    Gait/Stairs Locomotion Gait Training (Group);Stairs Training (Group)       Gait Training    [place], Gait supervision    Assistive Device walker, front-wheeled    Distance in Feet 163 feet    Gait Pattern Utilized step-through    Gait Deviations Identified right;antalgic;wide base of support;decreased heel strike;decreased weight shifting;decreased gait speed    Maintains Weight Bearing Status able to maintain weight bearing status    Comment cue for refinement of posture and gait; improves w/ incr'd time and distance walked; fatigues       Stairs Training    [place], Stairs minimum assist (75% patient effort);verbal cues    Assistive Device cane, straight    Handrail Location left side (descending);right side (descending)    Number of Stairs 10    Stair Height 6 inches    Ascending Stairs Technique step-to-step    Descending Stairs Technique step-to-step    Comment requires cueing for optimal use of cane and sequencing errors; pt will require asst for stairs       AM-PAC (TM) - Mobility    Turning from your back to your side while in a flat bed without using bedrails? 4 - None    Moving from  lying on your back to sitting on the side of a flat bed without using bedrails? 4 - None    Moving to and from a bed to a chair? 4 - None    Standing up from a chair using your arms? 4 - None    To walk in a hospital room? 4 - None    Climbing 3-5 steps with a railing? 3 - A Little    AM-PAC (TM) Mobility Score 23       PT Clinical Impression    Plan For Care Reviewed: Physical Therapy patient voices agreement with PT plan for care    Impairments Found (PT Eval) aerobic capacity/endurance;arousal, attention, and cognition;ergonomics and body mechanics;gait, locomotion, and balance;integumentary integrity;joint integrity and mobility;motor function;muscle performance;posture;ROM (range of motion)    Functional Limitations in Following Categories (PT Eval) self-care;home management    Rehab Potential/Prognosis good, to achieve stated therapy goals    PT Frequency of Treatment 5-7 times per week    Problem List decreased ROM;decreased strength;pain    Anticipated Equipment Needs at Discharge none    Expected Discharge Disposition home with home health;home with assist    Daily Outcome Statement impaired but improving mobility; pt will require asst for stairs; will have first floor set up and cont w/ home PT                        Education provided this session. See the Patient Education summary report for full details.    PT Care Plan Goals      Most Recent Value   Bed Mobility Goal   Date Goal Established: Bed Mobility  08/07/18   Time to Achieve Goal: Bed Mobility  by discharge   Goal Activity: Bed Mobility  sit to supine/supine to sit   Level of [place] Goal: Bed Mobility  modified independence   Assistive Device: Bed Mobility  -- [std bed w/o rails vs couch]   Gait Goal   Date Goal Established: Gait Training  08/07/18   Time to Achieve Goal: Gait Training  by discharge   Level of [place]  modified independence   Assistive Device: Gait Training  walker, rolling   Distance Goal: Gait Training (feet)  200  feet   Transfer Goal   Date Goal Established: Transfer Training  08/07/18   Time to Achieve Goal: Transfer Training  by discharge   Goal Activity: Transfer Training  sit to stand/stand to sit, bed to chair/chair to bed   Level of [place] Goal: Transfer Training  modified independence   Assistive Device: Transfer Training  walker, rolling

## 2018-08-09 NOTE — NURSING NOTE
Discharge instructions given. Patient verbalize understanding. Prescriptions given. Iv d/c. Waiting for ride to come around 2pm

## 2018-08-09 NOTE — PROGRESS NOTES
Daily Progress Note    Subjective    Interval History: Patient denies Fever/Chills,  N/V/D, SOB, Chest pain/Pressure, Palpitations, Cough/Wheezing, Abd Pain, Back Pain, Rash/Itch, Headache, Dizziness/Lightheadedness.  Patient is eating/drinking well and urinating okay.     Allergies - List reviewed  Family/Social Hx - No change    Objective    Vital signs in last 24 hours:  Temp:  [36.6 °C (97.9 °F)-37.1 °C (98.7 °F)] 36.6 °C (97.9 °F)  Heart Rate:  [66-91] 66  Resp:  [18] 18  BP: (136-174)/(72-94) 144/78      Intake/Output Summary (Last 24 hours) at 08/09/18 0916  Last data filed at 08/08/18 1110   Gross per 24 hour   Intake              480 ml   Output              600 ml   Net             -120 ml         Physical Exam   Constitutional: Appears well-developed/well-nourished. No distress.   Head: Normocephalic and atraumatic.   Eyes: Conjunctivae wnl. EOMI. PERRLA   Neck: Normal range of motion. Neck supple.   Cardiovascular: RRR, nl heart sounds, intact distal pulses.    Pulmonary/Chest:  Effort normal/breath sounds CTA. No Rales/Rhonchi/Wheezing.  Abdominal: Soft. NT. Bowel sounds nl. No distension.   Genitourinary: No suprapubic tenderness or fullness  Musculoskeletal: rt knee with mepilex c/d/i  Ext: No Cyanosis, Clubbing, Edema  Neurological: AAO x 3   Skin: Skin is warm and dry. No rash noted.    Psychiatric:  Normal affect/mood. Behavior wnl. Judgment/Thought nl.       Labs:   Lab Results   Component Value Date    WBC 10.09 08/08/2018    HGB 11.2 (L) 08/08/2018    HCT 35.6 08/08/2018    MCV 89.2 08/08/2018     08/08/2018     Lab Results   Component Value Date    GLUCOSE 163 (H) 08/08/2018    CALCIUM 8.2 (L) 08/08/2018     08/08/2018    K 4.3 08/08/2018    CO2 24 08/08/2018     08/08/2018    BUN 16 08/08/2018    CREATININE 1.0 08/08/2018     @RESUFAST(ALT,AST,GGT,ALKPHOS,BILI)    Imaging:  )No results found.      Assessment & Plan    Arthritis of knee  S/p rt tka, PT/OT eval and treat, IS  10x/day, am labs reviewed, con't iv abx per ortho protocol    Gastroesophageal reflux disease  con't prilosec 20mg po qday - sub protonix okay    Hypercholesterolemia  con't lipitor 10mg po qday    Essential hypertension  /78 this am, con't losartan 50mg po qday and prazosin 4mg po qhs    Depression  con't effexor xr 150mg po qday and seroquel 50mg po tid - can incr to 150mg po tid now, hold abilify post op at pt's request as she has not been taking this regularly.    Anxiety  con't seroquel at a reduced amt post op and con't effexor xr, pt does not wish to restart abilify.    Bipolar disorder, manic (CMS/[facility]) ([facility])  con't seroquel and effexor, hold abilify at pt's request as she has not been taking this consistently at home    Schizophrenia (CMS/[facility]) ([facility])  con't seroquel and hold ablify post op at pt's request    Overactive bladder  Restart ditropan once pt has consistently urinated on her own post operatively    Obstructive sleep apnea  Use bipap qhs  - pt's own cpap is being repaired.    Acute blood loss anemia  hct 35.6 this am and slightly decr 2nd acute surgical blood loss, f/u [facility] as an outpt.      VTE Assessment: I have reassessed and the patient's VTE risk and treatment plan is appropriate.    Expected Discharge Date:  8/9/2018

## 2018-08-09 NOTE — PROGRESS NOTES
8/9@08:40am-met w/pt to f/u on DME and d/c planning. Pt going to [facility] today and reports family coming to transport around 3pm. Referral already made to [facility] home PT. Noted pt's weight and needed to change order for commode to Bariatric. Asking [facility] to deliver to pt's room by 3pm. Pt aware and agreeable to plan. ELIZABETH Patel   8/9@14:00pm-received email earlier from [facility] indicating they are trying to get a hold of patient as they can not deliver commode without getting 20% co pay up front. They were unable to get a hold of pt and therefore, no movement for delivery occurred. MSW CC met with pt and discussed cost and informed pt they need credit card payment today and pt states she does not have that money today until she gets her check at the end of the month. She will cover it then. MSW CC called Riaz from [facility] who explained they usually require this, but will investigate options. CCS Manager updated as well. Riaz called back and reports his manager will make an exception at this time and they will bill patient. Riaz to see status of delivery of commode and try to push it through. ELIZABETH Patel   8/9@15:00pm-Riaz called and stated they would deliver commode to hospital room by 4:30pm. MSW CC went to inform pt and noted pt already d/c'd home. Tried to get calls out to Riaz to alert, just received vmail on first ring. LM awtg return call. ELIZABETH Patel  8/9@15:50pm-tried Riaz again, no answer, no call back. Trying to call Main number to [facility]. Need equipment delivery changed to pt's home address. Mariaa answered and she was the one involved who approved bill to be sent. Updated Mariaa on pt's dispo and pt left already. Mariaa trying to get a hold of  to re-route to [facility] for delivery. Mariaa placed MSW on hold and then returned stating they will deliver to pt's home. ELIZABETH Patel

## 2018-08-10 ENCOUNTER — PATIENT OUTREACH (OUTPATIENT)
Dept: CASE MANAGEMENT | Facility: CLINIC | Age: 53
End: 2018-08-10

## 2018-08-13 NOTE — PROGRESS NOTES
NAME: Kar Nicholas    MRN: [id number]    YOB: 1965    EVENT DETAILS    Discharging Facility: [facility]  Date of Admission: 08/07/18  Date of Discharge: 08/07/18  Discharge Instructions Reviewed?: Yes         Reason for Admission:  Osteoarthritis       HPI: Patient stated that she is doing fair. She had to return to the E D at [facility] yesterday because her knee was swollen. She was advised to continue with ice PRN and keep her leg elevated 6 hours every day.    Patient denies signs or symptoms of infection at her incision.    Patient is managing  post op[ pain with medications.    Patient stated that she is remaining upstairs. She is able to sleep on a chair / sofa. Patient is able to ambulate with her walker.    Patient is not reporting abnormal cardiac, respiratory, G I or  symptoms.    Patient stated that she has not yet heard from home care. Made call to [facility]. They have been trying to reach the patient since last week. Several numbers provided to them were non working numbers. Gave contact number that I was able to reach the patient today. Reminded patient to make sure that her mailbox was empty.        MEDICATION REVIEW:    Reported by:: Patient  Prescriptions Filled?: Yes     Was a medication discrepancy indentified?: No     Medication understanding?: Yes     Medication Adherence?: Yes     Any side effects from medication?: No       Medication review Reviewed, see medication history    Additional Comments: Patient stated that the pharmacy fills her pillbox for her.      FOLLOW-UP TESTS/PROCEDURES: N/A        HOME MANAGEMENT    Living Arrangement: Children (Lives with daughter)       HOME CARE SERVICES    Receiving Home Care Services: Yes  Type of Home Care Services: Home PT  Home Care Agency: [facility]        DURABLE MEDICAL EQUIPMENT    Durable Medical Equipment: Walker, Bedside commode  Oxygen Use: No            BARRIERS TO CARE    SELF-CARE    Living  Arrangement: Children (Lives with daughter)       SOCIOECONOMIC    Financial Problems?: No     Transportation Issues?: No            INTERVENTION/CARE COORDINATION    Interventions/ Care Coordination: Encouraged patient to schedule with the PCP/Specialist     PCP appointment: Encouraged to schedule  Surgeon appointment : 8/17/2018    PLAN OF CARE:    Reviewed signs/symptoms of worsening condition or complication that necessitate a call to the Physician's office.  Educated patient on access to care.  RN phone number given for future care management needs.       Amelia Hernandez RN

## 2018-08-20 ENCOUNTER — TELEPHONE (OUTPATIENT)
Dept: FAMILY MEDICINE | Facility: CLINIC | Age: 53
End: 2018-08-20

## 2018-08-20 RX ORDER — FLUTICASONE PROPIONATE 50 MCG
1 SPRAY, SUSPENSION (ML) NASAL 2 TIMES DAILY
Qty: 1 BOTTLE | Refills: 0 | Status: SHIPPED | OUTPATIENT
Start: 2018-08-20 | End: 2019-01-21

## 2018-08-20 NOTE — TELEPHONE ENCOUNTER
Hx of hypertension. Please advise patient I have sent in a prescription of flonase to be used one spray per nostril twice daily. If symptoms persists - please call us back

## 2018-08-23 ENCOUNTER — PATIENT OUTREACH (OUTPATIENT)
Dept: CASE MANAGEMENT | Facility: CLINIC | Age: 53
End: 2018-08-23

## 2018-08-28 ENCOUNTER — TELEPHONE (OUTPATIENT)
Dept: FAMILY MEDICINE | Facility: CLINIC | Age: 53
End: 2018-08-28

## 2018-08-28 DIAGNOSIS — I10 ESSENTIAL HYPERTENSION: ICD-10-CM

## 2018-08-28 DIAGNOSIS — K21.9 GASTROESOPHAGEAL REFLUX DISEASE, ESOPHAGITIS PRESENCE NOT SPECIFIED: ICD-10-CM

## 2018-08-28 DIAGNOSIS — N32.81 OVERACTIVE BLADDER: ICD-10-CM

## 2018-08-28 DIAGNOSIS — E78.00 HYPERCHOLESTEROLEMIA: ICD-10-CM

## 2018-08-28 RX ORDER — OMEPRAZOLE 20 MG/1
20 CAPSULE, DELAYED RELEASE ORAL
Qty: 30 CAPSULE | Refills: 5 | Status: SHIPPED | OUTPATIENT
Start: 2018-08-28 | End: 2018-10-11

## 2018-08-28 RX ORDER — OXYBUTYNIN CHLORIDE 5 MG/1
5 TABLET ORAL 2 TIMES DAILY
Qty: 60 TABLET | Refills: 5 | Status: SHIPPED | OUTPATIENT
Start: 2018-08-28 | End: 2018-10-11

## 2018-08-28 RX ORDER — LOSARTAN POTASSIUM 50 MG/1
50 TABLET ORAL EVERY MORNING
Qty: 30 TABLET | Refills: 5 | Status: SHIPPED | OUTPATIENT
Start: 2018-08-28 | End: 2018-10-11

## 2018-08-28 RX ORDER — ATORVASTATIN CALCIUM 10 MG/1
10 TABLET, FILM COATED ORAL EVERY MORNING
Qty: 30 TABLET | Refills: 5 | Status: SHIPPED | OUTPATIENT
Start: 2018-08-28 | End: 2018-10-11

## 2018-08-28 NOTE — TELEPHONE ENCOUNTER
Atorvastatin, losartan, omeprazole and oxybutynin were all refilled - unsure if she needs anything else.

## 2018-08-28 NOTE — TELEPHONE ENCOUNTER
Pt called in requesting a refill for her non-psych medications.  I advised Pauly I need specific medications that need to be refilled.  Called number in chart but unable to leave message due to it having restrictions that do not allow for a message.

## 2018-09-06 NOTE — PROGRESS NOTES
No return call after messages left. Will close to care management. Has RN contact information for future reference.

## 2018-09-27 ENCOUNTER — TELEPHONE (OUTPATIENT)
Dept: FAMILY MEDICINE | Facility: CLINIC | Age: 53
End: 2018-09-27

## 2018-09-27 NOTE — TELEPHONE ENCOUNTER
LM for pt   We recv forms for lumbar sacral orthosis  And hinged knee brace.    Asked pt to return my call, she just saw Dr Grullon for preop for knee replacement, feel see needs to provider her ortho/sx this form to see if they would like her to use this post surgical.    Also we do not have any documentation of her back issues she would need to have the send this to her specialist she sees for this condition.

## 2018-10-11 ENCOUNTER — OFFICE VISIT (OUTPATIENT)
Dept: FAMILY MEDICINE | Facility: CLINIC | Age: 53
End: 2018-10-11
Payer: MEDICARE

## 2018-10-11 ENCOUNTER — APPOINTMENT (OUTPATIENT)
Dept: LAB | Age: 53
End: 2018-10-11
Attending: FAMILY MEDICINE
Payer: MEDICARE

## 2018-10-11 VITALS
HEART RATE: 83 BPM | TEMPERATURE: 98.3 F | WEIGHT: 293 LBS | SYSTOLIC BLOOD PRESSURE: 110 MMHG | OXYGEN SATURATION: 97 % | HEIGHT: 68 IN | BODY MASS INDEX: 44.41 KG/M2 | DIASTOLIC BLOOD PRESSURE: 76 MMHG

## 2018-10-11 DIAGNOSIS — F43.10 PTSD (POST-TRAUMATIC STRESS DISORDER): ICD-10-CM

## 2018-10-11 DIAGNOSIS — K21.9 GASTROESOPHAGEAL REFLUX DISEASE, ESOPHAGITIS PRESENCE NOT SPECIFIED: ICD-10-CM

## 2018-10-11 DIAGNOSIS — R10.9 BILATERAL FLANK PAIN: ICD-10-CM

## 2018-10-11 DIAGNOSIS — F41.9 ANXIETY: ICD-10-CM

## 2018-10-11 DIAGNOSIS — I10 ESSENTIAL HYPERTENSION: Primary | ICD-10-CM

## 2018-10-11 DIAGNOSIS — E78.00 HYPERCHOLESTEROLEMIA: ICD-10-CM

## 2018-10-11 DIAGNOSIS — F31.10: ICD-10-CM

## 2018-10-11 DIAGNOSIS — I10 ESSENTIAL HYPERTENSION: ICD-10-CM

## 2018-10-11 DIAGNOSIS — N32.81 OVERACTIVE BLADDER: ICD-10-CM

## 2018-10-11 DIAGNOSIS — F20.9 SCHIZOPHRENIA, UNSPECIFIED TYPE: ICD-10-CM

## 2018-10-11 LAB
ALBUMIN SERPL-MCNC: 3.7 G/DL (ref 3.4–5)
ALP SERPL-CCNC: 262 IU/L (ref 35–126)
ALT SERPL-CCNC: 238 IU/L (ref 11–54)
ANION GAP SERPL CALC-SCNC: 9 MEQ/L (ref 3–15)
AST SERPL-CCNC: 95 IU/L (ref 15–41)
BASOPHILS # BLD: 0.05 K/UL (ref 0.01–0.1)
BASOPHILS NFR BLD: 0.9 %
BILIRUB SERPL-MCNC: 0.6 MG/DL (ref 0.3–1.2)
BILIRUBIN, POC: NEGATIVE
BLOOD URINE, POC: NEGATIVE
BUN SERPL-MCNC: 8 MG/DL (ref 8–20)
CALCIUM SERPL-MCNC: 9.6 MG/DL (ref 8.9–10.3)
CHLORIDE SERPL-SCNC: 110 MEQ/L (ref 98–109)
CLARITY, POC: NORMAL
CO2 SERPL-SCNC: 25 MEQ/L (ref 22–32)
COLOR, POC: YELLOW
CREAT SERPL-MCNC: 0.9 MG/DL (ref 0.6–1.1)
DIFFERENTIAL METHOD BLD: NORMAL
EOSINOPHIL # BLD: 0.18 K/UL (ref 0.04–0.36)
EOSINOPHIL NFR BLD: 3.1 %
ERYTHROCYTE [DISTWIDTH] IN BLOOD BY AUTOMATED COUNT: 14.3 % (ref 11.7–14.4)
GFR SERPL CREATININE-BSD FRML MDRD: >60 ML/MIN/1.73M*2
GLUCOSE SERPL-MCNC: 91 MG/DL (ref 70–99)
GLUCOSE URINE, POC: NEGATIVE
HCT VFR BLDCO AUTO: 38.4 % (ref 35–45)
HGB BLD-MCNC: 11.8 G/DL (ref 11.8–15.7)
IMM GRANULOCYTES # BLD AUTO: 0.02 K/UL (ref 0–0.08)
IMM GRANULOCYTES NFR BLD AUTO: 0.3 %
KETONES, POC: NEGATIVE
LEUKOCYTE EST, POC: NEGATIVE
LYMPHOCYTES # BLD: 1.5 K/UL (ref 1.2–3.5)
LYMPHOCYTES NFR BLD: 26.2 %
MCH RBC QN AUTO: 26.8 PG (ref 28–33.2)
MCHC RBC AUTO-ENTMCNC: 30.7 G/DL (ref 32.2–35.5)
MCV RBC AUTO: 87.3 FL (ref 83–98)
MONOCYTES # BLD: 0.52 K/UL (ref 0.28–0.8)
MONOCYTES NFR BLD: 9.1 %
NEUTROPHILS # BLD: 3.46 K/UL (ref 1.7–7)
NEUTS SEG NFR BLD: 60.4 %
NRBC BLD-RTO: 0 %
PDW BLD AUTO: 11.8 FL (ref 9.4–12.3)
PH, POC: 5
PLATELET # BLD AUTO: 291 K/UL (ref 150–369)
POTASSIUM SERPL-SCNC: 4.1 MEQ/L (ref 3.6–5.1)
PROT SERPL-MCNC: 6.3 G/DL (ref 6–8.2)
PROTEIN, POC: NORMAL
RBC # BLD AUTO: 4.4 M/UL (ref 3.93–5.22)
SODIUM SERPL-SCNC: 144 MEQ/L (ref 136–144)
SPECIFIC GRAVITY, POC: 1.02
UROBILINOGEN, POC: 0.2
WBC # BLD AUTO: 5.73 K/UL (ref 3.8–10.5)

## 2018-10-11 PROCEDURE — 81002 URINALYSIS NONAUTO W/O SCOPE: CPT | Performed by: FAMILY MEDICINE

## 2018-10-11 PROCEDURE — 87086 URINE CULTURE/COLONY COUNT: CPT | Performed by: FAMILY MEDICINE

## 2018-10-11 PROCEDURE — 99214 OFFICE O/P EST MOD 30 MIN: CPT | Performed by: FAMILY MEDICINE

## 2018-10-11 PROCEDURE — 80053 COMPREHEN METABOLIC PANEL: CPT

## 2018-10-11 PROCEDURE — 36415 COLL VENOUS BLD VENIPUNCTURE: CPT

## 2018-10-11 PROCEDURE — 85025 COMPLETE CBC W/AUTO DIFF WBC: CPT

## 2018-10-11 RX ORDER — OMEPRAZOLE 20 MG/1
20 CAPSULE, DELAYED RELEASE ORAL
Qty: 30 CAPSULE | Refills: 5 | COMMUNITY
Start: 2018-10-11 | End: 2019-01-21

## 2018-10-11 RX ORDER — ATORVASTATIN CALCIUM 10 MG/1
10 TABLET, FILM COATED ORAL EVERY MORNING
Qty: 30 TABLET | Refills: 5 | COMMUNITY
Start: 2018-10-11 | End: 2019-01-21

## 2018-10-11 RX ORDER — QUETIAPINE FUMARATE 100 MG/1
100 TABLET, FILM COATED ORAL 2 TIMES DAILY
Qty: 90 TABLET | Refills: 0 | COMMUNITY
Start: 2018-10-11 | End: 2019-04-15

## 2018-10-11 RX ORDER — LOSARTAN POTASSIUM 50 MG/1
50 TABLET ORAL EVERY MORNING
Qty: 30 TABLET | Refills: 5 | COMMUNITY
Start: 2018-10-11 | End: 2018-11-16

## 2018-10-11 RX ORDER — VENLAFAXINE HYDROCHLORIDE 150 MG/1
150 CAPSULE, EXTENDED RELEASE ORAL DAILY
Qty: 30 CAPSULE | Refills: 0
Start: 2018-10-11 | End: 2019-08-19

## 2018-10-11 RX ORDER — ARIPIPRAZOLE 30 MG/1
30 TABLET ORAL DAILY
Start: 2018-10-11 | End: 2019-04-15

## 2018-10-11 RX ORDER — MIRABEGRON 25 MG/1
25 TABLET, FILM COATED, EXTENDED RELEASE ORAL DAILY
Qty: 30 TABLET | Refills: 5 | Status: SHIPPED | OUTPATIENT
Start: 2018-10-11 | End: 2019-01-21

## 2018-10-11 ASSESSMENT — ENCOUNTER SYMPTOMS
FEVER: 0
TROUBLE SWALLOWING: 0
HEADACHES: 0
FATIGUE: 0
WEAKNESS: 0
BLOOD IN STOOL: 0
DIARRHEA: 0
LIGHT-HEADEDNESS: 0
CONSTIPATION: 0
UNEXPECTED WEIGHT CHANGE: 0
VOMITING: 0
WHEEZING: 0
NAUSEA: 0
ADENOPATHY: 0
COUGH: 0
ABDOMINAL PAIN: 0
DIFFICULTY URINATING: 0
ABDOMINAL DISTENTION: 0
PALPITATIONS: 0
DIZZINESS: 0
SHORTNESS OF BREATH: 0

## 2018-10-11 NOTE — ASSESSMENT & PLAN NOTE
Not well controlled on oxybutynin - will d/c and give trial of Myrbetriq at this time. If no improvement after dose increase - may consider Urology referral

## 2018-10-11 NOTE — PATIENT INSTRUCTIONS
Patient Education     Overactive Bladder, Adult  Overactive bladder is a group of urinary symptoms. With overactive bladder, you may suddenly feel the need to pass urine (urinate) right away. After feeling this sudden urge, you might also leak urine if you cannot get to the bathroom fast enough (urinary incontinence). These symptoms might interfere with your daily work or social activities. Overactive bladder symptoms may also wake you up at night.  Overactive bladder affects the nerve signals between your bladder and your brain. Your bladder may get the signal to empty before it is full. Very sensitive muscles can also make your bladder squeeze too soon.  What are the causes?  Many things can cause an overactive bladder. Possible causes include:  · Urinary tract infection.  · Infection of nearby tissues, such as the prostate.  · Prostate enlargement.  · Being pregnant with twins or more (multiples).  · Surgery on the uterus or urethra.  · Bladder stones, inflammation, or tumors.  · Drinking too much caffeine or alcohol.  · Certain medicines, especially those that you take to help your body get rid of extra fluid (diuretics) by increasing urine production.  · Muscle or nerve weakness, especially from:  ¨ A spinal cord injury.  ¨ Stroke.  ¨ Multiple sclerosis.  ¨ Parkinson disease.  · Diabetes. This can cause a high urine volume that fills the bladder so quickly that the normal urge to urinate is triggered very strongly.  · Constipation. A buildup of too much stool can put pressure on your bladder.  What increases the risk?  You may be at greater risk for overactive bladder if you:  · Are an older adult.  · Smoke.  · Are going through menopause.  · Have prostate problems.  · Have a neurological disease, such as stroke, dementia, Parkinson disease, or multiple sclerosis (MS).  · Eat or drink things that irritate the bladder. These include alcohol, spicy food, and caffeine.  · Are overweight or obese.  What are the  signs or symptoms?  The signs and symptoms of an overactive bladder include:  · Sudden, strong urges to urinate.  · Leaking urine.  · Urinating eight or more times per day.  · Waking up to urinate two or more times per night.  How is this diagnosed?  Your health care provider may suspect overactive bladder based on your symptoms. The health care provider will do a physical exam and take your medical history. Blood or urine tests may also be done. For example, you might need to have a bladder function test to check how well you can hold your urine. You might also need to see a health care provider who specializes in the urinary tract (urologist).  How is this treated?  Treatment for overactive bladder depends on the cause of your condition and whether it is mild or severe. Certain treatments can be done in your health care provider's office or clinic. You can also make lifestyle changes at home. Options include:  Behavioral Treatments  · Biofeedback. A specialist uses sensors to help you become aware of your body’s signals.  · Keeping a daily log of when you need to urinate and what happens after the urge. This may help you manage your condition.  · Bladder training. This helps you learn to control the urge to urinate by following a schedule that directs you to urinate at regular intervals (timed voiding). At first, you might have to wait a few minutes after feeling the urge. In time, you should be able to schedule bathroom visits an hour or more apart.  · Kegel exercises. These are exercises to strengthen the pelvic floor muscles, which support the bladder. Toning these muscles can help you control urination, even if your bladder muscles are overactive. A specialist will teach you how to do these exercises correctly. They require daily practice.  · Weight loss. If you are obese or overweight, losing weight might relieve your symptoms of overactive bladder. Talk to your health care provider about losing weight and  whether there is a specific program or method that would work best for you.  · Diet change. This might help if constipation is making your overactive bladder worse. Your health care provider or a dietitian can explain ways to change what you eat to ease constipation. You might also need to consume less alcohol and caffeine or drink other fluids at different times of the day.  · Stopping smoking.  · Wearing pads to absorb leakage while you wait for other treatments to take effect.  Physical Treatments  · Electrical stimulation. Electrodes send gentle pulses of electricity to strengthen the nerves or muscles that help to control the bladder. Sometimes, the electrodes are placed outside of the body. In other cases, they might be placed inside the body (implanted). This treatment can take several months to have an effect.  · Supportive devices. Women may need a plastic device that fits into the vagina and supports the bladder (pessary).  Medicines  Several medicines can help treat overactive bladder and are usually used along with other treatments. Some are injected into the muscles involved in urination. Others come in pill form. Your health care provider may prescribe:  · Antispasmodics. These medicines block the signals that the nerves send to the bladder. This keeps the bladder from releasing urine at the wrong time.  · Tricyclic antidepressants. These types of antidepressants also relax bladder muscles.  Surgery  · You may have a device implanted to help manage the nerve signals that indicate when you need to urinate.  · You may have surgery to implant electrodes for electrical stimulation.  · Sometimes, very severe cases of overactive bladder require surgery to change the shape of the bladder.  Follow these instructions at home:  · Take medicines only as directed by your health care provider.  · Use any implants or a pessary as directed by your health care provider.  · Make any diet or lifestyle changes that are  recommended by your health care provider. These might include:  ¨ Drinking less fluid or drinking at different times of the day. If you need to urinate often during the night, you may need to stop drinking fluids early in the evening.  ¨ Cutting down on caffeine or alcohol. Both can make an overactive bladder worse. Caffeine is found in coffee, tea, and sodas.  ¨ Doing Kegel exercises to strengthen muscles.  ¨ Losing weight if you need to.  ¨ Eating a healthy and balanced diet to prevent constipation.  · Keep a journal or log to track how much and when you drink and also when you feel the need to urinate. This will help your health care provider to monitor your condition.  Contact a health care provider if:  · Your symptoms do not get better after treatment.  · Your pain and discomfort are getting worse.  · You have more frequent urges to urinate.  · You have a fever.  Get help right away if:  You are not able to control your bladder at all.  This information is not intended to replace advice given to you by your health care provider. Make sure you discuss any questions you have with your health care provider.  Document Released: 10/14/2010 Document Revised: 05/25/2017 Document Reviewed: 05/13/2015  [employer] Interactive Patient Education © 2018 [employer]

## 2018-10-11 NOTE — PROGRESS NOTES
Daily Progress Note       Patient ID: Kar Nicholas is a 53 y.o. female.    HPI    53 year old presents for follow up visit.      Recently had Right TKR performed on 8/7/2018. Did home PT x 2 weeks. Has since been doing out-patient PT twice weekly.     Reports 2 weeks ago she had bilateral flank pain for a few days. It was associated with dark colored urine. Improved and symptoms free until 1 night ago. Again 1 night ago she had bilateral flank pain and associated dark urine. States doing ok today.     Hx of Schizophrenia, Bipolar - Manic, PTSD and Anxiety - has been following up with Psychiatry - part of [facility]. Also doing group therapy Currently on abilify, seroquel and effexor XR - tolerating medications with no complaints. States doing well on current therapy.     Hypertension - currently on losartan 50 mg daily - tolerating with no complaints. Does not check BP at home      Hypercholesterolemia - currently on atorvastatin 10 mg qhs - tolerating with no complaints.      GERD - currently on omeprazole 20mg daily - tolerating with no complaints. Recently had been off the medication because she had no refills and states the reflux symptoms got significantly worse. States doing much better since being back on the medication      Migraines - reports well controlled at this time - usually will take advil with onset and take imitrex if significant. Reports last migraine was about 1-2 months ago. Had previously been 2-3x/week. Usually located frontal or occipital areas - throbbing sensation. + Light and sound sensitivity and Nausea. Relieved by laying in dark quiet room. + Aura     Overactive Bladder - currently on oxybutynin 5mg twice daily - states she feels like it is not helping. Still continues to have symptoms of overactive bladder- requesting to see if there's anything else we can try.      Nightmares - currently on prazosin with improvement in nightmares.      Hx of alcohol and drug  abuse. States has been alcohol free since October 31, 2016 and drug free since November 11, 2016. Was on cocaine     The following have been reviewed and updated as appropriate in this visit:  Allergies  Meds  Problems       Review of Systems   Constitutional: Negative for fatigue, fever and unexpected weight change.   HENT: Negative for trouble swallowing.    Eyes: Negative for visual disturbance.   Respiratory: Negative for cough, shortness of breath and wheezing.    Cardiovascular: Negative for chest pain, palpitations and leg swelling.   Gastrointestinal: Negative for abdominal distention, abdominal pain, blood in stool, constipation, diarrhea, nausea and vomiting.   Endocrine: Negative for cold intolerance and heat intolerance.   Genitourinary: Negative for difficulty urinating.   Skin: Negative for rash.   Neurological: Negative for dizziness, syncope, weakness, light-headedness and headaches.   Hematological: Negative for adenopathy.             Current Outpatient Prescriptions   Medication Sig Dispense Refill   • ARIPiprazole (ABILIFY) 30 mg tablet Take 1 tablet (30 mg total) by mouth daily.     • atorvastatin (LIPITOR) 10 mg tablet Take 1 tablet (10 mg total) by mouth every morning. 30 tablet 5   • losartan (COZAAR) 50 mg tablet Take 1 tablet (50 mg total) by mouth every morning. 30 tablet 5   • omeprazole (PriLOSEC) 20 mg capsule Take 1 capsule (20 mg total) by mouth daily before breakfast. 30 capsule 5   • prazosin (MINIPRESS) 2 mg capsule Take 2 capsules (4 mg total) by mouth nightly. 60 capsule 0   • QUEtiapine (SEROquel) 100 mg tablet Take 1 tablet (100 mg total) by mouth nightly. 90 tablet 0   • QUEtiapine (SEROquel) 50 mg tablet Take one (1) tablet orally (by mouth) three times daily 90 tablet 0   • venlafaxine XR (EFFEXOR-XR) 150 mg 24 hr capsule Take 1 capsule (150 mg total) by mouth daily. 30 capsule 0   • mirabegron (MYRBETRIQ) 25 mg ER tablet Take 1 tablet (25 mg total) by mouth daily. 30  tablet 5     No current facility-administered medications for this visit.      Past Medical History:   Diagnosis Date   • Alcoholism (CMS/[facility]) ([facility])     In Recovery  Since October 2016    • Anxiety    • Asthma     Remote   • Bipolar affect, depressed (CMS/[facility]) ([facility])    • Body mass index (BMI) 45.0-49.9, adult (CMS/[facility])    • Cocaine addiction (CMS/[facility]) ([facility])      None since November 2016   • Depression    • GERD (gastroesophageal reflux disease)    • Hypertension    • Lipid disorder    • Migraines    • Morbid obesity with BMI of 45.0-49.9, adult (CMS/[facility]) ([facility])    • Osteoarthritis     Knees   • Post-menopause    • Pre-diabetes    • Schizophrenia (CMS/[facility]) ([facility])    • Sciatica    • Sleep apnea     Uses CPAP occas- To bring CPAP Day of Sx     Family History   Problem Relation Age of Onset   • Colon cancer Mother    • Lung cancer Father    • Heart failure Brother    • Heart block Daughter      Past Surgical History:   Procedure Laterality Date   • COLONOSCOPY     • HYSTERECTOMY     • KNEE SURGERY Right    • REDUCTION MAMMAPLASTY Bilateral    • WISDOM TOOTH EXTRACTION       Social History     Social History   • Marital status: Single     Spouse name: N/A   • Number of children: N/A   • Years of education: N/A     Occupational History   • Not on file.     Social History Main Topics   • Smoking status: Current Every Day Smoker     Packs/day: 0.25     Years: 30.00   • Smokeless tobacco: Never Used   • Alcohol use No      Comment: Recovering Alcoholic since 10/2016   • Drug use: Yes     Types: Cocaine      Comment: None since 11/2016   • Sexual activity: Defer     Other Topics Concern   • Not on file     Social History Narrative   • No narrative on file     Allergies   Allergen Reactions   • Shellfish Containing Products Angioedema     Other reaction(s): shellfish       Objective   No new labs.    Vitals:    10/11/18 1533   BP: 110/76   BP Location: Left upper arm   Patient Position: Sitting   Pulse: 83   Temp: 36.8 °C (98.3  "°F)   TempSrc: Oral   SpO2: 97%   Weight: 136 kg (300 lb)   Height: 1.727 m (5' 8\")         Physical Exam   Constitutional: She is oriented to person, place, and time. She appears well-developed and well-nourished.   HENT:   Head: Normocephalic and atraumatic.   Eyes: Conjunctivae and EOM are normal. Pupils are equal, round, and reactive to light.   Neck: Normal range of motion. Neck supple.   Cardiovascular: Normal rate, regular rhythm and normal heart sounds.    Pulmonary/Chest: Effort normal and breath sounds normal. No respiratory distress.   Abdominal: Soft. Bowel sounds are normal. She exhibits no distension. There is no tenderness.   Musculoskeletal: Normal range of motion. She exhibits no edema.   Neurological: She is alert and oriented to person, place, and time.   Skin: Skin is warm and dry.   Nursing note and vitals reviewed.      Assessment/Plan   Problem List Items Addressed This Visit     Gastroesophageal reflux disease    Current Assessment & Plan     Reflux precautions given         Relevant Medications    omeprazole (PriLOSEC) 20 mg capsule    Hypercholesterolemia    Current Assessment & Plan     Counseled patient about therapeutic lifestyle changes          Relevant Medications    atorvastatin (LIPITOR) 10 mg tablet    Essential hypertension - Primary    Current Assessment & Plan     Well controlled. Cont low Na diet          Relevant Medications    losartan (COZAAR) 50 mg tablet    Anxiety    Overview     Follows up with Psychiatry         Relevant Medications    ARIPiprazole (ABILIFY) 30 mg tablet    venlafaxine XR (EFFEXOR-XR) 150 mg 24 hr capsule    QUEtiapine (SEROquel) 100 mg tablet    Bipolar disorder, manic (CMS/HCC) (HCC)    Overview     Follows up with Psychiatry         Relevant Medications    ARIPiprazole (ABILIFY) 30 mg tablet    venlafaxine XR (EFFEXOR-XR) 150 mg 24 hr capsule    QUEtiapine (SEROquel) 100 mg tablet    PTSD (post-traumatic stress disorder)    Overview     Follows up " with Psychiatry         Relevant Medications    ARIPiprazole (ABILIFY) 30 mg tablet    venlafaxine XR (EFFEXOR-XR) 150 mg 24 hr capsule    QUEtiapine (SEROquel) 100 mg tablet    Schizophrenia (CMS/HCC) (HCC)    Overview     Follows up with Psychiatry         Relevant Medications    ARIPiprazole (ABILIFY) 30 mg tablet    venlafaxine XR (EFFEXOR-XR) 150 mg 24 hr capsule    QUEtiapine (SEROquel) 100 mg tablet    Overactive bladder    Current Assessment & Plan     Not well controlled on oxybutynin - will d/c and give trial of Myrbetriq at this time. If no improvement after dose increase - may consider Urology referral          Relevant Medications    mirabegron (MYRBETRIQ) 25 mg ER tablet      Other Visit Diagnoses     Bilateral flank pain        Urine dipstick was negative. Will send urine for culture for confirmation. Will check CBC and CMP. If negative - may consider renal/bladder ultrasound    Relevant Orders    POCT urinalysis dipstick (Completed)    Urine culture    Comprehensive metabolic panel    CBC and Differential          Prosper Grullon MD  10/11/2018

## 2018-10-12 ENCOUNTER — TELEPHONE (OUTPATIENT)
Dept: FAMILY MEDICINE | Facility: CLINIC | Age: 53
End: 2018-10-12

## 2018-10-12 DIAGNOSIS — R74.8 ELEVATED SERUM ALKALINE PHOSPHATASE LEVEL: ICD-10-CM

## 2018-10-12 DIAGNOSIS — R74.8 ELEVATED LIVER ENZYMES: Primary | ICD-10-CM

## 2018-10-12 LAB
BACTERIA UR CULT: NORMAL
BACTERIA UR CULT: NORMAL

## 2018-10-12 NOTE — TELEPHONE ENCOUNTER
Patient returned your call re: liver ultrasound appointment. The first available in [place] is at the end of the month.  She is saying Judith is to see if she can get her in sooner, as she is unable to go to [place].    Also the reason she wanted a prescription of Oxycodone is that she has a knee replacement and is in a lot of pain.  Please advise.

## 2018-10-12 NOTE — TELEPHONE ENCOUNTER
Can you see if they can work her in sooner downstairs?    Oxycodone refill should come from the surgeon who performed the surgery. Should be a prescription without acetaminophen due to the elevated liver enzymes

## 2018-10-12 NOTE — TELEPHONE ENCOUNTER
Allen from pharmacy l/m 10/11/18 @ 16:43 asking if oxybutinin was discontinued, if so they will have to take them from blistered packaging for pt?    [phone]    Per conversation with Dr. Grullon. He did discontinue that and explained to pt also. Spoke with Allen and asked him to clarify with pt what the pill looks like so she can take them out of her prepackaged meds.

## 2018-10-12 NOTE — TELEPHONE ENCOUNTER
Please advise patient that her liver enzymes are significantly elevated as well as her alkaline phosphatase. Would like her to get a liver ultrasound done soon as possible

## 2018-10-12 NOTE — TELEPHONE ENCOUNTER
Pt advised that I may not be able to get a quicker appointment at this location.  I did give her phone number for [facility] outpatient imaging in [place].  She will call and let us know where to fax order.  She was advised of prescription request and stated, when she ran out of the original rx, she was taking large amounts of Ibuprofen for the pain and may be the cause of her labs being high.

## 2018-10-16 ENCOUNTER — TELEPHONE (OUTPATIENT)
Dept: FAMILY MEDICINE | Facility: CLINIC | Age: 53
End: 2018-10-16

## 2018-10-16 DIAGNOSIS — R74.8 ELEVATED LIVER ENZYMES: Primary | ICD-10-CM

## 2018-10-16 DIAGNOSIS — R94.5 ABNORMAL RESULTS OF LIVER FUNCTION STUDIES: ICD-10-CM

## 2018-10-16 DIAGNOSIS — R73.9 HYPERGLYCEMIA: ICD-10-CM

## 2018-10-16 NOTE — TELEPHONE ENCOUNTER
Pt calling to let Dr. Grullon know she was not able to get appt for liver imaging until 10/31 through [facility] in [place].  Please let her know if this is too long to wait.  Ph. [phone]

## 2018-10-16 NOTE — TELEPHONE ENCOUNTER
Pt will call [facility] to see if appointment is still available and let us know.  Per our conversation, I advised her to have labs done on  10/22, 10/23 at the latest.  Pt uses [facility] lab.

## 2018-10-16 NOTE — TELEPHONE ENCOUNTER
Pt l/m updating us that her appointment is 11/5/18 at [facility] imaging.  I attempted both numbers in chart to let her know we are aware, but was unable to.

## 2018-10-22 ENCOUNTER — TELEPHONE (OUTPATIENT)
Dept: FAMILY MEDICINE | Facility: CLINIC | Age: 53
End: 2018-10-22

## 2018-10-22 DIAGNOSIS — I10 ESSENTIAL HYPERTENSION: ICD-10-CM

## 2018-10-23 ENCOUNTER — TRANSCRIBE ORDERS (OUTPATIENT)
Dept: LAB | Age: 53
End: 2018-10-23

## 2018-10-23 ENCOUNTER — APPOINTMENT (OUTPATIENT)
Dept: LAB | Age: 53
End: 2018-10-23
Attending: FAMILY MEDICINE
Payer: MEDICARE

## 2018-10-23 DIAGNOSIS — R73.9 HYPERGLYCEMIA: ICD-10-CM

## 2018-10-23 DIAGNOSIS — E88.810 METABOLIC SYNDROME: ICD-10-CM

## 2018-10-23 DIAGNOSIS — Z09 ENCOUNTER FOR FOLLOW-UP EXAMINATION AFTER COMPLETED TREATMENT FOR CONDITIONS OTHER THAN MALIGNANT NEOPLASM: ICD-10-CM

## 2018-10-23 DIAGNOSIS — Z79.899 OTHER LONG TERM (CURRENT) DRUG THERAPY: ICD-10-CM

## 2018-10-23 DIAGNOSIS — E88.810 METABOLIC SYNDROME: Primary | ICD-10-CM

## 2018-10-23 DIAGNOSIS — R94.5 ABNORMAL RESULTS OF LIVER FUNCTION STUDIES: ICD-10-CM

## 2018-10-23 DIAGNOSIS — R74.8 ELEVATED LIVER ENZYMES: ICD-10-CM

## 2018-10-23 LAB
25(OH)D3 SERPL-MCNC: 14 NG/ML (ref 30–100)
ALBUMIN SERPL-MCNC: 3.8 G/DL (ref 3.4–5)
ALP SERPL-CCNC: 135 IU/L (ref 35–126)
ALT SERPL-CCNC: 21 IU/L (ref 11–54)
ANION GAP SERPL CALC-SCNC: 9 MEQ/L (ref 3–15)
AST SERPL-CCNC: 16 IU/L (ref 15–41)
BASOPHILS # BLD: 0.03 K/UL (ref 0.01–0.1)
BASOPHILS NFR BLD: 0.6 %
BILIRUB SERPL-MCNC: 0.4 MG/DL (ref 0.3–1.2)
BUN SERPL-MCNC: 12 MG/DL (ref 8–20)
CALCIUM SERPL-MCNC: 9.5 MG/DL (ref 8.9–10.3)
CHLORIDE SERPL-SCNC: 104 MEQ/L (ref 98–109)
CHOLEST SERPL-MCNC: 186 MG/DL
CO2 SERPL-SCNC: 26 MEQ/L (ref 22–32)
CREAT SERPL-MCNC: 0.8 MG/DL (ref 0.6–1.1)
DIFFERENTIAL METHOD BLD: NORMAL
EOSINOPHIL # BLD: 0.11 K/UL (ref 0.04–0.36)
EOSINOPHIL NFR BLD: 2.3 %
ERYTHROCYTE [DISTWIDTH] IN BLOOD BY AUTOMATED COUNT: 13.9 % (ref 11.7–14.4)
FERRITIN SERPL-MCNC: 59 NG/ML (ref 11–250)
GFR SERPL CREATININE-BSD FRML MDRD: >60 ML/MIN/1.73M*2
GLUCOSE P FAST SERPL-MCNC: 79 MG/DL (ref 70–99)
GLUCOSE SERPL-MCNC: 87 MG/DL (ref 70–99)
HCT VFR BLDCO AUTO: 38.4 % (ref 35–45)
HDLC SERPL-MCNC: 55 MG/DL
HDLC SERPL: 3.4 {RATIO}
HGB BLD-MCNC: 12.3 G/DL (ref 11.8–15.7)
IMM GRANULOCYTES # BLD AUTO: 0.02 K/UL (ref 0–0.08)
IMM GRANULOCYTES NFR BLD AUTO: 0.4 %
IRON SATN MFR SERPL: 11 % (ref 15–45)
IRON SERPL-MCNC: 41 UG/DL (ref 35–150)
LDLC SERPL CALC-MCNC: 114 MG/DL
LYMPHOCYTES # BLD: 1.58 K/UL (ref 1.2–3.5)
LYMPHOCYTES NFR BLD: 32.6 %
MCH RBC QN AUTO: 27.8 PG (ref 28–33.2)
MCHC RBC AUTO-ENTMCNC: 32 G/DL (ref 32.2–35.5)
MCV RBC AUTO: 86.7 FL (ref 83–98)
MONOCYTES # BLD: 0.33 K/UL (ref 0.28–0.8)
MONOCYTES NFR BLD: 6.8 %
NEUTROPHILS # BLD: 2.77 K/UL (ref 1.7–7)
NEUTS SEG NFR BLD: 57.3 %
NONHDLC SERPL-MCNC: 131 MG/DL
NRBC BLD-RTO: 0 %
PDW BLD AUTO: 11.6 FL (ref 9.4–12.3)
PLATELET # BLD AUTO: 280 K/UL (ref 150–369)
POTASSIUM SERPL-SCNC: 4 MEQ/L (ref 3.6–5.1)
PROT SERPL-MCNC: 7 G/DL (ref 6–8.2)
RBC # BLD AUTO: 4.43 M/UL (ref 3.93–5.22)
SODIUM SERPL-SCNC: 139 MEQ/L (ref 136–144)
TIBC SERPL-MCNC: 365 UG/DL (ref 270–460)
TRIGL SERPL-MCNC: 87 MG/DL (ref 30–149)
TSH SERPL DL<=0.05 MIU/L-ACNC: 2.15 MIU/L (ref 0.34–5.6)
UIBC SERPL-MCNC: 324 UG/DL (ref 180–360)
WBC # BLD AUTO: 4.84 K/UL (ref 3.8–10.5)

## 2018-10-23 PROCEDURE — 83550 IRON BINDING TEST: CPT

## 2018-10-23 PROCEDURE — 82306 VITAMIN D 25 HYDROXY: CPT

## 2018-10-23 PROCEDURE — 83036 HEMOGLOBIN GLYCOSYLATED A1C: CPT

## 2018-10-23 PROCEDURE — 85025 COMPLETE CBC W/AUTO DIFF WBC: CPT

## 2018-10-23 PROCEDURE — 84443 ASSAY THYROID STIM HORMONE: CPT

## 2018-10-23 PROCEDURE — 80053 COMPREHEN METABOLIC PANEL: CPT

## 2018-10-23 PROCEDURE — 36415 COLL VENOUS BLD VENIPUNCTURE: CPT

## 2018-10-23 PROCEDURE — 82947 ASSAY GLUCOSE BLOOD QUANT: CPT | Mod: 59

## 2018-10-23 PROCEDURE — 86705 HEP B CORE ANTIBODY IGM: CPT

## 2018-10-23 PROCEDURE — 82728 ASSAY OF FERRITIN: CPT

## 2018-10-23 PROCEDURE — 80061 LIPID PANEL: CPT

## 2018-10-23 RX ORDER — LOSARTAN POTASSIUM 50 MG/1
50 TABLET ORAL DAILY
Qty: 30 TABLET | Refills: 5 | Status: SHIPPED | OUTPATIENT
Start: 2018-10-23 | End: 2019-01-21

## 2018-10-24 LAB
EST. AVERAGE GLUCOSE BLD GHB EST-MCNC: 114 MG/DL
HAV IGM SER QL: NONREACTIVE
HBA1C MFR BLD HPLC: 5.6 %
HBV CORE IGM SER QL: NONREACTIVE
HBV SURFACE AG SER QL: NONREACTIVE
HCV AB SER QL: NONREACTIVE

## 2018-10-24 NOTE — TELEPHONE ENCOUNTER
Please advise patient that her liver enzymes returned back to normal and other testing did come back negative. May have been related to the pain medications she was taking. Still would like her to proceed with the ultrasound that was ordered and scheduled.

## 2018-10-30 ENCOUNTER — TELEPHONE (OUTPATIENT)
Dept: FAMILY MEDICINE | Facility: CLINIC | Age: 53
End: 2018-10-30

## 2018-10-30 NOTE — TELEPHONE ENCOUNTER
Pt l/m @ 10:00 requesting a prescription for [facility] D [facility] [place].     [phone]    I l/m on her vm asking for clarification on pharmacy, zyrtec does not appear in her medication., I asked her to call office with information

## 2018-10-31 ENCOUNTER — TELEPHONE (OUTPATIENT)
Dept: FAMILY MEDICINE | Facility: CLINIC | Age: 53
End: 2018-10-31

## 2018-11-01 NOTE — TELEPHONE ENCOUNTER
Called and left a very detailed VM for pt, ok per LIZETTE, letting her know that claritan- D is an OTC medication and that she can find it at any grocery store or pharmacy.

## 2018-11-05 ENCOUNTER — HOSPITAL ENCOUNTER (OUTPATIENT)
Dept: RADIOLOGY | Age: 53
Discharge: HOME | End: 2018-11-05
Attending: FAMILY MEDICINE
Payer: MEDICARE

## 2018-11-05 DIAGNOSIS — R74.8 ELEVATED LIVER ENZYMES: ICD-10-CM

## 2018-11-05 DIAGNOSIS — R74.8 ELEVATED SERUM ALKALINE PHOSPHATASE LEVEL: ICD-10-CM

## 2018-11-05 PROCEDURE — 76705 ECHO EXAM OF ABDOMEN: CPT

## 2018-11-08 ENCOUNTER — TELEPHONE (OUTPATIENT)
Dept: FAMILY MEDICINE | Facility: CLINIC | Age: 53
End: 2018-11-08

## 2018-11-08 RX ORDER — LIDOCAINE 50 MG/G
1 PATCH TOPICAL DAILY
Qty: 30 PATCH | Refills: 1 | Status: SHIPPED | OUTPATIENT
Start: 2018-11-08 | End: 2018-11-08 | Stop reason: SDUPTHER

## 2018-11-08 RX ORDER — LIDOCAINE 50 MG/G
1 PATCH TOPICAL DAILY
Qty: 30 PATCH | Refills: 1 | Status: SHIPPED | OUTPATIENT
Start: 2018-11-08 | End: 2018-11-16

## 2018-11-08 RX ORDER — OXYCODONE HYDROCHLORIDE 5 MG/1
5-10 TABLET ORAL EVERY 4 HOURS PRN
Qty: 60 TABLET | Refills: 0 | OUTPATIENT
Start: 2018-11-08 | End: 2018-11-13

## 2018-11-08 NOTE — TELEPHONE ENCOUNTER
PT WANTS THIS CALLED IN ASAP. EXPLAINED TO HER THE 48 HOUR POLICY. PLEASE CALL WHEN SENT IN. CALL BACK # [phone]

## 2018-11-08 NOTE — TELEPHONE ENCOUNTER
Discussed ultrasound report - due to gall bladder filled with gallstones - will refer patient to surgeon at this time. Given information for Dr Jenkins - she states she will follow up    She reports she is having mid back pain that continues to be bothersome for her. Has tried acetaminophen and ibuprofen with some improvement but reports that I had told her to stop taking due to the elevated liver enzymes. Advised her we can give trial of OTC lidocaine patches. She states she can not afford them. Will attempt to send prescription for lidocaine patches

## 2018-11-08 NOTE — TELEPHONE ENCOUNTER
As documented I called pt back and received vm, left her a detailed message explaining this, as did Dr. Grullon

## 2018-11-08 NOTE — TELEPHONE ENCOUNTER
I spoke with pt @ 15:25 explained this to her again.  I asked her if she was going to try the lidoderm patch that was sent in?  If so I will request it sent to the [facility] pharmacy on [address] in [place], I will put in pt chart.  She is going to f/u with the surgeon next week.  She did agree to try the patch and I explained it goes on the skin and replace q12 hour PRN

## 2018-11-08 NOTE — TELEPHONE ENCOUNTER
Oxycodone refill denied. She may get that from ortho if they deem necessary. I had spoken to her on the phone earlier and advised I can give her a trial of the patches so a script was sent to the pharmacy

## 2018-11-08 NOTE — TELEPHONE ENCOUNTER
Pt called a few times this afternoon within an hour period to check and make sure Judith receives her vm she left on KP's line.  She does not want the lidocaine patch.  She wants oxycodone 5mg sent to [facility].  I read what CARMITA wrote (to myself) and understand he may not be sending that but patient may need more guidance. Pt ph. [phone]

## 2018-11-09 NOTE — TELEPHONE ENCOUNTER
Dr Grullon  Lidocaine patch not approved.  PA attempted , still denied.  Would you like for  Me to adv to try OTC salon pas patches? Please adv ty

## 2018-11-09 NOTE — TELEPHONE ENCOUNTER
Pt was advised  She asked if Dr Grullon would prescribe oxycodone for her since her insurance will not cover the lidocaine patches, and she cannot afford the salon pas,  I advised that we do not prescribe narcotics in the place of a lidocaine patch and recommended that she contact that office to see if they can move up her appt.

## 2018-11-09 NOTE — TELEPHONE ENCOUNTER
[facility] l/m @ 4854 advising us that the lidocaine 5% patch that was sent requires a prior auth.  They left insurance phone number [phone] .  Last insurance card scanned was medicare  ID # [id number].

## 2018-11-09 NOTE — TELEPHONE ENCOUNTER
I advised her that yesterday - she states she can't afford the OTC patches. We can refer her back to Ortho - believe she has an appt next week or it's coming up

## 2018-11-20 ENCOUNTER — ANESTHESIA EVENT (OUTPATIENT)
Dept: OPERATING ROOM | Facility: HOSPITAL | Age: 53
Setting detail: HOSPITAL OUTPATIENT SURGERY
End: 2018-11-20
Payer: MEDICARE

## 2018-12-04 ENCOUNTER — ANESTHESIA (OUTPATIENT)
Dept: OPERATING ROOM | Facility: HOSPITAL | Age: 53
Setting detail: HOSPITAL OUTPATIENT SURGERY
End: 2018-12-04
Payer: MEDICARE

## 2018-12-04 ENCOUNTER — HOSPITAL ENCOUNTER (OUTPATIENT)
Facility: HOSPITAL | Age: 53
Setting detail: HOSPITAL OUTPATIENT SURGERY
Discharge: HOME | End: 2018-12-04
Attending: SURGERY | Admitting: SURGERY
Payer: MEDICARE

## 2018-12-04 VITALS
HEIGHT: 68 IN | SYSTOLIC BLOOD PRESSURE: 131 MMHG | WEIGHT: 293 LBS | BODY MASS INDEX: 44.41 KG/M2 | OXYGEN SATURATION: 94 % | DIASTOLIC BLOOD PRESSURE: 72 MMHG | HEART RATE: 63 BPM | RESPIRATION RATE: 15 BRPM | TEMPERATURE: 97.9 F

## 2018-12-04 DIAGNOSIS — K80.10 CALCULUS OF GALLBLADDER WITH CHOLECYSTITIS WITHOUT BILIARY OBSTRUCTION, UNSPECIFIED CHOLECYSTITIS ACUITY: ICD-10-CM

## 2018-12-04 LAB
ANION GAP SERPL CALC-SCNC: 8 MEQ/L (ref 3–15)
BUN SERPL-MCNC: 9 MG/DL (ref 8–20)
CALCIUM SERPL-MCNC: 9.1 MG/DL (ref 8.9–10.3)
CHLORIDE SERPL-SCNC: 107 MEQ/L (ref 98–109)
CO2 SERPL-SCNC: 24 MEQ/L (ref 22–32)
CREAT SERPL-MCNC: 0.9 MG/DL
ERYTHROCYTE [DISTWIDTH] IN BLOOD BY AUTOMATED COUNT: 14 % (ref 11.7–14.4)
GFR SERPL CREATININE-BSD FRML MDRD: >60 ML/MIN/1.73M*2
GLUCOSE SERPL-MCNC: 100 MG/DL (ref 70–99)
HCT VFR BLDCO AUTO: 40.5 %
HGB BLD-MCNC: 13 G/DL
MCH RBC QN AUTO: 26.7 PG (ref 28–33.2)
MCHC RBC AUTO-ENTMCNC: 32.1 G/DL (ref 32.2–35.5)
MCV RBC AUTO: 83.2 FL (ref 83–98)
PDW BLD AUTO: 10.9 FL (ref 9.4–12.3)
PLATELET # BLD AUTO: 243 K/UL
POTASSIUM SERPL-SCNC: 3.9 MEQ/L (ref 3.6–5.1)
RBC # BLD AUTO: 4.87 M/UL (ref 3.93–5.22)
SODIUM SERPL-SCNC: 139 MEQ/L (ref 136–144)
WBC # BLD AUTO: 4.65 K/UL

## 2018-12-04 PROCEDURE — 37000001 HC ANESTHESIA GENERAL: Performed by: SURGERY

## 2018-12-04 PROCEDURE — 63600000 HC DRUGS/DETAIL CODE: Performed by: NURSE ANESTHETIST, CERTIFIED REGISTERED

## 2018-12-04 PROCEDURE — 0FT44ZZ RESECTION OF GALLBLADDER, PERCUTANEOUS ENDOSCOPIC APPROACH: ICD-10-PCS | Performed by: SURGERY

## 2018-12-04 PROCEDURE — 36000014 HC OR LEVEL 4 EA ADDL MIN: Performed by: SURGERY

## 2018-12-04 PROCEDURE — 25000000 HC PHARMACY GENERAL: Performed by: SURGERY

## 2018-12-04 PROCEDURE — 25800000 HC PHARMACY IV SOLUTIONS: Performed by: SURGERY

## 2018-12-04 PROCEDURE — 71000012 HC PACU PHASE 2 EA ADDL MIN: Performed by: SURGERY

## 2018-12-04 PROCEDURE — 71000002 HC PACU PHASE 2 INITIAL 30MIN: Performed by: SURGERY

## 2018-12-04 PROCEDURE — 63600000 HC DRUGS/DETAIL CODE: Performed by: ANESTHESIOLOGY

## 2018-12-04 PROCEDURE — 63700000 HC SELF-ADMINISTRABLE DRUG: Performed by: SURGERY

## 2018-12-04 PROCEDURE — 36000004 HC OR LEVEL 4 INITIAL 30MIN: Performed by: SURGERY

## 2018-12-04 PROCEDURE — 88304 TISSUE EXAM BY PATHOLOGIST: CPT | Performed by: SURGERY

## 2018-12-04 PROCEDURE — 27200000 HC STERILE SUPPLY: Performed by: SURGERY

## 2018-12-04 PROCEDURE — 71000011 HC PACU PHASE 1 EA ADDL MIN: Performed by: SURGERY

## 2018-12-04 PROCEDURE — 85027 COMPLETE CBC AUTOMATED: CPT | Performed by: SURGERY

## 2018-12-04 PROCEDURE — 80048 BASIC METABOLIC PNL TOTAL CA: CPT | Performed by: SURGERY

## 2018-12-04 PROCEDURE — 71000001 HC PACU PHASE 1 INITIAL 30MIN: Performed by: SURGERY

## 2018-12-04 PROCEDURE — 25000000 HC PHARMACY GENERAL: Performed by: NURSE ANESTHETIST, CERTIFIED REGISTERED

## 2018-12-04 PROCEDURE — 36415 COLL VENOUS BLD VENIPUNCTURE: CPT | Performed by: SURGERY

## 2018-12-04 RX ORDER — OXYCODONE AND ACETAMINOPHEN 5; 325 MG/1; MG/1
TABLET ORAL
Status: DISCONTINUED
Start: 2018-12-04 | End: 2018-12-04 | Stop reason: HOSPADM

## 2018-12-04 RX ORDER — FENTANYL CITRATE 50 UG/ML
INJECTION, SOLUTION INTRAMUSCULAR; INTRAVENOUS AS NEEDED
Status: DISCONTINUED | OUTPATIENT
Start: 2018-12-04 | End: 2018-12-04 | Stop reason: SURG

## 2018-12-04 RX ORDER — NEOSTIGMINE METHYLSULFATE 1 MG/ML
INJECTION INTRAVENOUS AS NEEDED
Status: DISCONTINUED | OUTPATIENT
Start: 2018-12-04 | End: 2018-12-04 | Stop reason: SURG

## 2018-12-04 RX ORDER — KETOROLAC TROMETHAMINE 15 MG/ML
15 INJECTION, SOLUTION INTRAMUSCULAR; INTRAVENOUS ONCE
Status: DISCONTINUED | OUTPATIENT
Start: 2018-12-04 | End: 2018-12-04 | Stop reason: HOSPADM

## 2018-12-04 RX ORDER — OXYCODONE AND ACETAMINOPHEN 5; 325 MG/1; MG/1
1 TABLET ORAL EVERY 6 HOURS PRN
Status: DISCONTINUED | OUTPATIENT
Start: 2018-12-04 | End: 2018-12-04 | Stop reason: HOSPADM

## 2018-12-04 RX ORDER — ROCURONIUM BROMIDE 10 MG/ML
INJECTION, SOLUTION INTRAVENOUS AS NEEDED
Status: DISCONTINUED | OUTPATIENT
Start: 2018-12-04 | End: 2018-12-04 | Stop reason: SURG

## 2018-12-04 RX ORDER — SODIUM CHLORIDE 9 MG/ML
INJECTION, SOLUTION INTRAVENOUS CONTINUOUS
Status: DISCONTINUED | OUTPATIENT
Start: 2018-12-04 | End: 2018-12-04 | Stop reason: HOSPADM

## 2018-12-04 RX ORDER — DEXAMETHASONE SODIUM PHOSPHATE 4 MG/ML
INJECTION, SOLUTION INTRA-ARTICULAR; INTRALESIONAL; INTRAMUSCULAR; INTRAVENOUS; SOFT TISSUE AS NEEDED
Status: DISCONTINUED | OUTPATIENT
Start: 2018-12-04 | End: 2018-12-04 | Stop reason: SURG

## 2018-12-04 RX ORDER — MORPHINE SULFATE 4 MG/ML
2 INJECTION, SOLUTION INTRAMUSCULAR; INTRAVENOUS
Status: DISCONTINUED | OUTPATIENT
Start: 2018-12-04 | End: 2018-12-04 | Stop reason: HOSPADM

## 2018-12-04 RX ORDER — MIDAZOLAM HYDROCHLORIDE 2 MG/2ML
INJECTION, SOLUTION INTRAMUSCULAR; INTRAVENOUS AS NEEDED
Status: DISCONTINUED | OUTPATIENT
Start: 2018-12-04 | End: 2018-12-04 | Stop reason: SURG

## 2018-12-04 RX ORDER — PROPOFOL 10 MG/ML
INJECTION, EMULSION INTRAVENOUS AS NEEDED
Status: DISCONTINUED | OUTPATIENT
Start: 2018-12-04 | End: 2018-12-04 | Stop reason: SURG

## 2018-12-04 RX ORDER — BUPIVACAINE HYDROCHLORIDE AND EPINEPHRINE 5; 5 MG/ML; UG/ML
INJECTION, SOLUTION EPIDURAL; INTRACAUDAL; PERINEURAL AS NEEDED
Status: DISCONTINUED | OUTPATIENT
Start: 2018-12-04 | End: 2018-12-04 | Stop reason: HOSPADM

## 2018-12-04 RX ORDER — KETOROLAC TROMETHAMINE 30 MG/ML
INJECTION, SOLUTION INTRAMUSCULAR; INTRAVENOUS AS NEEDED
Status: DISCONTINUED | OUTPATIENT
Start: 2018-12-04 | End: 2018-12-04 | Stop reason: SURG

## 2018-12-04 RX ORDER — FENTANYL CITRATE 50 UG/ML
50 INJECTION, SOLUTION INTRAMUSCULAR; INTRAVENOUS
Status: DISCONTINUED | OUTPATIENT
Start: 2018-12-04 | End: 2018-12-04 | Stop reason: HOSPADM

## 2018-12-04 RX ORDER — GLYCOPYRROLATE 0.6MG/3ML
SYRINGE (ML) INTRAVENOUS AS NEEDED
Status: DISCONTINUED | OUTPATIENT
Start: 2018-12-04 | End: 2018-12-04 | Stop reason: SURG

## 2018-12-04 RX ORDER — ONDANSETRON HYDROCHLORIDE 2 MG/ML
INJECTION, SOLUTION INTRAVENOUS AS NEEDED
Status: DISCONTINUED | OUTPATIENT
Start: 2018-12-04 | End: 2018-12-04 | Stop reason: SURG

## 2018-12-04 RX ORDER — LIDOCAINE HYDROCHLORIDE 10 MG/ML
INJECTION, SOLUTION INFILTRATION; PERINEURAL AS NEEDED
Status: DISCONTINUED | OUTPATIENT
Start: 2018-12-04 | End: 2018-12-04 | Stop reason: SURG

## 2018-12-04 RX ADMIN — ROCURONIUM BROMIDE 35 MG: 10 INJECTION INTRAVENOUS at 10:13

## 2018-12-04 RX ADMIN — KETOROLAC TROMETHAMINE 30 MG: 30 INJECTION, SOLUTION INTRAMUSCULAR at 10:45

## 2018-12-04 RX ADMIN — PROPOFOL 200 MG: 10 INJECTION, EMULSION INTRAVENOUS at 10:12

## 2018-12-04 RX ADMIN — OXYCODONE HYDROCHLORIDE AND ACETAMINOPHEN 1 TABLET: 5; 325 TABLET ORAL at 15:04

## 2018-12-04 RX ADMIN — LIDOCAINE HYDROCHLORIDE 5 ML: 10 INJECTION, SOLUTION INFILTRATION; PERINEURAL at 10:12

## 2018-12-04 RX ADMIN — ROCURONIUM BROMIDE 15 MG: 10 INJECTION INTRAVENOUS at 10:20

## 2018-12-04 RX ADMIN — SODIUM CHLORIDE: 9 INJECTION, SOLUTION INTRAVENOUS at 07:44

## 2018-12-04 RX ADMIN — DEXAMETHASONE SODIUM PHOSPHATE 4 MG: 4 INJECTION, SOLUTION INTRAMUSCULAR; INTRAVENOUS at 10:20

## 2018-12-04 RX ADMIN — FENTANYL CITRATE 50 MCG: 50 INJECTION, SOLUTION INTRAMUSCULAR; INTRAVENOUS at 11:45

## 2018-12-04 RX ADMIN — GLYCOPYRROLATE 0.6 MG: 0.2 INJECTION, SOLUTION INTRAMUSCULAR; INTRAVENOUS at 10:40

## 2018-12-04 RX ADMIN — FENTANYL CITRATE 50 MCG: 50 INJECTION INTRAMUSCULAR; INTRAVENOUS at 10:20

## 2018-12-04 RX ADMIN — FENTANYL CITRATE 100 MCG: 50 INJECTION INTRAMUSCULAR; INTRAVENOUS at 10:24

## 2018-12-04 RX ADMIN — FENTANYL CITRATE 50 MCG: 50 INJECTION, SOLUTION INTRAMUSCULAR; INTRAVENOUS at 11:16

## 2018-12-04 RX ADMIN — MIDAZOLAM HYDROCHLORIDE 2 MG: 1 INJECTION, SOLUTION INTRAMUSCULAR; INTRAVENOUS at 10:06

## 2018-12-04 RX ADMIN — NEOSTIGMINE METHYLSULFATE 4 MG: 1 INJECTION INTRAVENOUS at 10:40

## 2018-12-04 RX ADMIN — ONDANSETRON 4 MG: 2 INJECTION INTRAMUSCULAR; INTRAVENOUS at 10:20

## 2018-12-04 RX ADMIN — FENTANYL CITRATE 50 MCG: 50 INJECTION INTRAMUSCULAR; INTRAVENOUS at 10:12

## 2018-12-04 ASSESSMENT — PAIN - FUNCTIONAL ASSESSMENT
PAIN_FUNCTIONAL_ASSESSMENT: 0-10
PAIN_FUNCTIONAL_ASSESSMENT: NO/DENIES PAIN
PAIN_FUNCTIONAL_ASSESSMENT: NO/DENIES PAIN
PAIN_FUNCTIONAL_ASSESSMENT: 0-10
PAIN_FUNCTIONAL_ASSESSMENT: NO/DENIES PAIN

## 2018-12-04 ASSESSMENT — ENCOUNTER SYMPTOMS
HEADACHES: 1
DEPRESSION: 1

## 2018-12-04 ASSESSMENT — LIFESTYLE VARIABLES: TOBACCO_USE: 1

## 2018-12-04 NOTE — ANESTHESIA POSTPROCEDURE EVALUATION
Patient: Kar Nicholas    Procedure Summary     Date:  12/04/18 Room / Location:   OR 3 / [facility] OR    Anesthesia Start:  1006 Anesthesia Stop:  1105    Procedure:  CHOLECYSTECTOMY LAPAROSCOPIC (N/A ) Diagnosis:       Calculus of gallbladder with cholecystitis without biliary obstruction, unspecified cholecystitis acuity      (Cholelithiasis)    Surgeon:  Chidi Jenkins MD Responsible Provider:  Urvashi Hassan MD    Anesthesia Type:  general ASA Status:  2          Anesthesia Type: general  PACU Vitals  12/4/2018 1059 - 12/4/2018 1139      12/4/2018 1108             BP: (!)  118/57    Temp: 36.9 °C (98.4 °F)    Pulse: 64    Resp: 16    SpO2: 93 %            Anesthesia Post Evaluation    Pain management: adequate  Patient location during evaluation: PACU  Patient participation: complete - patient participated  Level of consciousness: awake and alert  Cardiovascular status: acceptable  Airway Patency: adequate  Respiratory status: acceptable  Hydration status: acceptable  Anesthetic complications: no

## 2018-12-04 NOTE — OR SURGEON
Pre-Procedure patient identification:  I am the primary operating surgeon/proceduralist and I have identified the patient on 12/04/18 at 9:31 AM Chidi Jenkins MD  Phone Number: [phone]

## 2018-12-04 NOTE — ANESTHESIA PROCEDURE NOTES
Airway  Urgency: elective    Start Time: 12/4/2018 10:15 AM  Airway not difficult    General Information and Staff    Patient location during procedure: OR  Anesthesiologist: GALEN MCNEIL  Resident/CRNA: SHALOM MACK  Performed: resident/CRNA     Indications and Patient Condition  Indications for airway management: anesthesia and airway protection  Sedation level: general  Preoxygenated: yes  Patient position: sniffing  MILS not maintained throughout  Mask difficulty assessment: 2 - vent by mask + OA or adjuvant +/- NMBA    Final Airway Details  Final airway type: endotracheal airway      Successful airway: ETT  Cuffed: yes   Successful intubation technique: video laryngoscopy  Facilitating devices/methods: intubating stylet  Endotracheal tube insertion site: oral  Blade: Anil  Blade size: #3  ETT size: 7.5 mm  Cormack-Lehane Classification: grade I - full view of glottis  Placement verified by: chest auscultation and capnometry   Measured from: teeth  ETT to teeth (cm): 23  Number of attempts at approach: 1  Ventilation between attempts: none  Number of other approaches attempted: 0  Atraumatic airway insertion

## 2018-12-04 NOTE — DISCHARGE INSTRUCTIONS
Laparoscopic Cholecystectomy  Laparoscopic cholecystectomy is surgery to remove the gallbladder. The gallbladder is a pear-shaped organ that lies beneath the liver on the right side of the body. The gallbladder stores bile, which is a fluid that helps the body to digest fats. Cholecystectomy is often done for inflammation of the gallbladder (cholecystitis). This condition is usually caused by a buildup of gallstones (cholelithiasis) in the gallbladder. Gallstones can block the flow of bile, which can result in inflammation and pain. In severe cases, emergency surgery may be required.  This procedure is done though small incisions in your abdomen (laparoscopic surgery). A thin scope with a camera (laparoscope) is inserted through one incision. Thin surgical instruments are inserted through the other incisions. In some cases, a laparoscopic procedure may be turned into a type of surgery that is done through a larger incision (open surgery).  Tell a health care provider about:  · Any allergies you have.  · All medicines you are taking, including vitamins, herbs, eye drops, creams, and over-the-counter medicines.  · Any problems you or family members have had with anesthetic medicines.  · Any blood disorders you have.  · Any surgeries you have had.  · Any medical conditions you have.  · Whether you are pregnant or may be pregnant.  What are the risks?  Generally, this is a safe procedure. However, problems may occur, including:  · Infection.  · Bleeding.  · Allergic reactions to medicines.  · Damage to other structures or organs.  · A stone remaining in the common bile duct. The common bile duct carries bile from the gallbladder into the small intestine.  · A bile leak from the cyst duct that is clipped when your gallbladder is removed.  What happens before the procedure?  Staying hydrated  Follow instructions from your health care provider about hydration, which may include:  · Up to 2 hours before the procedure - you  may continue to drink clear liquids, such as water, clear fruit juice, black coffee, and plain tea.  Eating and drinking restrictions  Follow instructions from your health care provider about eating and drinking, which may include:  · 8 hours before the procedure - stop eating heavy meals or foods such as meat, fried foods, or fatty foods.  · 6 hours before the procedure - stop eating light meals or foods, such as toast or cereal.  · 6 hours before the procedure - stop drinking milk or drinks that contain milk.  · 2 hours before the procedure - stop drinking clear liquids.  Medicines  · Ask your health care provider about:  ¨ Changing or stopping your regular medicines. This is especially important if you are taking diabetes medicines or blood thinners.  ¨ Taking medicines such as aspirin and ibuprofen. These medicines can thin your blood. Do not take these medicines before your procedure if your health care provider instructs you not to.  · You may be given antibiotic medicine to help prevent infection.  General instructions  · Let your health care provider know if you develop a cold or an infection before surgery.  · Plan to have someone take you home from the hospital or clinic.  · Ask your health care provider how your surgical site will be marked or identified.  What happens during the procedure?  · To reduce your risk of infection:  ¨ Your health care team will wash or sanitize their hands.  ¨ Your skin will be washed with soap.  ¨ Hair may be removed from the surgical area.  · An IV tube may be inserted into one of your veins.  · You will be given one or more of the following:  ¨ A medicine to help you relax (sedative).  ¨ A medicine to make you fall asleep (general anesthetic).  · A breathing tube will be placed in your mouth.  · Your surgeon will make several small cuts (incisions) in your abdomen.  · The laparoscope will be inserted through one of the small incisions. The camera on the laparoscope will  send images to a TV screen (monitor) in the operating room. This lets your surgeon see inside your abdomen.  · Air-like gas will be pumped into your abdomen. This will expand your abdomen to give the surgeon more room to perform the surgery.  · Other tools that are needed for the procedure will be inserted through the other incisions. The gallbladder will be removed through one of the incisions.  · Your common bile duct may be examined. If stones are found in the common bile duct, they may be removed.  · After your gallbladder has been removed, the incisions will be closed with stitches (sutures), staples, or skin glue.  · Your incisions may be covered with a bandage (dressing).  The procedure may vary among health care providers and hospitals.  What happens after the procedure?  · Your blood pressure, heart rate, breathing rate, and blood oxygen level will be monitored until the medicines you were given have worn off.  · You will be given medicines as needed to control your pain.  · Do not drive for 24 hours if you were given a sedative.  This information is not intended to replace advice given to you by your health care provider. Make sure you discuss any questions you have with your health care provider.  Document Released: 12/18/2006 Document Revised: 07/09/2017 Document Reviewed: 06/05/2017  [employer] Interactive Patient Education © 2018 [employer]  You may remove her dressing after 48 hours and start showering.  Avoid any significant strenuous activity.  Make sure to call the office at [phone] for an appointment in 10-14 days.  If you have any significant questions feel free to call the office sooner.      General Anesthesia, Adult, Care After  These instructions provide you with information about caring for yourself after your procedure. Your health care provider may also give you more specific instructions. Your treatment has been planned according to current medical practices, but problems sometimes  occur. Call your health care provider if you have any problems or questions after your procedure.  What can I expect after the procedure?  After the procedure, it is common to have:  · Vomiting.  · A sore throat.  · Mental slowness.  It is common to feel:  · Nauseous.  · Cold or shivery.  · Sleepy.  · Tired.  · Sore or achy, even in parts of your body where you did not have surgery.  Follow these instructions at home:  For at least 24 hours after the procedure:  · Do not:  ¨ Participate in activities where you could fall or become injured.  ¨ Drive.  ¨ Use heavy machinery.  ¨ Drink alcohol.  ¨ Take sleeping pills or medicines that cause drowsiness.  ¨ Make important decisions or sign legal documents.  ¨ Take care of children on your own.  · Rest.  Eating and drinking  · If you vomit, drink water, juice, or soup when you can drink without vomiting.  · Drink enough fluid to keep your urine clear or pale yellow.  · Make sure you have little or no nausea before eating solid foods.  · Follow the diet recommended by your health care provider.  General instructions  · Have a responsible adult stay with you until you are awake and alert.  · Return to your normal activities as told by your health care provider. Ask your health care provider what activities are safe for you.  · Take over-the-counter and prescription medicines only as told by your health care provider.  · If you smoke, do not smoke without supervision.  · Keep all follow-up visits as told by your health care provider. This is important.  Contact a health care provider if:  · You continue to have nausea or vomiting at home, and medicines are not helpful.  · You cannot drink fluids or start eating again.  · You cannot urinate after 8-12 hours.  · You develop a skin rash.  · You have fever.  · You have increasing redness at the site of your procedure.  Get help right away if:  · You have difficulty breathing.  · You have chest pain.  · You have unexpected  bleeding.  · You feel that you are having a life-threatening or urgent problem.  This information is not intended to replace advice given to you by your health care provider. Make sure you discuss any questions you have with your health care provider.  Document Released: 03/26/2002 Document Revised: 05/22/2017 Document Reviewed: 12/01/2016  [employer] Interactive Patient Education © 2018 [employer]

## 2018-12-04 NOTE — PERIOPERATIVE NURSING NOTE
Abdomen soft non distended non tender hypo bowel sounds. Four abdominal dressings dry and intact. Report to [facility] RN.

## 2018-12-04 NOTE — ANESTHESIA PREPROCEDURE EVALUATION
Anesthesia ROS/MED HX    Anesthesia History - neg  Pulmonary    asthma   history of tobacco use   Sleep apnea and CPAP Compliant  Neuro/Psych    Headaches   Depression   Anxiety   Substance abuse  Cardiovascular   dyslipidemia   hypertension   ECG reviewed   Normal ECG  Hematological - neg  GI/Hepatic   GERD    Control: well controlled  Musculoskeletal   Osteoarthritis  Endo/Other   Diabetes  Body Habitus: Obese  ROS/MED HX Comments:    GI/Hepatic/Renal: gb dz   Renal Disease: Bladder issues      Past Surgical History:   Procedure Laterality Date   • COLONOSCOPY     • HYSTERECTOMY     • JOINT REPLACEMENT      right knee   • KNEE SURGERY Right    • REDUCTION MAMMAPLASTY Bilateral    • WISDOM TOOTH EXTRACTION         Physical Exam    Airway   Mallampati: IV   TM distance: >3 FB   Neck ROM: full  Cardiovascular - normal   Rhythm: regular   Rate: normal  Pulmonary - normal   clear to auscultation  Dental    Teeth Problems: missing    Anesthesia  Exam Comments:   Dental: r upper lost filling        Anesthesia Plan    Plan: general     Airway: video laryngoscope       patient smoked on day of surgery  ASA 2  Anesthetic plan and risks discussed with: patient

## 2018-12-04 NOTE — PERIOPERATIVE NURSING NOTE
Received patient s/p Laparoscopic Cholecystectomy via general anesthesia. Hob up 30 degrees. Encouraging active deep breathing. Abdomen soft non distended non tender hypo bowel sounds. Abdominal dressings dry and intact

## 2018-12-04 NOTE — OP NOTE
CHOLECYSTECTOMY LAPAROSCOPIC Procedure Note     Pre-opDx: CHOLECYSTECTOMY LAPAROSCOPIC Procedure Note     Pre-opDx: Chronic cholecystitis    Post op Dx: Chronic cholecystitis    Operation: Laparoscopic cholecystectomy      Post-op Diagnosis     * Calculus of gallbladder with cholecystitis without biliary obstruction, unspecified cholecystitis acuity [K80.10]    Surgeon: Chidi Jenkins MD     Assistants: [email or web address]    Anesthesia: General endotracheal anesthesia    ASA Class: 2      Procedure Details   The patient's abdomen was prepped and draped in normal sterile fashion.  A Feliciano trocar was placed in the supraumbilical port area because of her size and under direct visualization placed.  The abdomen was inflated.  Another trocar and 2 small grams were used.  The gallbladder was elevated.  Small cystic duct was clearly visualized going to come bile duct.  Cystic duct was triply clipped and divided.  Cystic artery triply clipped and divided.  Gallbladder was removed from the liver bed with no spillage of bile and excellent hemostasis.  It was brought out in the umbilicus.  The abdomen was deflated and all wounds closed in the usual fashion.    Findings:  There were no unusual findings    Estimated Blood Loss:  20 mL           Specimens:   ID Type Source Tests Collected by Time Destination   1 : gallbladder Tissue Gallbladder PATHOLOGY TISSUE EXAM Chidi Jenkins MD 12/4/2018 1042               Implants: * No implants in log *           Complications: None           Disposition: PACU - hemodynamically stable.           Condition: stable    Chidi Jenkins MD  Phone Number: [phone]    Chidi Jenkins MD  12/4/2018  10:50 AM

## 2018-12-05 LAB
CASE RPRT: NORMAL
CLINICAL INFO: NORMAL
PATH REPORT.FINAL DX SPEC: NORMAL
PATH REPORT.GROSS SPEC: NORMAL

## 2018-12-26 ENCOUNTER — TELEPHONE (OUTPATIENT)
Dept: FAMILY MEDICINE | Facility: CLINIC | Age: 53
End: 2018-12-26

## 2018-12-26 NOTE — TELEPHONE ENCOUNTER
Pt l/m @ 3297 requesting refills on losartan, lipitor, omeprazole, myrbetriq sent to pharmacy.    After reviewing chart, I confirmed with pharmacist that she has refills remaining on these medications and does not currently need them.

## 2019-01-21 ENCOUNTER — OFFICE VISIT (OUTPATIENT)
Dept: FAMILY MEDICINE | Facility: CLINIC | Age: 54
End: 2019-01-21
Payer: MEDICARE

## 2019-01-21 VITALS
SYSTOLIC BLOOD PRESSURE: 112 MMHG | WEIGHT: 293 LBS | BODY MASS INDEX: 44.41 KG/M2 | HEIGHT: 68 IN | DIASTOLIC BLOOD PRESSURE: 78 MMHG | TEMPERATURE: 97.6 F | HEART RATE: 94 BPM | OXYGEN SATURATION: 94 %

## 2019-01-21 DIAGNOSIS — F33.9 EPISODE OF RECURRENT MAJOR DEPRESSIVE DISORDER, UNSPECIFIED DEPRESSION EPISODE SEVERITY (CMS/HCC): ICD-10-CM

## 2019-01-21 DIAGNOSIS — F31.10: ICD-10-CM

## 2019-01-21 DIAGNOSIS — I10 ESSENTIAL HYPERTENSION: ICD-10-CM

## 2019-01-21 DIAGNOSIS — E66.813 CLASS 3 SEVERE OBESITY DUE TO EXCESS CALORIES WITH SERIOUS COMORBIDITY AND BODY MASS INDEX (BMI) OF 45.0 TO 49.9 IN ADULT (CMS/HCC): ICD-10-CM

## 2019-01-21 DIAGNOSIS — F41.9 ANXIETY: ICD-10-CM

## 2019-01-21 DIAGNOSIS — E78.00 HYPERCHOLESTEROLEMIA: ICD-10-CM

## 2019-01-21 DIAGNOSIS — N32.81 OVERACTIVE BLADDER: Primary | ICD-10-CM

## 2019-01-21 DIAGNOSIS — K21.9 GASTROESOPHAGEAL REFLUX DISEASE, ESOPHAGITIS PRESENCE NOT SPECIFIED: ICD-10-CM

## 2019-01-21 DIAGNOSIS — E66.01 CLASS 3 SEVERE OBESITY DUE TO EXCESS CALORIES WITH SERIOUS COMORBIDITY AND BODY MASS INDEX (BMI) OF 45.0 TO 49.9 IN ADULT (CMS/HCC): ICD-10-CM

## 2019-01-21 DIAGNOSIS — F43.10 PTSD (POST-TRAUMATIC STRESS DISORDER): ICD-10-CM

## 2019-01-21 DIAGNOSIS — F20.9 SCHIZOPHRENIA, UNSPECIFIED TYPE: ICD-10-CM

## 2019-01-21 PROCEDURE — 99214 OFFICE O/P EST MOD 30 MIN: CPT | Performed by: FAMILY MEDICINE

## 2019-01-21 RX ORDER — LOSARTAN POTASSIUM 50 MG/1
TABLET ORAL
COMMUNITY
Start: 2018-12-12 | End: 2019-01-21

## 2019-01-21 RX ORDER — MIRABEGRON 25 MG/1
25 TABLET, FILM COATED, EXTENDED RELEASE ORAL DAILY
Qty: 30 TABLET | Refills: 5 | Status: ON HOLD | COMMUNITY
Start: 2019-01-21 | End: 2019-07-02

## 2019-01-21 RX ORDER — LOSARTAN POTASSIUM 50 MG/1
50 TABLET ORAL DAILY
Qty: 30 TABLET | Refills: 5 | COMMUNITY
Start: 2019-01-21 | End: 2019-08-06 | Stop reason: SDUPTHER

## 2019-01-21 RX ORDER — OMEPRAZOLE 20 MG/1
20 CAPSULE, DELAYED RELEASE ORAL
Qty: 30 CAPSULE | Refills: 5 | COMMUNITY
Start: 2019-01-21 | End: 2019-02-26 | Stop reason: SDUPTHER

## 2019-01-21 RX ORDER — ATORVASTATIN CALCIUM 10 MG/1
10 TABLET, FILM COATED ORAL EVERY MORNING
Qty: 30 TABLET | Refills: 5 | COMMUNITY
Start: 2019-01-21 | End: 2019-02-26 | Stop reason: SDUPTHER

## 2019-01-21 ASSESSMENT — ENCOUNTER SYMPTOMS
FATIGUE: 0
BLOOD IN STOOL: 0
ABDOMINAL DISTENTION: 0
DIZZINESS: 0
FEVER: 0
TROUBLE SWALLOWING: 0
NAUSEA: 0
WHEEZING: 0
HEADACHES: 0
DIFFICULTY URINATING: 0
DIARRHEA: 0
SHORTNESS OF BREATH: 0
WEAKNESS: 0
ABDOMINAL PAIN: 0
COUGH: 0
UNEXPECTED WEIGHT CHANGE: 0
LIGHT-HEADEDNESS: 0
VOMITING: 0
CONSTIPATION: 0
ADENOPATHY: 0
PALPITATIONS: 0

## 2019-01-21 NOTE — PROGRESS NOTES
Daily Progress Note       Patient ID: Kar Nicholas is a 53 y.o. female.    HPI    53 year old presents for follow up visit.     Gained 10 pounds since her last visit. Continues to struggle with her weight. She knows she eats very poorly and her trouble is due to the increased calories. Considering possible gastric bypass surgery      Recently had Right TKR performed on 8/7/2018. Did home PT followed by out-patient PT.      Due to abdominal pain and elevated liver enzymes - RUQ U/S had been done. U/S showed gall bladder filled with stones. She was referred to Surgery. On 12/4/2018 Dr Jnekins performed a laparoscopic cholecystectomy.      Hx of Schizophrenia, Bipolar - Manic, PTSD and Anxiety - has been following up with Psychiatry - part of [facility]. Also doing group therapy Currently on abilify, seroquel and effexor XR - tolerating medications with no complaints. States doing well on current therapy. Last visit with psychiatry was on 1/8/2019. Follows up with therapist twice weekly - group/individual sessions.        Hypertension - currently on losartan 50 mg daily - tolerating with no complaints. Does not check BP at home      Hypercholesterolemia - currently on atorvastatin 10 mg qhs - tolerating with no complaints.      GERD - currently on omeprazole 20mg daily - tolerating with no complaints. Recently had been off the medication because she had no refills and states the reflux symptoms got significantly worse. States doing much better since being back on the medication      Migraines - reports well controlled at this time - usually will take advil with onset and take imitrex if significant. Reports last migraine was about 1-2 months ago. Had previously been 2-3x/week. Usually located frontal or occipital areas - throbbing sensation. + Light and sound sensitivity and Nausea. Relieved by laying in dark quiet room. + Aura     Overactive Bladder - currently on myrbetriq 25 mg daily -  reports improvement in symptoms since switching from oxybutynin. Now only waking up around 1x/night.      Nightmares - currently on prazosin with improvement in nightmares.      Hx of alcohol and drug abuse. States has been alcohol free since October 31, 2016 and drug free since November 11, 2016. Was on cocaine     The following have been reviewed and updated as appropriate in this visit:  Allergies  Meds  Problems       Review of Systems   Constitutional: Negative for fatigue, fever and unexpected weight change.   HENT: Negative for trouble swallowing.    Eyes: Negative for visual disturbance.   Respiratory: Negative for cough, shortness of breath and wheezing.    Cardiovascular: Negative for chest pain, palpitations and leg swelling.   Gastrointestinal: Negative for abdominal distention, abdominal pain, blood in stool, constipation, diarrhea, nausea and vomiting.   Endocrine: Negative for cold intolerance and heat intolerance.   Genitourinary: Negative for difficulty urinating.   Skin: Negative for rash.   Neurological: Negative for dizziness, syncope, weakness, light-headedness and headaches.   Hematological: Negative for adenopathy.             Current Outpatient Prescriptions   Medication Sig Dispense Refill   • ARIPiprazole (ABILIFY) 30 mg tablet Take 1 tablet (30 mg total) by mouth daily.     • atorvastatin (LIPITOR) 10 mg tablet Take 1 tablet (10 mg total) by mouth every morning. 30 tablet 5   • losartan (COZAAR) 50 mg tablet Take 1 tablet (50 mg total) by mouth daily. 30 tablet 5   • mirabegron (MYRBETRIQ) 25 mg ER tablet Take 1 tablet (25 mg total) by mouth daily. 30 tablet 5   • omeprazole (PriLOSEC) 20 mg capsule Take 1 capsule (20 mg total) by mouth daily before breakfast. 30 capsule 5   • prazosin (MINIPRESS) 1 mg capsule Take 1 mg by mouth daily. patietntakesa total of 3mg in the am     • prazosin (MINIPRESS) 2 mg capsule Take 2 capsules (4 mg total) by mouth nightly. (Patient taking differently:  Take 2 mg by mouth nightly.  ) 60 capsule 0   • QUEtiapine (SEROquel) 50 mg tablet Take one (1) tablet orally (by mouth) three times daily 90 tablet 0   • venlafaxine XR (EFFEXOR-XR) 150 mg 24 hr capsule Take 1 capsule (150 mg total) by mouth daily. 30 capsule 0   • QUEtiapine (SEROquel) 100 mg tablet Take 100 mg by mouth 3 (three) times a day.   90 tablet 0     No current facility-administered medications for this visit.      Past Medical History:   Diagnosis Date   • Alcoholism (CMS/[facility]) ([facility])     In Recovery  Since October 2016    • Anxiety    • Asthma     Remote   • Bipolar affect, depressed (CMS/[facility]) ([facility])    • Body mass index (BMI) 45.0-49.9, adult (CMS/[facility])    • Cocaine addiction (CMS/[facility]) ([facility])      None since November 2016   • Delayed emergence from general anesthesia    • Depression    • GERD (gastroesophageal reflux disease)    • Hypertension    • Lipid disorder    • Migraines    • Morbid obesity with BMI of 45.0-49.9, adult (CMS/[facility]) ([facility])    • Osteoarthritis     Knees   • Post-menopause    • Pre-diabetes    • Schizophrenia (CMS/[facility]) ([facility])    • Sciatica    • Sleep apnea     Uses CPAP occas- To bring CPAP Day of Sx     Family History   Problem Relation Age of Onset   • Colon cancer Mother    • Lung cancer Father    • Heart failure Brother    • Heart block Daughter      Past Surgical History:   Procedure Laterality Date   • CHOLECYSTECTOMY     • COLONOSCOPY     • HYSTERECTOMY     • JOINT REPLACEMENT      right knee   • KNEE SURGERY Right    • REDUCTION MAMMAPLASTY Bilateral    • WISDOM TOOTH EXTRACTION       Social History     Social History   • Marital status: Single     Spouse name: N/A   • Number of children: N/A   • Years of education: N/A     Occupational History   • Not on file.     Social History Main Topics   • Smoking status: Current Every Day Smoker     Packs/day: 0.25     Years: 30.00   • Smokeless tobacco: Never Used   • Alcohol use No      Comment: Recovering Alcoholic since 10/2016   • Drug  "use: Yes     Types: Cocaine      Comment: None since 11/2016   • Sexual activity: Defer     Other Topics Concern   • Not on file     Social History Narrative   • No narrative on file     Allergies   Allergen Reactions   • Shellfish Containing Products Angioedema     Other reaction(s): shellfish       Objective   No new labs.    Vitals:    01/21/19 1428   BP: 112/78   BP Location: Left upper arm   Patient Position: Sitting   Pulse: 94   Temp: 36.4 °C (97.6 °F)   TempSrc: Oral   SpO2: 94%   Weight: (!) 141 kg (310 lb)   Height: 1.727 m (5' 8\")         Physical Exam   Constitutional: She is oriented to person, place, and time. She appears well-developed and well-nourished.   HENT:   Head: Normocephalic and atraumatic.   Eyes: Conjunctivae and EOM are normal. Pupils are equal, round, and reactive to light.   Neck: Normal range of motion. Neck supple.   Cardiovascular: Normal rate, regular rhythm and normal heart sounds.    Pulmonary/Chest: Effort normal and breath sounds normal. No respiratory distress.   Abdominal: Soft. Bowel sounds are normal. She exhibits no distension. There is no tenderness.   Musculoskeletal: Normal range of motion. She exhibits no edema.   Neurological: She is alert and oriented to person, place, and time.   Skin: Skin is warm and dry.   Nursing note and vitals reviewed.      Assessment/Plan   Problem List Items Addressed This Visit     Gastroesophageal reflux disease    Current Assessment & Plan     Reflux precautions given         Relevant Medications    omeprazole (PriLOSEC) 20 mg capsule    Hypercholesterolemia    Current Assessment & Plan     Counseled patient about therapeutic lifestyle changes          Relevant Medications    atorvastatin (LIPITOR) 10 mg tablet    Obesity    Current Assessment & Plan     Counseled patient about diet and moderate exercise. Discussed the importance of monitoring calories. Follow well balanced diet with plenty of fruits and vegetables, whole grains, lean " proteins and low fat dairy products. Discussed decreasing intake of sugary and starch foods. Will follow up in 6 weeks to see if any improvement in her weight. She is considering evaluation for gastric bypass         Essential hypertension    Current Assessment & Plan     Well controlled. Cont low Na diet          Relevant Medications    losartan (COZAAR) 50 mg tablet    Depression    Overview     Follows up with Psychiatry         Anxiety    Overview     Follows up with Psychiatry         Bipolar disorder, manic (CMS/HCC) ([facility])    Overview     Follows up with Psychiatry         PTSD (post-traumatic stress disorder)    Overview     Follows up with Psychiatry         Schizophrenia (CMS/[facility]) ([facility])    Overview     Follows up with Psychiatry         Overactive bladder - Primary    Current Assessment & Plan     Improvement in symptoms since switching to Myrbetriq         Relevant Medications    mirabegron (MYRBETRIQ) 25 mg ER tablet          Prosper Gruloln MD  1/21/2019

## 2019-01-21 NOTE — ASSESSMENT & PLAN NOTE
Counseled patient about diet and moderate exercise. Discussed the importance of monitoring calories. Follow well balanced diet with plenty of fruits and vegetables, whole grains, lean proteins and low fat dairy products. Discussed decreasing intake of sugary and starch foods. Will follow up in 6 weeks to see if any improvement in her weight. She is considering evaluation for gastric bypass

## 2019-01-21 NOTE — PATIENT INSTRUCTIONS
Patient Education     Calorie Counting for Weight Loss  Calories are units of energy. Your body needs a certain amount of calories from food to keep you going throughout the day. When you eat more calories than your body needs, your body stores the extra calories as fat. When you eat fewer calories than your body needs, your body burns fat to get the energy it needs.  Calorie counting means keeping track of how many calories you eat and drink each day. Calorie counting can be helpful if you need to lose weight. If you make sure to eat fewer calories than your body needs, you should lose weight. Ask your health care provider what a healthy weight is for you.  For calorie counting to work, you will need to eat the right number of calories in a day in order to lose a healthy amount of weight per week. A dietitian can help you determine how many calories you need in a day and will give you suggestions on how to reach your calorie goal.  · A healthy amount of weight to lose per week is usually 1-2 lb (0.5-0.9 kg). This usually means that your daily calorie intake should be reduced by 500-750 calories.  · Eating 1,200 - 1,500 calories per day can help most women lose weight.  · Eating 1,500 - 1,800 calories per day can help most men lose weight.  What is my plan?  My goal is to have __________ calories per day.  If I have this many calories per day, I should lose around __________ pounds per week.  What do I need to know about calorie counting?  In order to meet your daily calorie goal, you will need to:  · Find out how many calories are in each food you would like to eat. Try to do this before you eat.  · Decide how much of the food you plan to eat.  · Write down what you ate and how many calories it had. Doing this is called keeping a food log.  To successfully lose weight, it is important to balance calorie counting with a healthy lifestyle that includes regular activity. Aim for 150 minutes of moderate exercise (such  as walking) or 75 minutes of vigorous exercise (such as running) each week.  Where do I find calorie information?    The number of calories in a food can be found on a Nutrition Facts label. If a food does not have a Nutrition Facts label, try to look up the calories online or ask your dietitian for help.  Remember that calories are listed per serving. If you choose to have more than one serving of a food, you will have to multiply the calories per serving by the amount of servings you plan to eat. For example, the label on a package of bread might say that a serving size is 1 slice and that there are 90 calories in a serving. If you eat 1 slice, you will have eaten 90 calories. If you eat 2 slices, you will have eaten 180 calories.  How do I keep a food log?  Immediately after each meal, record the following information in your food log:  · What you ate. Don't forget to include toppings, sauces, and other extras on the food.  · How much you ate. This can be measured in cups, ounces, or number of items.  · How many calories each food and drink had.  · The total number of calories in the meal.  Keep your food log near you, such as in a small notebook in your pocket, or use a mobile [place] or website. Some programs will calculate calories for you and show you how many calories you have left for the day to meet your goal.  What are some calorie counting tips?  · Use your calories on foods and drinks that will fill you up and not leave you hungry:  ¨ Some examples of foods that fill you up are nuts and nut butters, vegetables, lean proteins, and high-fiber foods like whole grains. High-fiber foods are foods with more than 5 g fiber per serving.  ¨ Drinks such as sodas, specialty coffee drinks, alcohol, and juices have a lot of calories, yet do not fill you up.  · Eat nutritious foods and avoid empty calories. Empty calories are calories you get from foods or beverages that do not have many vitamins or protein, such as  "candy, sweets, and soda. It is better to have a nutritious high-calorie food (such as an avocado) than a food with few nutrients (such as a bag of chips).  · Know how many calories are in the foods you eat most often. This will help you calculate calorie counts faster.  · Pay attention to calories in drinks. Low-calorie drinks include water and unsweetened drinks.  · Pay attention to nutrition labels for \"low fat\" or \"fat free\" foods. These foods sometimes have the same amount of calories or more calories than the full fat versions. They also often have added sugar, starch, or salt, to make up for flavor that was removed with the fat.  · Find a way of tracking calories that works for you. Get creative. Try different apps or programs if writing down calories does not work for you.  What are some portion control tips?  · Know how many calories are in a serving. This will help you know how many servings of a certain food you can have.  · Use a measuring cup to measure serving sizes. You could also try weighing out portions on a kitchen scale. With time, you will be able to estimate serving sizes for some foods.  · Take some time to put servings of different foods on your favorite plates, bowls, and cups so you know what a serving looks like.  · Try not to eat straight from a bag or box. Doing this can lead to overeating. Put the amount you would like to eat in a cup or on a plate to make sure you are eating the right portion.  · Use smaller plates, glasses, and bowls to prevent overeating.  · Try not to multitask (for example, watch TV or use your computer) while eating. If it is time to eat, sit down at a table and enjoy your food. This will help you to know when you are full. It will also help you to be aware of what you are eating and how much you are eating.  What are tips for following this plan?  Reading food labels  · Check the calorie count compared to the serving size. The serving size may be smaller than what " you are used to eating.  · Check the source of the calories. Make sure the food you are eating is high in vitamins and protein and low in saturated and trans fats.  Shopping  · Read nutrition labels while you shop. This will help you make healthy decisions before you decide to purchase your food.  · Make a grocery list and stick to it.  Cooking  · Try to cook your favorite foods in a healthier way. For example, try baking instead of frying.  · Use low-fat dairy products.  Meal planning  · Use more fruits and vegetables. Half of your plate should be fruits and vegetables.  · Include lean proteins like poultry and fish.  How do I count calories when eating out?  · Ask for smaller portion sizes.  · Consider sharing an entree and sides instead of getting your own entree.  · If you get your own entree, eat only half. Ask for a box at the beginning of your meal and put the rest of your entree in it so you are not tempted to eat it.  · If calories are listed on the menu, choose the lower calorie options.  · Choose dishes that include vegetables, fruits, whole grains, low-fat dairy products, and lean protein.  · Choose items that are boiled, broiled, grilled, or steamed. Stay away from items that are buttered, battered, fried, or served with cream sauce. Items labeled “crispy” are usually fried, unless stated otherwise.  · Choose water, low-fat milk, unsweetened iced tea, or other drinks without added sugar. If you want an alcoholic beverage, choose a lower calorie option such as a glass of wine or light beer.  · Ask for dressings, sauces, and syrups on the side. These are usually high in calories, so you should limit the amount you eat.  · If you want a salad, choose a garden salad and ask for grilled meats. Avoid extra toppings like wylie, cheese, or fried items. Ask for the dressing on the side, or ask for olive oil and vinegar or lemon to use as dressing.  · Estimate how many servings of a food you are given. For  example, a serving of cooked rice is ½ cup or about the size of half a baseball. Knowing serving sizes will help you be aware of how much food you are eating at restaurants. The list below tells you how big or small some common portion sizes are based on everyday objects:  ¨ 1 oz--4 stacked dice.  ¨ 3 oz--1 deck of cards.  ¨ 1 tsp--1 die.  ¨ 1 Tbsp--½ a ping-pong ball.  ¨ 2 Tbsp--1 ping-pong ball.  ¨ ½ cup--½ baseball.  ¨ 1 cup--1 baseball.  Summary  · Calorie counting means keeping track of how many calories you eat and drink each day. If you eat fewer calories than your body needs, you should lose weight.  · A healthy amount of weight to lose per week is usually 1-2 lb (0.5-0.9 kg). This usually means reducing your daily calorie intake by 500-750 calories.  · The number of calories in a food can be found on a Nutrition Facts label. If a food does not have a Nutrition Facts label, try to look up the calories online or ask your dietitian for help.  · Use your calories on foods and drinks that will fill you up, and not on foods and drinks that will leave you hungry.  · Use smaller plates, glasses, and bowls to prevent overeating.  This information is not intended to replace advice given to you by your health care provider. Make sure you discuss any questions you have with your health care provider.  Document Released: 12/18/2006 Document Revised: 11/17/2017 Document Reviewed: 11/17/2017  Elsevier Interactive Patient Education © 2017 [employer].

## 2019-01-23 RX ORDER — VENLAFAXINE HYDROCHLORIDE 75 MG/1
75 CAPSULE, EXTENDED RELEASE ORAL DAILY
COMMUNITY
End: 2019-04-15

## 2019-01-25 ENCOUNTER — TELEPHONE (OUTPATIENT)
Dept: FAMILY MEDICINE | Facility: CLINIC | Age: 54
End: 2019-01-25

## 2019-01-25 NOTE — TELEPHONE ENCOUNTER
L/m on pt vm advising her of message, I left [facility] phone number and told her to call if she wants the other 2 numbers.  I did elplain on message that this a a pretty intense process, not something that you walk in and have doen.  Also advised her to call her insurance company prior, because there may be out of pocket expenses.

## 2019-01-25 NOTE — TELEPHONE ENCOUNTER
Pt called again. Wants to have surgery in Feb. Seemed very confused about process. I told her we do not do the surgery, that she would have to schedule with surgeon. Please advise name of surgeon so she can schedule consult appt. States she flucuates between 300 and 330lbs, cannot lose weight, gaining. [phone]. thanks

## 2019-01-25 NOTE — TELEPHONE ENCOUNTER
Patient has an appointment on 3/4 to discuss gastric bypass. She has already discussed this with you and doesn't want to wait another month.  She has tried to lose weight without success and wants to see surgeon regarding the bypass.  I explained the surgeons also require weight loss but she would like a call back, please

## 2019-01-25 NOTE — TELEPHONE ENCOUNTER
Tell her that she may have to check with her insurance about coverage but we can refer her to a few options     [facility]- [phone]    [facility] [place] - [phone]    [facility] - [phone]

## 2019-02-15 ENCOUNTER — TELEPHONE (OUTPATIENT)
Dept: FAMILY MEDICINE | Facility: CLINIC | Age: 54
End: 2019-02-15

## 2019-02-15 NOTE — TELEPHONE ENCOUNTER
LVM for patient informing her that we cannot locate the pharmacy she stated.    All meds were sent in January to [facility] with 5 additional refills. Patient can have her new pharmacy call [facility] to transfer the med refills to them.     Detailed VM with this info left on patient's cell #. The # left in the original message, goes to another persons VM.

## 2019-02-15 NOTE — TELEPHONE ENCOUNTER
Patient called for refill of    Omeprazole  Losartan  Atorvastatin      Pharmacy in [facility] in [place]- reports this is where the meds were sent last time, patient does not know the name.    Patient # [phone] ext [phone]    dk

## 2019-02-19 ENCOUNTER — TELEPHONE (OUTPATIENT)
Dept: FAMILY MEDICINE | Facility: CLINIC | Age: 54
End: 2019-02-19

## 2019-02-19 NOTE — TELEPHONE ENCOUNTER
Maame called again about her RXs- has correct information about new pharmacy, would like them sent there (was not understanding she could/should call her previous pharmacy)    [facility]  Ph- fax-    Omeprazole, Losartan, Atorvastatin    dk

## 2019-02-19 NOTE — TELEPHONE ENCOUNTER
Called [facility] and they will contact [facility] to have the 4 RXs transferred to them.

## 2019-04-04 ENCOUNTER — TRANSCRIBE ORDERS (OUTPATIENT)
Dept: REGISTRATION | Age: 54
End: 2019-04-04

## 2019-04-04 ENCOUNTER — APPOINTMENT (OUTPATIENT)
Dept: LAB | Age: 54
End: 2019-04-04
Attending: PSYCHIATRY & NEUROLOGY
Payer: MEDICARE

## 2019-04-04 DIAGNOSIS — E88.810 METABOLIC SYNDROME: Primary | ICD-10-CM

## 2019-04-04 DIAGNOSIS — Z79.899 OTHER LONG TERM (CURRENT) DRUG THERAPY: ICD-10-CM

## 2019-04-04 DIAGNOSIS — Z09 ENCOUNTER FOR FOLLOW-UP EXAMINATION AFTER COMPLETED TREATMENT FOR CONDITIONS OTHER THAN MALIGNANT NEOPLASM: ICD-10-CM

## 2019-04-04 DIAGNOSIS — E88.810 METABOLIC SYNDROME: ICD-10-CM

## 2019-04-04 LAB
25(OH)D3 SERPL-MCNC: 12 NG/ML (ref 30–100)
ALBUMIN SERPL-MCNC: 3.8 G/DL (ref 3.4–5)
ALP SERPL-CCNC: 78 IU/L (ref 35–126)
ALT SERPL-CCNC: 15 IU/L (ref 11–54)
ANION GAP SERPL CALC-SCNC: 11 MEQ/L (ref 3–15)
AST SERPL-CCNC: 16 IU/L (ref 15–41)
BASOPHILS # BLD: 0.04 K/UL (ref 0.01–0.1)
BASOPHILS NFR BLD: 0.9 %
BILIRUB SERPL-MCNC: 0.3 MG/DL (ref 0.3–1.2)
BUN SERPL-MCNC: 7 MG/DL (ref 8–20)
CALCIUM SERPL-MCNC: 9.4 MG/DL (ref 8.9–10.3)
CHLORIDE SERPL-SCNC: 106 MEQ/L (ref 98–109)
CHOLEST SERPL-MCNC: 179 MG/DL
CO2 SERPL-SCNC: 25 MEQ/L (ref 22–32)
CREAT SERPL-MCNC: 0.8 MG/DL
DIFFERENTIAL METHOD BLD: NORMAL
EOSINOPHIL # BLD: 0.11 K/UL (ref 0.04–0.36)
EOSINOPHIL NFR BLD: 2.4 %
ERYTHROCYTE [DISTWIDTH] IN BLOOD BY AUTOMATED COUNT: 13.3 % (ref 11.7–14.4)
EST. AVERAGE GLUCOSE BLD GHB EST-MCNC: 131 MG/DL
GFR SERPL CREATININE-BSD FRML MDRD: >60 ML/MIN/1.73M*2
GLUCOSE P FAST SERPL-MCNC: 86 MG/DL (ref 70–99)
GLUCOSE SERPL-MCNC: 86 MG/DL (ref 70–99)
HBA1C MFR BLD HPLC: 6.2 %
HCT VFR BLDCO AUTO: 40.6 %
HDLC SERPL-MCNC: 62 MG/DL
HDLC SERPL: 2.9 {RATIO}
HGB BLD-MCNC: 13 G/DL
IMM GRANULOCYTES # BLD AUTO: 0.01 K/UL (ref 0–0.08)
IMM GRANULOCYTES NFR BLD AUTO: 0.2 %
LDLC SERPL CALC-MCNC: 101 MG/DL
LYMPHOCYTES # BLD: 1.24 K/UL (ref 1.2–3.5)
LYMPHOCYTES NFR BLD: 27.3 %
MCH RBC QN AUTO: 28.1 PG (ref 28–33.2)
MCHC RBC AUTO-ENTMCNC: 32 G/DL (ref 32.2–35.5)
MCV RBC AUTO: 87.9 FL (ref 83–98)
MONOCYTES # BLD: 0.39 K/UL (ref 0.28–0.8)
MONOCYTES NFR BLD: 8.6 %
NEUTROPHILS # BLD: 2.76 K/UL (ref 1.7–7)
NEUTS SEG NFR BLD: 60.6 %
NONHDLC SERPL-MCNC: 117 MG/DL
NRBC BLD-RTO: 0 %
PDW BLD AUTO: 12.2 FL (ref 9.4–12.3)
PLATELET # BLD AUTO: 239 K/UL
POTASSIUM SERPL-SCNC: 4.5 MEQ/L (ref 3.6–5.1)
PROT SERPL-MCNC: 6.5 G/DL (ref 6–8.2)
RBC # BLD AUTO: 4.62 M/UL (ref 3.93–5.22)
SODIUM SERPL-SCNC: 142 MEQ/L (ref 136–144)
TRIGL SERPL-MCNC: 79 MG/DL (ref 30–149)
TSH SERPL DL<=0.05 MIU/L-ACNC: 0.91 MIU/L (ref 0.34–5.6)
WBC # BLD AUTO: 4.55 K/UL

## 2019-04-04 PROCEDURE — 84450 TRANSFERASE (AST) (SGOT): CPT

## 2019-04-04 PROCEDURE — 36415 COLL VENOUS BLD VENIPUNCTURE: CPT

## 2019-04-04 PROCEDURE — 80053 COMPREHEN METABOLIC PANEL: CPT

## 2019-04-04 PROCEDURE — 83036 HEMOGLOBIN GLYCOSYLATED A1C: CPT

## 2019-04-04 PROCEDURE — 84443 ASSAY THYROID STIM HORMONE: CPT

## 2019-04-04 PROCEDURE — 82306 VITAMIN D 25 HYDROXY: CPT

## 2019-04-04 PROCEDURE — 80061 LIPID PANEL: CPT

## 2019-04-04 PROCEDURE — 85025 COMPLETE CBC W/AUTO DIFF WBC: CPT

## 2019-04-15 ENCOUNTER — OFFICE VISIT (OUTPATIENT)
Dept: FAMILY MEDICINE | Facility: CLINIC | Age: 54
End: 2019-04-15
Payer: MEDICARE

## 2019-04-15 VITALS
WEIGHT: 293 LBS | TEMPERATURE: 98 F | BODY MASS INDEX: 44.41 KG/M2 | HEART RATE: 97 BPM | OXYGEN SATURATION: 98 % | SYSTOLIC BLOOD PRESSURE: 108 MMHG | HEIGHT: 68 IN | DIASTOLIC BLOOD PRESSURE: 64 MMHG | RESPIRATION RATE: 17 BRPM

## 2019-04-15 DIAGNOSIS — Z12.39 BREAST CANCER SCREENING: ICD-10-CM

## 2019-04-15 DIAGNOSIS — Z12.12 ENCOUNTER FOR COLORECTAL CANCER SCREENING: ICD-10-CM

## 2019-04-15 DIAGNOSIS — E66.813 CLASS 3 SEVERE OBESITY DUE TO EXCESS CALORIES WITHOUT SERIOUS COMORBIDITY WITH BODY MASS INDEX (BMI) OF 45.0 TO 49.9 IN ADULT (CMS/HCC): Primary | ICD-10-CM

## 2019-04-15 DIAGNOSIS — Z12.11 ENCOUNTER FOR COLORECTAL CANCER SCREENING: ICD-10-CM

## 2019-04-15 DIAGNOSIS — E66.01 CLASS 3 SEVERE OBESITY DUE TO EXCESS CALORIES WITHOUT SERIOUS COMORBIDITY WITH BODY MASS INDEX (BMI) OF 45.0 TO 49.9 IN ADULT (CMS/HCC): Primary | ICD-10-CM

## 2019-04-15 DIAGNOSIS — R73.03 BORDERLINE TYPE 2 DIABETES MELLITUS: ICD-10-CM

## 2019-04-15 PROCEDURE — 99213 OFFICE O/P EST LOW 20 MIN: CPT | Performed by: FAMILY MEDICINE

## 2019-04-15 RX ORDER — QUETIAPINE FUMARATE 300 MG/1
150 TABLET, FILM COATED ORAL 2 TIMES DAILY
COMMUNITY
Start: 2019-03-07 | End: 2019-08-19

## 2019-04-15 ASSESSMENT — ENCOUNTER SYMPTOMS
LIGHT-HEADEDNESS: 0
ADENOPATHY: 0
NAUSEA: 0
HEADACHES: 0
DIFFICULTY URINATING: 0
FEVER: 0
FATIGUE: 0
PALPITATIONS: 0
DIARRHEA: 0
ABDOMINAL PAIN: 0
WEAKNESS: 0
CONSTIPATION: 0
TROUBLE SWALLOWING: 0
COUGH: 0
BLOOD IN STOOL: 0
ABDOMINAL DISTENTION: 0
VOMITING: 0
UNEXPECTED WEIGHT CHANGE: 0
DIZZINESS: 0
SHORTNESS OF BREATH: 0
WHEEZING: 0

## 2019-04-15 NOTE — ASSESSMENT & PLAN NOTE
Counseled patient about diet and moderate exercise. Discussed the importance of monitoring calories. Follow well balanced diet with plenty of fruits and vegetables, whole grains, lean proteins and low fat dairy products. Discussed decreasing intake of sugary and starch foods. She would like further information about bariatric surgery - information was given to patient. Referred her to [facility]

## 2019-04-15 NOTE — PROGRESS NOTES
Daily Progress Note       Patient ID: Kar Nicholas is a 53 y.o. female.    HPI    53 year old presents for follow up visit.      She has been struggling with her weight for many years. She knows she does eat poorly and knows she nees to work on her portions. She had mentioned the consideration of gastric bypass surgery to me when I saw her last on 1/21 - she been working on losing weight since that time. She has gained 1 pound since the last visit. She has not been exercising       The following have been reviewed and updated as appropriate in this visit:  Allergies  Meds  Problems       Review of Systems   Constitutional: Negative for fatigue, fever and unexpected weight change.   HENT: Negative for trouble swallowing.    Eyes: Negative for visual disturbance.   Respiratory: Negative for cough, shortness of breath and wheezing.    Cardiovascular: Negative for chest pain, palpitations and leg swelling.   Gastrointestinal: Negative for abdominal distention, abdominal pain, blood in stool, constipation, diarrhea, nausea and vomiting.   Endocrine: Negative for cold intolerance and heat intolerance.   Genitourinary: Negative for difficulty urinating.   Skin: Negative for rash.   Neurological: Negative for dizziness, syncope, weakness, light-headedness and headaches.   Hematological: Negative for adenopathy.             Current Outpatient Prescriptions   Medication Sig Dispense Refill   • atorvastatin (LIPITOR) 10 mg tablet TAKE 1 TABLET BY MOUTH DAILY 30 tablet 1   • losartan (COZAAR) 50 mg tablet Take 1 tablet (50 mg total) by mouth daily. 30 tablet 5   • omeprazole (PriLOSEC) 20 mg capsule TAKE 1 CAPSULE BY MOUTH DAILY BEFORE BREAKFAST 30 capsule 1   • prazosin (MINIPRESS) 1 mg capsule Take 1 mg by mouth 2 (two) times a day.       • QUEtiapine (SEROquel) 300 mg tablet 150 mg 2 (two) times a day.       • venlafaxine XR (EFFEXOR-XR) 150 mg 24 hr capsule Take 1 capsule (150 mg total) by mouth daily. 30 capsule 0    • mirabegron (MYRBETRIQ) 25 mg ER tablet Take 1 tablet (25 mg total) by mouth daily. 30 tablet 5     No current facility-administered medications for this visit.      Past Medical History:   Diagnosis Date   • Alcoholism (CMS/[facility]) ([facility])     In Recovery  Since October 2016    • Anxiety    • Asthma     Remote   • Bipolar affect, depressed (CMS/[facility]) ([facility])    • Body mass index (BMI) 45.0-49.9, adult (CMS/[facility])    • Cocaine addiction (CMS/[facility]) ([facility])      None since November 2016   • Delayed emergence from general anesthesia    • Depression    • GERD (gastroesophageal reflux disease)    • Hypertension    • Lipid disorder    • Migraines    • Morbid obesity with BMI of 45.0-49.9, adult (CMS/[facility]) ([facility])    • Osteoarthritis     Knees   • Post-menopause    • Pre-diabetes    • Schizophrenia (CMS/[facility]) ([facility])    • Sciatica    • Sleep apnea     Uses CPAP occas- To bring CPAP Day of Sx     Family History   Problem Relation Age of Onset   • Colon cancer Mother    • Lung cancer Father    • Heart failure Brother    • Heart block Daughter      Past Surgical History:   Procedure Laterality Date   • CHOLECYSTECTOMY     • COLONOSCOPY     • HYSTERECTOMY     • JOINT REPLACEMENT      right knee   • KNEE SURGERY Right    • REDUCTION MAMMAPLASTY Bilateral    • WISDOM TOOTH EXTRACTION       Social History     Social History   • Marital status: Single     Spouse name: N/A   • Number of children: N/A   • Years of education: N/A     Occupational History   • Not on file.     Social History Main Topics   • Smoking status: Current Every Day Smoker     Packs/day: 0.25     Years: 30.00   • Smokeless tobacco: Never Used   • Alcohol use No      Comment: Recovering Alcoholic since 10/2016   • Drug use: No      Comment: None since 11/2016   • Sexual activity: Defer     Other Topics Concern   • Not on file     Social History Narrative   • No narrative on file     Allergies   Allergen Reactions   • Shellfish Containing Products Angioedema     Other  reaction(s): shellfish       Objective     Hemoglobin A1c     Ref Range & Units 4/4/19 1000 Comments   Hemoglobin A1C <5.7 % 6.2     In the absence of an established diagnosis of Diabetes Mellitus, HbA1c levels between 5.7% and 6.4% are indicative of increased risk for developing Diabetes(Pre-Diabetes). Levels of 6.5% or greater are diagnostic for Diabetes Mellitus.   Estimated Ave Glucose mg/dL 131    Estimate of average glucose concentration continuously over 24 hours for previous 2 to 3 months(Per ADA Recommendation).              TSH    Ref Range & Units 4/4/19 1000   TSH 0.34 - 5.60 mIU/L 0.91                  Comprehensive metabolic panel    Ref Range & Units 4/4/19 1000   Sodium 136 - 144 mEQ/L 142    Potassium 3.6 - 5.1 mEQ/L 4.5    Chloride 98 - 109 mEQ/L 106    CO2 22 - 32 mEQ/L 25    BUN 8 - 20 mg/dL 7     Creatinine 0.6 - 1.1 mg/dL 0.8    Glucose 70 - 99 mg/dL 86    Calcium 8.9 - 10.3 mg/dL 9.4    AST (SGOT) 15 - 41 IU/L 16    ALT (SGPT) 11 - 54 IU/L 15    Alkaline Phosphatase 35 - 126 IU/L 78    Total Protein 6.0 - 8.2 g/dL 6.5    Albumin 3.4 - 5.0 g/dL 3.8    Bilirubin, Total 0.3 - 1.2 mg/dL 0.3    eGFR >=60.0 mL/min/1.73m*2 >60.0    Anion Gap 3 - 15 mEQ/L 11                  Lipid panel    Ref Range & Units 4/4/19 1000 Comments   Triglycerides 30 - 149 mg/dL 79     Cholesterol <=200 mg/dL 179     HDL >=55 mg/dL 62  2001 NCEP GUIDELINES   <40 mg/dL Low   >=60 mg/dL High   LDL Calculated <=100 mg/dL 101   ATP III GUIDELINES   Optimal: <100 mg/dL   Near/Above Optimal: 100-129 mg/dL   Borderline High: 130-159 mg/dL   High: 160-189 mg/dL   Very High: >190 mg/dL    Non-HDL, Calculated mg/dL 117     RISK <=5.0 2.9  RISK FEMALE ([place] [facility]UDY DATA)   1/2 Average 3.3   Average 4.4   2X Average 7.1   3X Average 11.0                  Glucose, fasting    Ref Range & Units 4/4/19 1000   Glucose, Fasting 70 - 99 mg/dL 86                   Vitamin D 25 hydroxy    Ref Range & Units 4/4/19 1000 Comments   Vit D,  "25-Hydroxy 30 - 100 ng/mL 12     INTERPRETATION   VITAMIN D STATUS   25 OH VITAMIN D     Deficiency          <20 ng/mL     Insufficiency     20-29 ng/mL     Sufficiency       ng/mL     Toxicity           >100 ng/mL   This test measures a total of both 25 OH D2 and 25 OH D3.                 CBC    Ref Range & Units 4/4/19 1000   WBC 3.80 - 10.50 K/uL 4.55    RBC 3.93 - 5.22 M/uL 4.62    Hemoglobin 11.8 - 15.7 g/dL 13.0    Hematocrit 35.0 - 45.0 % 40.6    MCV 83.0 - 98.0 fL 87.9    MCH 28.0 - 33.2 pg 28.1    MCHC 32.2 - 35.5 g/dL 32.0     RDW 11.7 - 14.4 % 13.3    Platelets 150 - 369 K/uL 239    MPV 9.4 - 12.3 fL 12.2                Diff Count    Ref Range & Units 4/4/19 1000   Differential Type  Auto    nRBC <=0.0 % 0.0    Immature Granulocytes % 0.2    Neutrophils % 60.6    Lymphocytes % 27.3    Monocytes % 8.6    Eosinophils % 2.4    Basophils % 0.9    Immature Granulocytes, Absolute 0.00 - 0.08 K/uL 0.01    Neutrophils, Absolute 1.70 - 7.00 K/uL 2.76    Lymphocytes, Absolute 1.20 - 3.50 K/uL 1.24    Monocytes, Absolute 0.28 - 0.80 K/uL 0.39    Eosinophils, Absolute 0.04 - 0.36 K/uL 0.11    Basophils, Absolute 0.01 - 0.10 K/uL 0.04                Vitals:    04/15/19 1046   BP: 108/64   BP Location: Left upper arm   Patient Position: Sitting   Pulse: 97   Resp: 17   Temp: 36.7 °C (98 °F)   TempSrc: Oral   SpO2: 98%   Weight: (!) 141 kg (311 lb)   Height: 1.727 m (5' 8\")         Physical Exam   Constitutional: She is oriented to person, place, and time. She appears well-developed and well-nourished.   HENT:   Head: Normocephalic and atraumatic.   Eyes: Pupils are equal, round, and reactive to light. Conjunctivae and EOM are normal.   Neck: Normal range of motion. Neck supple.   Cardiovascular: Normal rate, regular rhythm and normal heart sounds.    Pulmonary/Chest: Effort normal and breath sounds normal. No respiratory distress.   Abdominal: Soft. Bowel sounds are normal. She exhibits no distension. There is no " tenderness.   Musculoskeletal: Normal range of motion. She exhibits no edema.   Neurological: She is alert and oriented to person, place, and time.   Skin: Skin is warm and dry.   Nursing note and vitals reviewed.      Assessment/Plan   Problem List Items Addressed This Visit     Obesity - Primary    Current Assessment & Plan     Counseled patient about diet and moderate exercise. Discussed the importance of monitoring calories. Follow well balanced diet with plenty of fruits and vegetables, whole grains, lean proteins and low fat dairy products. Discussed decreasing intake of sugary and starch foods. She would like further information about bariatric surgery - information was given to patient. Referred [facility] to [facility]              Borderline type 2 diabetes mellitus    Current Assessment & Plan     Counseled patient about diabetes and disease process. Stressed importance of improving diet, starting exercise and weight loss. Counseled patient to decrease her carb intake - bread, rice, pasta, starches, juice, soda and sweets.         Relevant Orders    Hemoglobin A1c      Other Visit Diagnoses     Breast cancer screening        Relevant Orders    BI SCREENING MAMMOGRAM BILATERAL    Encounter for colorectal cancer screening        Relevant Orders    Direct Access Colonoscopy KAUSHAL Grullon MD  4/15/2019

## 2019-04-15 NOTE — ASSESSMENT & PLAN NOTE
Counseled patient about diabetes and disease process. Stressed importance of improving diet, starting exercise and weight loss. Counseled patient to decrease her carb intake - bread, rice, pasta, starches, juice, soda and sweets.

## 2019-04-15 NOTE — PATIENT INSTRUCTIONS
Patient Education     Bariatric Surgery Information  Bariatric surgery, also called weight loss surgery, is a procedure that helps you lose weight. You may consider or your health care provider may suggest bariatric surgery if:  · You are severely obese and have been unable to lose weight through diet and exercise.  · You have health problems related to obesity, such as:  ¨ Type 2 diabetes.  ¨ Heart disease.  ¨ Lung disease.  How does bariatric surgery help me lose weight?  Bariatric surgery helps you lose weight by decreasing how much food your body absorbs. This is done by closing off part of your stomach to make it smaller. This restricts the amount of food your stomach can hold. Bariatric surgery can also change your body’s regular digestive process, so that food bypasses the parts of your body that absorb calories and nutrients.  If you decide to have bariatric surgery, it is important to continue to eat a healthy diet and exercise regularly after the surgery.  What are the different kinds of bariatric surgery?  There are two kinds of bariatric surgeries:  · Restrictive surgeries make your stomach smaller. They do not change your digestive process. The smaller the size of your new stomach, the less food you can eat. There are different types of restrictive surgeries.  · Malabsorptive surgeries both make your stomach smaller and alter your digestive process so that your body processes less calories and nutrients. These are the most common kind of bariatric surgery. There are different types of malabsorptive surgeries.  What are the different types of restrictive surgery?  Adjustable Gastric Banding  In this procedure, an inflatable band is placed around your stomach near the upper end. This makes the passageway for food into the rest of your stomach much smaller. The band can be adjusted, making it tighter or looser, by filling it with salt solution. Your surgeon can adjust the band based on how are you feeling  and how much weight you are losing. The band can be removed in the future.  Vertical Banded Gastroplasty  In this procedure, staples are used to separate your stomach into two parts, a small upper pouch and a bigger lower pouch. This decreases how much food you can eat.  Sleeve Gastrectomy  In this procedure, your stomach is made smaller. This is done by surgically removing a large part of your stomach. When your stomach is smaller, you feel full more quickly and reduce how much you eat.  What are the different types of malabsorptive surgery?  Sydnie-en-Y Gastric Bypass (RGB)  This is the most common weight loss surgery. In this procedure, a small stomach pouch is created in the upper part of your stomach. Next, this small stomach pouch is attached directly to the middle part of your small intestine. The farther down your small intestine the new connection is made, the fewer calories and nutrients you will absorb.  Biliopancreatic Diversion with Duodenal Switch (BPD/DS)  This is a multi-step procedure. In this procedure, a large part of your stomach is removed, making your stomach smaller. Next, this smaller stomach is attached to the lower part of your small intestine. Like the RGB surgery, you absorb fewer calories and nutrients the farther down your small intestine the attachment is made.  What are the risks of bariatric surgery?  As with any surgical procedure, each type of bariatric surgery has its own risks. These risks also depend on your age, your overall health, and any other medical conditions you may have. When deciding on bariatric surgery, it is very important to:  · Talk to your health care provider and choose the surgery that is best for you.  · Ask your health care provider about specific risks for the surgery you choose.  Where to find more information:  · American Society for Metabolic & Bariatric Surgery: www.asmbs.org  · Weight-control Information Network (WIN): win.niddk.nih.gov  This information  is not intended to replace advice given to you by your health care provider. Make sure you discuss any questions you have with your health care provider.  Document Released: 12/18/2006 Document Revised: 05/25/2017 Document Reviewed: 06/18/2014  Elsevier Interactive Patient Education © 2017 [employer]

## 2019-05-02 ENCOUNTER — TELEPHONE (OUTPATIENT)
Dept: FAMILY MEDICINE | Facility: CLINIC | Age: 54
End: 2019-05-02

## 2019-05-02 ENCOUNTER — HOSPITAL ENCOUNTER (OUTPATIENT)
Dept: RADIOLOGY | Age: 54
Discharge: HOME | End: 2019-05-02
Attending: FAMILY MEDICINE
Payer: MEDICARE

## 2019-05-02 DIAGNOSIS — Z12.39 BREAST CANCER SCREENING: ICD-10-CM

## 2019-05-02 PROCEDURE — 77063 BREAST TOMOSYNTHESIS BI: CPT

## 2019-05-02 NOTE — TELEPHONE ENCOUNTER
Can you find out from patient when and where she had her last mammogram? There was an area of concern they found in the left breast and they need her to obtain those images so they can compare if it is new or old

## 2019-05-03 NOTE — TELEPHONE ENCOUNTER
Spoke to pt, she is going to attempt to find out where her last one was and get the images.  She believes it may be 2014 when she had her breast reduction.  She stated she will call a couple different places

## 2019-05-06 ENCOUNTER — TELEPHONE (OUTPATIENT)
Dept: FAMILY MEDICINE | Facility: CLINIC | Age: 54
End: 2019-05-06

## 2019-05-06 NOTE — TELEPHONE ENCOUNTER
Pt states someone called her this morning regarding her mammogram results but she wasn't sure who.  I checked with Priscila and she did not speak to her.  I asked if it was regarding the info documented on the 5/3 telephone call.  She said she reached out to Dr. Hugo Singleton who did her breast reduction surgery in 2014 at [facility].  They are having her complete a records request form and patient states she will have the records by Wednesday.

## 2019-05-10 ENCOUNTER — TELEPHONE (OUTPATIENT)
Dept: FAMILY MEDICINE | Facility: CLINIC | Age: 54
End: 2019-05-10

## 2019-05-10 NOTE — TELEPHONE ENCOUNTER
Pt called to let us know she tried again to get imaging results. She said she reached out to [facility] for her records from 2006 when she had her breast reduction.  Pt was not aware if they were sending us info.  Please review notes from 5/2 and 5/6 as well.  Pt ph. [phone] with any quesitons.

## 2019-05-13 NOTE — TELEPHONE ENCOUNTER
Pt l/m @ [phone] leaving a fax number for me to send a release request for her last mammogram.  Fax number [phone].  Pt stated her last mammo was in 2017.    [phone]

## 2019-05-13 NOTE — TELEPHONE ENCOUNTER
I l/m on pt vm advising her she would have to contact them because radiology is requesting the disc of her mammogram not the report.  I did advise pt she would have to make arrangements to pick that up and get it to radiology for comparison.

## 2019-05-15 ENCOUNTER — TELEPHONE (OUTPATIENT)
Dept: FAMILY MEDICINE | Facility: CLINIC | Age: 54
End: 2019-05-15

## 2019-05-15 ENCOUNTER — DOCUMENTATION (OUTPATIENT)
Dept: BARIATRICS | Facility: CLINIC | Age: 54
End: 2019-05-15

## 2019-05-15 RX ORDER — PRAZOSIN HYDROCHLORIDE 2 MG/1
2 CAPSULE ORAL 2 TIMES DAILY
Status: ON HOLD | COMMUNITY
Start: 2019-04-17 | End: 2019-07-02

## 2019-05-15 RX ORDER — QUETIAPINE FUMARATE 50 MG/1
50 TABLET, FILM COATED ORAL 3 TIMES DAILY
Status: ON HOLD | COMMUNITY
End: 2019-07-02

## 2019-05-15 ASSESSMENT — ENCOUNTER SYMPTOMS
RESPIRATORY NEGATIVE: 1
GASTROINTESTINAL NEGATIVE: 1
HEMATOLOGIC/LYMPHATIC NEGATIVE: 1
ARTHRALGIAS: 1
CARDIOVASCULAR NEGATIVE: 1
CONSTITUTIONAL NEGATIVE: 1
PSYCHIATRIC NEGATIVE: 1
NEUROLOGICAL NEGATIVE: 1
ENDOCRINE NEGATIVE: 1

## 2019-05-15 NOTE — PROGRESS NOTES
Patient ID: Kar Nicholas                              : 1965  MRN: [id number]                                          PCP: Prosper Grullon MD  Visit Date: 2019      Subjective   History of Present Illness:    Kar Nicholas is a 54 y.o. female. She has suffered from severe morbid obesity for more than 10 years and has been unable to lose weight despite multiple attempts at weight loss. The patient has steadily gained weight over time up to their current level.      Kar presents to the Bariatric Program for consideration of weight loss surgery.   She is interested in a Lap vertical sleeve gastrectomy procedure. We have recommended Lap vertical sleeve gastrectomy and Lap Sydnie-en-Y gastric bypass because of BMI and GERD.  She is accompanied by her  for mental health.  She suffers from depression and anxiety. who has provided history as well.    I have asked Kar to obtain old medical records from their PCP, weight loss centers and other physicians in order to review and help document the extreme and severe nature of their weight health problems. I have personally ordered tests and studies to stratify Kar's overall clinical risk including the SF-12, Phillips Sleepiness scale, STOP BANG test, Quality of Life index.  Their Phillips scale is Total score: 12 and STOP-Bang Total Score: 6.  Pertinent PMH includes the following :gallbladder disease (patient reported) cholelithiasis GERD.  She takes reflux PPI daily for 2 years.  She gets symptoms if she does not take the medication.  I have obtained a detailed diet history, exercise history and other important and crucial lifestyle information from Prosper Grullon MD.  I have personally reviewed the records she brought regarding their weight history and medical problems from other caregivers.  I have reviewed the SF-12.    Kar initial point of contact was PCP/ Care provider.  Currently they are not employed.  She is disabled due to  mental health.  In the past she had been a counselor.    She had a cholecystectomy in 2018.  She has a CPAP for EZEQUIEL but has not been using it for the last 3 months.  She needs to replace the filter and hose.  She did not know where to go to get replacements and could not afford it.  Her PCP is Dr. Grullon with [facility] in [place].  She last saw him 3 months.  She had been in a mental health facility for 15 months in 3 different locations.  She was suicidal in 2016-18.  Now she lives with her daughter and grandchildren.  She has been free of alcohol for 2 years.  She knows she needs to stop smoking.  She has a  who is present with her.  Her  did not know she was not using her CPAP.  She has depression, anxiety, PTSD and schizoaffective disorder.  She got her CPAP through [facility] in 2014.  AHI 22.  She had a right knee replacement.      Past Medical History: She  has a past medical history of Alcoholism (CMS/[facility]) ([facility]); Anxiety; Asthma; Bipolar affect, depressed (CMS/[facility]) ([facility]); Body mass index (BMI) 45.0-49.9, adult (CMS/[facility]); Cocaine addiction (CMS/[facility]) ([facility]); Delayed emergence from general anesthesia; Depression; GERD (gastroesophageal reflux disease); History of snoring; Hypertension; Joint pain; Lipid disorder; Migraines; Morbid obesity with BMI of 45.0-49.9, adult (CMS/[facility]) ([facility]); Osteoarthritis; Post-menopause; Pre-diabetes; Schizophrenia (CMS/[facility]) ([facility]); Sciatica; Sleep apnea; and Urinary incontinence. She also has no past medical history of Hard to intubate; Malignant hyperthermia; Motion sickness; or PONV (postoperative nausea and vomiting)..  Past Surgical History: She  has a past surgical history that includes Knee surgery (Right); Reduction mammaplasty (Bilateral); [place] tooth extraction; Colonoscopy; Joint replacement; Cholecystectomy (2018); and Hysterectomy (2010)..  Medication: She has a current medication list which includes the following prescription(s): atorvastatin, losartan,  omeprazole, prazosin, prazosin, quetiapine, quetiapine, venlafaxine xr, and mirabegron..  Allergies: She is allergic to shellfish containing products..    Nutrition:  The patient has failed multiple attempts at weight loss through diet and exercise and has been unsuccessful with weight loss on their own.  The largest amount of weight lost was 26  lbs with reduced calorie.  Since that time, the patient has regained their weight. Patient has not attempted to lose weight with diet pills.  Kar's eating habits include:Excessive snacking  Large Portions   Kar's food preferences include snacking, carbohydrates, salty foods, sweets, skipping meals and poor food choices.   See dietician documentation for complete history.    Exercise:  Kar does not have ambulatory physical limitations.  Kar reports they exercise not at all  She is not exercising but planning to start.  Performance status includes ability to walk 4 blocks and climb 3  Flight(s) of stairs.    Social History:   We have advised the patient against using tobacco products.  We have advised the Kar to avoid aspirin, NSAID medications, both prescription and over the counter products, as these medications can cause ulcers and bleeding.  There use should be only under the direction of the her  own medical specialist.  The patient has been counseled about this and will comply.  We have additionally advised the Kar we do not recommend alcohol consumption following bariatric surgery. The calories from alcohol have no benefit in their daily requirements, and sugars and carbohydrates are high. Patients may experience unexpected responses to alcohol after bariatric surgery and should avoid it if possible.  She has been advised we have psychological services available to discuss concerns regarding alcohol intake an other addictive or impulsive behavior both before and after surgery. If you do consume alcohol, you may experience metabolic and digestive  "issues.  History   Smoking Status   • Current Every Day Smoker   • Packs/day: 0.25   • Years: 30.00   Smokeless Tobacco   • Never Used     History   Alcohol Use No     Comment: Recovering Alcoholic since 10/2016     History   Drug Use No     Comment: None since 11/2016       Review of Systems:  Review of Systems   Constitutional: Negative.    Respiratory: Negative.    Cardiovascular: Negative.    Gastrointestinal: Negative.    Endocrine: Negative.    Genitourinary: Negative.    Musculoskeletal: Positive for arthralgias.   Skin: Negative.    Neurological: Negative.    Hematological: Negative.    Psychiatric/Behavioral: Negative.        Vitals:  Her  height is 1.735 m (5' 8.3\") and weight is 138 kg (303 lb 6.4 oz) (abnormal). Her temperature is 36.5 °C (97.7 °F). Her blood pressure is 121/70. Her oxygen saturation is 96%.  body mass index is 45.73 kg/m².  Weight trend:   Wt Readings from Last 5 Encounters:   05/16/19 (!) 138 kg (303 lb 6.4 oz)   04/15/19 (!) 141 kg (311 lb)   01/21/19 (!) 141 kg (310 lb)   12/04/18 136 kg (300 lb)   11/16/18 136 kg (300 lb)       Physical Exam:   Physical Exam   Constitutional: She is oriented to person, place, and time. She appears well-developed.   HENT:   Head: Normocephalic.   Eyes: Conjunctivae are normal.   Neck: Normal range of motion.   Cardiovascular: Normal rate and regular rhythm.    Pulmonary/Chest: Effort normal and breath sounds normal.   Abdominal: Soft. Bowel sounds are normal.   Musculoskeletal: Normal range of motion.   Neurological: She is alert and oriented to person, place, and time.   Skin: Skin is warm and dry.   Psychiatric: She has a normal mood and affect. Her behavior is normal.   Vitals reviewed.      Patient Active Problem List   Diagnosis   • Gastroesophageal reflux disease   • Hypercholesterolemia   • Obesity   • Osteoarthritis   • Essential hypertension   • Environmental allergies   • Depression   • Anxiety   • Anemia   • Bipolar disorder, manic " (CMS/[facility]) ([facility])   • PTSD (post-traumatic stress disorder)   • Schizophrenia (CMS/[facility]) ([facility])   • Nightmares   • Overactive bladder   • Arthritis of knee   • Obstructive sleep apnea   • Acute blood loss anemia   • Borderline type 2 diabetes mellitus       Assessment/Plan   Kar Nicholas is a 54 y.o. female with medically complicated severe obesity, including a body weight of (!) 138 kg (303 lb 6.4 oz), Body mass index is 45.73 kg/m²..  Their past medical problems and weight-related problems include:  has a past medical history of Alcoholism (CMS/[facility]) ([facility]); Anxiety; Asthma; Bipolar affect, depressed (CMS/[facility]) ([facility]); Body mass index (BMI) 45.0-49.9, adult (CMS/H[facility]); Cocaine addiction (CMS/[facility]) ([facility]); Delayed emergence from general anesthesia; Depression; GERD (gastroesophageal reflux disease); History of snoring; Hypertension; Joint pain; Lipid disorder; Migraines; Morbid obesity with BMI of 45.0-49.9, adult (CMS/[facility]) ([facility]); Osteoarthritis; Post-menopause; Pre-diabetes; Schizophrenia (CMS/[facility]) ([facility]); Sciatica; Sleep apnea; and Urinary incontinence. She also has no past medical history of Hard to intubate; Malignant hyperthermia; Motion sickness; or PONV (postoperative nausea and vomiting).  I have has an extensive discussion with Kar about all of the various types of bariatric procedures available to them. We have had an extensive discussion regarding their choice of a procedure Lap vertical sleeve gastrectomy and Lap Sydnie-en-Y gastric bypass and my recommendation of Lap vertical sleeve gastrectomy and Lap Sydnie-en-Y gastric bypass. I think she is a good candidate for this surgery, and she is interested in a Lap vertical sleeve gastrectomy and Lap Sydnie-en-Y gastric bypass.    I have counseled this patient on multidisciplinary weight loss management, proper nutrition, diet, exercise and behavior modification.    I explained in detail the procedures that we are performing.  All of those procedures can be performed  laparoscopically but there is a chance to convert to open if any technical challenges or complications do occur.  Bariatric surgery is not cosmetic surgery but rather a tool to help a patient make a life-long commitment lifestyle changes including diet, exercise, behavior changes, and taking supplemental vitamins and minerals. Problems after surgery may require more operations to correct them.  Patient will need to lose weight prior to surgery.    We have discussed the importance of learning about all aspects of the bariatric process as well as the importance of post-operative supplements and follow up.    The patient understands the surgical procedures and the different surgical options that are available.  She understands the lifestyle changes that would be required after surgery and has agreed to participate in a pre-operative and postoperative weight management program.  She also expressed understanding of possible risks, had several questions answered and desires to proceed.  The patient should continue to attempt to make changes in their lifestyle, eating habits and exercise to lose weight, achieve a healthy lifestyle in preparation for future bariatric surgery.    Patient will have evaluations and follow up with bariatric dieticians and a psychologist and physical therapist before undergoing a multidisciplinary review of her candidacy.  We also discussed the weight loss requirement and rationale, and other program requirements.    We have discussed the very important need of portion control, good food selection, healthy eating habits, and avoidance of all of the following: snacks, high caloric foods, alcohol, soda, high caloric drinks, chips, fast foods, candy and high carbohydrate foods. We have discussed how the bariatric procedure will not establish the discipline or control to make good dietary choices.  We discussed how a dietician will assist them in creating a healthy diet.    We have discussed the  importance of daily exercise in order for the bariatric procedure to be effective.  We discussed how the bariatric procedure will not motivate them to exercise.  We discussed how a physical therapist will help them to create an appropriate exercise regimen.  We have discussed the importance of attendance at our regular educational classes both for gaining valuable knowledge but also how this may be required for insurance approval.  We discussed that we will attempt to help patients get insurance approval for their procedure but ultimately they are responsible for understanding and knowing all of their own personal insurance plan requirements for approval and we cannot be held responsible for this.  We have discussed the importance of selection of the appropriate bariatric procedure for the first operation and that all additional subsequent operative interventions will be subject to a higher level of complications due to adhesions and scar tissue from previous operations.  We have discussed the importance of pre-operative weight loss to assist in laparoscopic access for the procedure and potential insurance requirements to demonstrate compliance.  We have discussed that this is an elective procedure and the timing of surgery will depend on completion of the entire program.    The risks, benefits, alternatives, and potential complications of all of the procedures were explained in detail including, but not limited to death, anesthesia and medication adverse effect/DVT, pulmonary embolism, trocar site/incisional hernia, wound infection, abdominal infection, bleeding, failure to lose weight or gain weight and change in body image, metabolic complications with calcium, thiamine, vitamin B12, folate, iron, and anemia.     Orders Placed This Encounter   Procedures   • FLUOROSCOPY UPPER GI SERIES     Standing Status:   Future     Standing Expiration Date:   5/16/2020   • CBC and differential     Standing Status:   Future      Standing Expiration Date:   5/16/2020   • Comprehensive metabolic panel     Standing Status:   Future     Standing Expiration Date:   5/16/2020   • Ferritin     Standing Status:   Future     Standing Expiration Date:   5/16/2020   • Folate     Standing Status:   Future     Standing Expiration Date:   5/16/2020   • Hemoglobin A1c     Standing Status:   Future     Standing Expiration Date:   5/16/2020   • H. Pylori, Urea Breath Test     Standing Status:   Future     Standing Expiration Date:   5/16/2020   • Iron and TIBC     Standing Status:   Future     Standing Expiration Date:   5/16/2020   • Lipid panel     Standing Status:   Future     Standing Expiration Date:   5/16/2020   • Prealbumin     Standing Status:   Future     Standing Expiration Date:   5/16/2020   • PTH, intact     Standing Status:   Future     Standing Expiration Date:   5/16/2020   • TSH 3rd generation     Standing Status:   Future     Standing Expiration Date:   5/16/2020   • Vitamin A     Standing Status:   Future     Standing Expiration Date:   5/16/2020   • Vitamin B1, whole blood ( thiamine)     Standing Status:   Future     Standing Expiration Date:   5/16/2020   • Vitamin B12     Standing Status:   Future     Standing Expiration Date:   5/16/2020   • Vitamin D, 25- hydroxy     Standing Status:   Future     Standing Expiration Date:   5/16/2020   • Ambulatory referral to Nutrition Services     Standing Status:   Future     Standing Expiration Date:   11/16/2019     Referral Priority:   Routine     Referral Type:   Consultation     Referral Reason:   Specialty Services Required     Requested Specialty:   Nutrition     Number of Visits Requested:   1   • Ambulatory referral to Psychology     Standing Status:   Future     Standing Expiration Date:   11/16/2019     Referral Priority:   Routine     Referral Type:   Psychiatric     Referral Reason:   Specialty Services Required     Requested Specialty:   Psychology     Number of Visits Requested:    1   • Ambulatory referral to Internal Medicine/ Primary Care     Standing Status:   Future     Standing Expiration Date:   11/16/2019     Referral Priority:   Routine     Referral Type:   Consultation     Referral Reason:   Specialty Services Required     Requested Specialty:   Internal Medicine     Number of Visits Requested:   1   • Ambulatory referral to Pulmonology     Standing Status:   Future     Standing Expiration Date:   11/16/2019     Referral Priority:   Routine     Referral Type:   Consultation     Referral Reason:   Specialty Services Required     Requested Specialty:   Pulmonary Disease     Number of Visits Requested:   1   • Ambulatory Referral to Cardiac Rehab (Exercise)     Scheduling Instructions:      **EMERGENCY ORDERS**      -  Use ACLS protocols in case of emergency      -  Activate and follow Code Blue guidelines      -  Nitroglycerin GR 1/150 SL.PRN-chest pain      - Follow approved emergency treatment protocols      - Call Emergency Response Team, or 911 if ambulatory, if patient is unstable      - Implement diabetic protocol for monitoring safe blood sugar levels     Order Specific Question:   Enter patient into the Outpatient Cardiac Rehab Service     Answer:   Yes     Order Specific Question:   Nurse will complete initial interview, medical record review and cardiovascular assessment     Answer:   Yes     Order Specific Question:   Perform 6 minute walk test as part of pre and post cardiac rehab to evaluate functional status     Answer:   Yes       I have spent 80 minutes with this patient and > 50% of the time spent counseling.      Rio Ramirez MD 5/16/2019

## 2019-05-15 NOTE — TELEPHONE ENCOUNTER
She is having the disc from her last mammogram sent to our office.  It should be coming by mail.  States there is an area of concern needed to be seen for comparison.

## 2019-05-16 ENCOUNTER — TELEPHONE (OUTPATIENT)
Dept: FAMILY MEDICINE | Facility: CLINIC | Age: 54
End: 2019-05-16

## 2019-05-16 ENCOUNTER — OFFICE VISIT (OUTPATIENT)
Dept: BARIATRICS | Facility: CLINIC | Age: 54
End: 2019-05-16
Payer: MEDICARE

## 2019-05-16 VITALS
HEIGHT: 68 IN | TEMPERATURE: 97.7 F | SYSTOLIC BLOOD PRESSURE: 121 MMHG | BODY MASS INDEX: 44.41 KG/M2 | OXYGEN SATURATION: 96 % | DIASTOLIC BLOOD PRESSURE: 70 MMHG | WEIGHT: 293 LBS

## 2019-05-16 DIAGNOSIS — Z13.21 ENCOUNTER FOR SCREENING FOR NUTRITIONAL DISORDER: ICD-10-CM

## 2019-05-16 DIAGNOSIS — E55.9 VITAMIN D DEFICIENCY: ICD-10-CM

## 2019-05-16 DIAGNOSIS — E63.9 NUTRITIONAL DEFICIENCY: ICD-10-CM

## 2019-05-16 DIAGNOSIS — E78.00 HYPERCHOLESTEROLEMIA: ICD-10-CM

## 2019-05-16 DIAGNOSIS — Z11.8 ENCOUNTER FOR SCREENING FOR OTHER INFECTIOUS AND PARASITIC DISEASES: ICD-10-CM

## 2019-05-16 DIAGNOSIS — E66.01 MORBID OBESITY (CMS/HCC): ICD-10-CM

## 2019-05-16 DIAGNOSIS — E53.8 B12 DEFICIENCY: ICD-10-CM

## 2019-05-16 DIAGNOSIS — E44.1 PROTEIN-CALORIE MALNUTRITION, MILD (CMS/HCC): ICD-10-CM

## 2019-05-16 DIAGNOSIS — G47.33 OBSTRUCTIVE SLEEP APNEA: ICD-10-CM

## 2019-05-16 DIAGNOSIS — Z13.228 ENCOUNTER FOR SCREENING FOR METABOLIC DISORDER: ICD-10-CM

## 2019-05-16 DIAGNOSIS — Z13.21 ENCOUNTER FOR VITAMIN DEFICIENCY SCREENING: ICD-10-CM

## 2019-05-16 DIAGNOSIS — K21.9 GASTROESOPHAGEAL REFLUX DISEASE, ESOPHAGITIS PRESENCE NOT SPECIFIED: ICD-10-CM

## 2019-05-16 DIAGNOSIS — I10 ESSENTIAL HYPERTENSION: ICD-10-CM

## 2019-05-16 DIAGNOSIS — E66.01 MORBID (SEVERE) OBESITY DUE TO EXCESS CALORIES (CMS/HCC): Primary | ICD-10-CM

## 2019-05-16 DIAGNOSIS — E46 PROTEIN-CALORIE MALNUTRITION, UNSPECIFIED SEVERITY (CMS/HCC): ICD-10-CM

## 2019-05-16 DIAGNOSIS — E56.9 MULTIPLE VITAMIN DEFICIENCY: ICD-10-CM

## 2019-05-16 DIAGNOSIS — R73.03 BORDERLINE TYPE 2 DIABETES MELLITUS: ICD-10-CM

## 2019-05-16 DIAGNOSIS — K21.9 GASTROESOPHAGEAL REFLUX DISEASE WITHOUT ESOPHAGITIS: ICD-10-CM

## 2019-05-16 PROCEDURE — 99205 OFFICE O/P NEW HI 60 MIN: CPT | Performed by: SURGERY

## 2019-05-16 ASSESSMENT — SLEEP AND FATIGUE QUESTIONNAIRES
HOW LIKELY ARE YOU TO NOD OFF OR FALL ASLEEP WHILE SITTING INACTIVE IN A PUBLIC PLACE: WOULD NEVER DOZE
HOW LIKELY ARE YOU TO NOD OFF OR FALL ASLEEP WHILE SITTING AND TALKING TO SOMEONE: SLIGHT CHANCE OF DOZING
HOW LIKELY ARE YOU TO NOD OFF OR FALL ASLEEP IN A CAR, WHILE STOPPED FOR A FEW MINUTES IN TRAFFIC: WOULD NEVER DOZE
HOW LIKELY ARE YOU TO NOD OFF OR FALL ASLEEP WHILE WATCHING TV: HIGH CHANCE OF DOZING
HOW LIKELY ARE YOU TO NOD OFF OR FALL ASLEEP WHILE SITTING QUIETLY AFTER LUNCH WITHOUT ALCOHOL: SLIGHT CHANCE OF DOZING
HOW LIKELY ARE YOU TO NOD OFF OR FALL ASLEEP WHILE SITTING AND READING: HIGH CHANCE OF DOZING
HOW LIKELY ARE YOU TO NOD OFF OR FALL ASLEEP WHILE LYING DOWN TO REST IN THE AFTERNOON WHEN CIRCUMSTANCES PERMIT: MODERATE CHANCE OF DOZING
HOW LIKELY ARE YOU TO NOD OFF OR FALL ASLEEP WHEN YOU ARE A PASSENGER IN A CAR FOR AN HOUR WITHOUT A BREAK: MODERATE CHANCE OF DOZING

## 2019-05-16 NOTE — LETTER
May 16, 2019     Prosper Grullon MD  [address]  [address]  [facility] [address]    Patient: Kar Nicholas   YOB: 1965   Date of Visit: 2019       Dear Dr. Grullon:    Thank you for referring Kar Nicholas to me for evaluation. Below are my notes for this consultation.    If you have questions, please do not hesitate to call me. I look forward to following your patient along with you.         Sincerely,        Rio Ramirez MD        CC: No Recipients  Rio Ramirez MD  2019  4:25 PM  Sign at close encounter  Patient ID: Kar Nicholas                              : 1965  MRN: [id number]                                          PCP: Prosper Grullon MD  Visit Date: 2019      Subjective   History of Present Illness:    Kar Nicholas is a 54 y.o. female. She has suffered from severe morbid obesity for more than 10 years and has been unable to lose weight despite multiple attempts at weight loss. The patient has steadily gained weight over time up to their current level.      Kar presents to the Bariatric Program for consideration of weight loss surgery.   She is interested in a Lap vertical sleeve gastrectomy procedure. We have recommended Lap vertical sleeve gastrectomy and Lap Sydnie-en-Y gastric bypass because of BMI and GERD.  She is accompanied by her  for mental health.  She suffers from depression and anxiety. who has provided history as well.    I have asked Kar to obtain old medical records from their PCP, weight loss centers and other physicians in order to review and help document the extreme and severe nature of their weight health problems. I have personally ordered tests and studies to stratify Kar's overall clinical risk including the SF-12, [place] Sleepiness scale, STOP BANG test, Quality of Life index.  Their [place] scale is Total score: 12 and STOP-Bang Total Score: 6.  Pertinent PMH includes the following :gallbladder  disease (patient reported) cholelithiasis GERD.  She takes reflux PPI daily for 2 years.  She gets symptoms if she does not take the medication.  I have obtained a detailed diet history, exercise history and other important and crucial lifestyle information from Prosper Grullon MD.  I have personally reviewed the records she brought regarding their weight history and medical problems from other caregivers.  I have reviewed the SF-12.    Kar initial point of contact was PCP/ Care provider.  Currently they are not employed.  She is disabled due to mental health.  In the past she had been a counselor.    She had a cholecystectomy in 2018.  She has a CPAP for EZEQUIEL but has not been using it for the last 3 months.  She needs to replace the filter and hose.  She did not know where to go to get replacements and could not afford it.  Her PCP is Dr. Grullon with [facility] in [place].  She last saw him 3 months.  She had been in a mental health facility for 15 months in 3 different locations.  She was suicidal in 2016-18.  Now she lives with her daughter and grandchildren.  She has been free of alcohol for 2 years.  She knows she needs to stop smoking.  She has a  who is present with her.  Her  did not know she was not using her CPAP.  She has depression, anxiety, PTSD and schizoaffective disorder.  She got her CPAP through [facility] in 2014.  AHI 22.  She had a right knee replacement.      Past Medical History: She  has a past medical history of Alcoholism (CMS/[facility]) ([facility]); Anxiety; Asthma; Bipolar affect, depressed (CMS/[facility]) ([facility]); Body mass index (BMI) 45.0-49.9, adult (CMS/[facility]); Cocaine addiction (CMS/[facility]) ([facility]); Delayed emergence from general anesthesia; Depression; GERD (gastroesophageal reflux disease); History of snoring; Hypertension; Joint pain; Lipid disorder; Migraines; Morbid obesity with BMI of 45.0-49.9, adult (CMS/[facility]) ([facility]); Osteoarthritis; Post-menopause; Pre-diabetes; Schizophrenia (CMS/[facility])  (HCC); Sciatica; Sleep apnea; and Urinary incontinence. She also has no past medical history of Hard to intubate; Malignant hyperthermia; Motion sickness; or PONV (postoperative nausea and vomiting)..  Past Surgical History: She  has a past surgical history that includes Knee surgery (Right); Reduction mammaplasty (Bilateral); [place] tooth extraction; Colonoscopy; Joint replacement; Cholecystectomy (2018); and Hysterectomy (2010)..  Medication: She has a current medication list which includes the following prescription(s): atorvastatin, losartan, omeprazole, prazosin, prazosin, quetiapine, quetiapine, venlafaxine xr, and mirabegron..  Allergies: She is allergic to shellfish containing products..    Nutrition:  The patient has failed multiple attempts at weight loss through diet and exercise and has been unsuccessful with weight loss on their own.  The largest amount of weight lost was 26  lbs with reduced calorie.  Since that time, the patient has regained their weight. Patient has not attempted to lose weight with diet pills.  Kar's eating habits include:Excessive snacking  Large Portions   Kar's food preferences include snacking, carbohydrates, salty foods, sweets, skipping meals and poor food choices.   See dietician documentation for complete history.    Exercise:  Kar does not have ambulatory physical limitations.  Kar reports they exercise not at all  She is not exercising but planning to start.  Performance status includes ability to walk 4 blocks and climb 3  Flight(s) of stairs.    Social History:   We have advised the patient against using tobacco products.  We have advised the Kar to avoid aspirin, NSAID medications, both prescription and over the counter products, as these medications can cause ulcers and bleeding.  There use should be only under the direction of the her  own medical specialist.  The patient has been counseled about this and will comply.  We have additionally advised the  "Kar we do not recommend alcohol consumption following bariatric surgery. The calories from alcohol have no benefit in their daily requirements, and sugars and carbohydrates are high. Patients may experience unexpected responses to alcohol after bariatric surgery and should avoid it if possible.  She has been advised we have psychological services available to discuss concerns regarding alcohol intake an other addictive or impulsive behavior both before and after surgery. If you do consume alcohol, you may experience metabolic and digestive issues.  History   Smoking Status   • Current Every Day Smoker   • Packs/day: 0.25   • Years: 30.00   Smokeless Tobacco   • Never Used     History   Alcohol Use No     Comment: Recovering Alcoholic since 10/2016     History   Drug Use No     Comment: None since 11/2016       Review of Systems:  Review of Systems   Constitutional: Negative.    Respiratory: Negative.    Cardiovascular: Negative.    Gastrointestinal: Negative.    Endocrine: Negative.    Genitourinary: Negative.    Musculoskeletal: Positive for arthralgias.   Skin: Negative.    Neurological: Negative.    Hematological: Negative.    Psychiatric/Behavioral: Negative.        Vitals:  Her  height is 1.735 m (5' 8.3\") and weight is 138 kg (303 lb 6.4 oz) (abnormal). Her temperature is 36.5 °C (97.7 °F). Her blood pressure is 121/70. Her oxygen saturation is 96%.  body mass index is 45.73 kg/m².  Weight trend:   Wt Readings from Last 5 Encounters:   05/16/19 (!) 138 kg (303 lb 6.4 oz)   04/15/19 (!) 141 kg (311 lb)   01/21/19 (!) 141 kg (310 lb)   12/04/18 136 kg (300 lb)   11/16/18 136 kg (300 lb)       Physical Exam:   Physical Exam   Constitutional: She is oriented to person, place, and time. She appears well-developed.   HENT:   Head: Normocephalic.   Eyes: Conjunctivae are normal.   Neck: Normal range of motion.   Cardiovascular: Normal rate and regular rhythm.    Pulmonary/Chest: Effort normal and breath sounds " normal.   Abdominal: Soft. Bowel sounds are normal.   Musculoskeletal: Normal range of motion.   Neurological: She is alert and oriented to person, place, and time.   Skin: Skin is warm and dry.   Psychiatric: She has a normal mood and affect. Her behavior is normal.   Vitals reviewed.      Patient Active Problem List   Diagnosis   • Gastroesophageal reflux disease   • Hypercholesterolemia   • Obesity   • Osteoarthritis   • Essential hypertension   • Environmental allergies   • Depression   • Anxiety   • Anemia   • Bipolar disorder, manic (CMS/[facility]) ([facility])   • PTSD (post-traumatic stress disorder)   • Schizophrenia (CMS/[facility]) ([facility])   • Nightmares   • Overactive bladder   • Arthritis of knee   • Obstructive sleep apnea   • Acute blood loss anemia   • Borderline type 2 diabetes mellitus       Assessment/Plan   Kar Nicholas is a 54 y.o. female with medically complicated severe obesity, including a body weight of (!) 138 kg (303 lb 6.4 oz), Body mass index is 45.73 kg/m²..  Their past medical problems and weight-related problems include:  has a past medical history of Alcoholism (CMS/[facility]) ([facility]); Anxiety; Asthma; Bipolar affect, depressed (CMS/[facility]) ([facility]); Body mass index (BMI) 45.0-49.9, adult (CMS/[facility]); Cocaine addiction (CMS/[facility]) ([facility]); Delayed emergence from general anesthesia; Depression; GERD (gastroesophageal reflux disease); History of snoring; Hypertension; Joint pain; Lipid disorder; Migraines; Morbid obesity with BMI of 45.0-49.9, adult (CMS/[facility]) ([facility]); Osteoarthritis; Post-menopause; Pre-diabetes; Schizophrenia (CMS/[facility]) ([facility]); Sciatica; Sleep apnea; and Urinary incontinence. She also has no past medical history of Hard to intubate; Malignant hyperthermia; Motion sickness; or PONV (postoperative nausea and vomiting).  I have has an extensive discussion with Kar about all of the various types of bariatric procedures available to them. We have had an extensive discussion regarding their choice of a procedure  Lap vertical sleeve gastrectomy and Lap Sydnie-en-Y gastric bypass and my recommendation of Lap vertical sleeve gastrectomy and Lap Sydnie-en-Y gastric bypass. I think she is a good candidate for this surgery, and she is interested in a Lap vertical sleeve gastrectomy and Lap Sydnie-en-Y gastric bypass.    I have counseled this patient on multidisciplinary weight loss management, proper nutrition, diet, exercise and behavior modification.    I explained in detail the procedures that we are performing.  All of those procedures can be performed laparoscopically but there is a chance to convert to open if any technical challenges or complications do occur.  Bariatric surgery is not cosmetic surgery but rather a tool to help a patient make a life-long commitment lifestyle changes including diet, exercise, behavior changes, and taking supplemental vitamins and minerals. Problems after surgery may require more operations to correct them.  Patient will need to lose weight prior to surgery.    We have discussed the importance of learning about all aspects of the bariatric process as well as the importance of post-operative supplements and follow up.    The patient understands the surgical procedures and the different surgical options that are available.  She understands the lifestyle changes that would be required after surgery and has agreed to participate in a pre-operative and postoperative weight management program.  She also expressed understanding of possible risks, had several questions answered and desires to proceed.  The patient should continue to attempt to make changes in their lifestyle, eating habits and exercise to lose weight, achieve a healthy lifestyle in preparation for future bariatric surgery.    Patient will have evaluations and follow up with bariatric dieticians and a psychologist and physical therapist before undergoing a multidisciplinary review of her candidacy.  We also discussed the weight loss  requirement and rationale, and other program requirements.    We have discussed the very important need of portion control, good food selection, healthy eating habits, and avoidance of all of the following: snacks, high caloric foods, alcohol, soda, high caloric drinks, chips, fast foods, candy and high carbohydrate foods. We have discussed how the bariatric procedure will not establish the discipline or control to make good dietary choices.  We discussed how a dietician will assist them in creating a healthy diet.    We have discussed the importance of daily exercise in order for the bariatric procedure to be effective.  We discussed how the bariatric procedure will not motivate them to exercise.  We discussed how a physical therapist will help them to create an appropriate exercise regimen.  We have discussed the importance of attendance at our regular educational classes both for gaining valuable knowledge but also how this may be required for insurance approval.  We discussed that we will attempt to help patients get insurance approval for their procedure but ultimately they are responsible for understanding and knowing all of their own personal insurance plan requirements for approval and we cannot be held responsible for this.  We have discussed the importance of selection of the appropriate bariatric procedure for the first operation and that all additional subsequent operative interventions will be subject to a higher level of complications due to adhesions and scar tissue from previous operations.  We have discussed the importance of pre-operative weight loss to assist in laparoscopic access for the procedure and potential insurance requirements to demonstrate compliance.  We have discussed that this is an elective procedure and the timing of surgery will depend on completion of the entire program.    The risks, benefits, alternatives, and potential complications of all of the procedures were explained in  detail including, but not limited to death, anesthesia and medication adverse effect/DVT, pulmonary embolism, trocar site/incisional hernia, wound infection, abdominal infection, bleeding, failure to lose weight or gain weight and change in body image, metabolic complications with calcium, thiamine, vitamin B12, folate, iron, and anemia.     Orders Placed This Encounter   Procedures   • FLUOROSCOPY UPPER GI SERIES     Standing Status:   Future     Standing Expiration Date:   5/16/2020   • CBC and differential     Standing Status:   Future     Standing Expiration Date:   5/16/2020   • Comprehensive metabolic panel     Standing Status:   Future     Standing Expiration Date:   5/16/2020   • Ferritin     Standing Status:   Future     Standing Expiration Date:   5/16/2020   • Folate     Standing Status:   Future     Standing Expiration Date:   5/16/2020   • Hemoglobin A1c     Standing Status:   Future     Standing Expiration Date:   5/16/2020   • H. Pylori, Urea Breath Test     Standing Status:   Future     Standing Expiration Date:   5/16/2020   • Iron and TIBC     Standing Status:   Future     Standing Expiration Date:   5/16/2020   • Lipid panel     Standing Status:   Future     Standing Expiration Date:   5/16/2020   • Prealbumin     Standing Status:   Future     Standing Expiration Date:   5/16/2020   • PTH, intact     Standing Status:   Future     Standing Expiration Date:   5/16/2020   • TSH 3rd generation     Standing Status:   Future     Standing Expiration Date:   5/16/2020   • Vitamin A     Standing Status:   Future     Standing Expiration Date:   5/16/2020   • Vitamin B1, whole blood ( thiamine)     Standing Status:   Future     Standing Expiration Date:   5/16/2020   • Vitamin B12     Standing Status:   Future     Standing Expiration Date:   5/16/2020   • Vitamin D, 25- hydroxy     Standing Status:   Future     Standing Expiration Date:   5/16/2020   • Ambulatory referral to Nutrition Services     Standing  Status:   Future     Standing Expiration Date:   11/16/2019     Referral Priority:   Routine     Referral Type:   Consultation     Referral Reason:   Specialty Services Required     Requested Specialty:   Nutrition     Number of Visits Requested:   1   • Ambulatory referral to Psychology     Standing Status:   Future     Standing Expiration Date:   11/16/2019     Referral Priority:   Routine     Referral Type:   Psychiatric     Referral Reason:   Specialty Services Required     Requested Specialty:   Psychology     Number of Visits Requested:   1   • Ambulatory referral to Internal Medicine/ Primary Care     Standing Status:   Future     Standing Expiration Date:   11/16/2019     Referral Priority:   Routine     Referral Type:   Consultation     Referral Reason:   Specialty Services Required     Requested Specialty:   Internal Medicine     Number of Visits Requested:   1   • Ambulatory referral to Pulmonology     Standing Status:   Future     Standing Expiration Date:   11/16/2019     Referral Priority:   Routine     Referral Type:   Consultation     Referral Reason:   Specialty Services Required     Requested Specialty:   Pulmonary Disease     Number of Visits Requested:   1   • Ambulatory Referral to Cardiac Rehab (Exercise)     Scheduling Instructions:      **EMERGENCY ORDERS**      -  Use ACLS protocols in case of emergency      -  Activate and follow Code Blue guidelines      -  Nitroglycerin GR 1/150 SL.PRN-chest pain      - Follow approved emergency treatment protocols      - Call Emergency Response Team, or 911 if ambulatory, if patient is unstable      - Implement diabetic protocol for monitoring safe blood sugar levels     Order Specific Question:   Enter patient into the Outpatient Cardiac Rehab Service     Answer:   Yes     Order Specific Question:   Nurse will complete initial interview, medical record review and cardiovascular assessment     Answer:   Yes     Order Specific Question:   Perform 6 minute  walk test as part of pre and post cardiac rehab to evaluate functional status     Answer:   Yes       I have spent 80 minutes with this patient and > 50% of the time spent counseling.      Rio Ramirez MD 5/16/2019

## 2019-05-17 RX ORDER — ATORVASTATIN CALCIUM 10 MG/1
TABLET, FILM COATED ORAL
Qty: 30 TABLET | Refills: 1 | Status: SHIPPED | OUTPATIENT
Start: 2019-05-17 | End: 2019-07-22 | Stop reason: SDUPTHER

## 2019-05-17 RX ORDER — OMEPRAZOLE 20 MG/1
CAPSULE, DELAYED RELEASE ORAL
Qty: 30 CAPSULE | Refills: 1 | Status: SHIPPED | OUTPATIENT
Start: 2019-05-17 | End: 2019-07-22 | Stop reason: SDUPTHER

## 2019-05-20 NOTE — TELEPHONE ENCOUNTER
Please advise patient that the radiologist was able to compare to old images. Everything looks good. We will repeat mammogram in 1 year

## 2019-06-14 ENCOUNTER — TELEPHONE (OUTPATIENT)
Dept: BARIATRICS | Facility: HOSPITAL | Age: 54
End: 2019-06-14

## 2019-06-14 NOTE — TELEPHONE ENCOUNTER
Patient interested in starting in program.  Discussed with patient psychological barriers for surgery.  Patient will schedule with Zeus Barahona at [facility] and have him discuss with Petr Chapa plan for patient to move forward in program.  [facility] Wellness program information also given to patient.  Patient will weigh options and call us back if she wants to move forward with surgery process.

## 2019-06-17 ENCOUNTER — TELEPHONE (OUTPATIENT)
Dept: BARIATRICS | Facility: HOSPITAL | Age: 54
End: 2019-06-17

## 2019-07-02 ENCOUNTER — ANESTHESIA EVENT (OUTPATIENT)
Dept: ENDOSCOPY | Facility: HOSPITAL | Age: 54
End: 2019-07-02
Payer: MEDICARE

## 2019-07-02 ENCOUNTER — ANESTHESIA (OUTPATIENT)
Dept: ENDOSCOPY | Facility: HOSPITAL | Age: 54
End: 2019-07-02
Payer: MEDICARE

## 2019-07-02 ENCOUNTER — HOSPITAL ENCOUNTER (OUTPATIENT)
Facility: HOSPITAL | Age: 54
Discharge: HOME | End: 2019-07-02
Attending: INTERNAL MEDICINE | Admitting: INTERNAL MEDICINE
Payer: MEDICARE

## 2019-07-02 VITALS
WEIGHT: 293 LBS | HEART RATE: 62 BPM | RESPIRATION RATE: 20 BRPM | TEMPERATURE: 97.4 F | BODY MASS INDEX: 44.41 KG/M2 | SYSTOLIC BLOOD PRESSURE: 167 MMHG | DIASTOLIC BLOOD PRESSURE: 100 MMHG | HEIGHT: 68 IN | OXYGEN SATURATION: 84 %

## 2019-07-02 DIAGNOSIS — Z80.0 FAMILY HISTORY OF COLON CANCER: ICD-10-CM

## 2019-07-02 DIAGNOSIS — R12 HEARTBURN: ICD-10-CM

## 2019-07-02 DIAGNOSIS — Z12.11 COLON CANCER SCREENING: ICD-10-CM

## 2019-07-02 PROCEDURE — 75000060 HC EGD BIOPSY: Performed by: INTERNAL MEDICINE

## 2019-07-02 PROCEDURE — 0DB38ZX EXCISION OF LOWER ESOPHAGUS, VIA NATURAL OR ARTIFICIAL OPENING ENDOSCOPIC, DIAGNOSTIC: ICD-10-PCS | Performed by: INTERNAL MEDICINE

## 2019-07-02 PROCEDURE — 25800000 HC PHARMACY IV SOLUTIONS: Performed by: INTERNAL MEDICINE

## 2019-07-02 PROCEDURE — 25000000 HC PHARMACY GENERAL: Performed by: ANESTHESIOLOGY

## 2019-07-02 PROCEDURE — 37000001 HC ANESTHESIA GENERAL: Performed by: INTERNAL MEDICINE

## 2019-07-02 PROCEDURE — 0DBN8ZZ EXCISION OF SIGMOID COLON, VIA NATURAL OR ARTIFICIAL OPENING ENDOSCOPIC: ICD-10-PCS | Performed by: INTERNAL MEDICINE

## 2019-07-02 PROCEDURE — 88305 TISSUE EXAM BY PATHOLOGIST: CPT | Performed by: INTERNAL MEDICINE

## 2019-07-02 PROCEDURE — 0DB18ZX EXCISION OF UPPER ESOPHAGUS, VIA NATURAL OR ARTIFICIAL OPENING ENDOSCOPIC, DIAGNOSTIC: ICD-10-PCS | Performed by: INTERNAL MEDICINE

## 2019-07-02 PROCEDURE — 75000020 HC COLONSCOPY SNARE: Performed by: INTERNAL MEDICINE

## 2019-07-02 PROCEDURE — 63600000 HC DRUGS/DETAIL CODE: Performed by: ANESTHESIOLOGY

## 2019-07-02 RX ORDER — FENTANYL CITRATE 50 UG/ML
INJECTION, SOLUTION INTRAMUSCULAR; INTRAVENOUS AS NEEDED
Status: DISCONTINUED | OUTPATIENT
Start: 2019-07-02 | End: 2019-07-02 | Stop reason: SURG

## 2019-07-02 RX ORDER — ROCURONIUM BROMIDE 10 MG/ML
INJECTION, SOLUTION INTRAVENOUS AS NEEDED
Status: DISCONTINUED | OUTPATIENT
Start: 2019-07-02 | End: 2019-07-02 | Stop reason: SURG

## 2019-07-02 RX ORDER — PROPOFOL 10 MG/ML
INJECTION, EMULSION INTRAVENOUS AS NEEDED
Status: DISCONTINUED | OUTPATIENT
Start: 2019-07-02 | End: 2019-07-02 | Stop reason: SURG

## 2019-07-02 RX ORDER — NEOSTIGMINE METHYLSULFATE 1 MG/ML
INJECTION INTRAVENOUS AS NEEDED
Status: DISCONTINUED | OUTPATIENT
Start: 2019-07-02 | End: 2019-07-02 | Stop reason: SURG

## 2019-07-02 RX ORDER — SODIUM CHLORIDE 9 MG/ML
INJECTION, SOLUTION INTRAVENOUS CONTINUOUS
Status: DISCONTINUED | OUTPATIENT
Start: 2019-07-02 | End: 2019-07-02 | Stop reason: HOSPADM

## 2019-07-02 RX ORDER — GLYCOPYRROLATE 0.6MG/3ML
SYRINGE (ML) INTRAVENOUS AS NEEDED
Status: DISCONTINUED | OUTPATIENT
Start: 2019-07-02 | End: 2019-07-02 | Stop reason: SURG

## 2019-07-02 RX ADMIN — Medication 100 MG: at 10:20

## 2019-07-02 RX ADMIN — FENTANYL CITRATE 100 MCG: 50 INJECTION INTRAMUSCULAR; INTRAVENOUS at 10:20

## 2019-07-02 RX ADMIN — GLYCOPYRROLATE 0.2 MG: 0.2 INJECTION INTRAMUSCULAR; INTRAVENOUS at 11:00

## 2019-07-02 RX ADMIN — NEOSTIGMINE METHYLSULFATE 3 MG: 1 INJECTION INTRAVENOUS at 11:00

## 2019-07-02 RX ADMIN — SODIUM CHLORIDE: 9 INJECTION, SOLUTION INTRAVENOUS at 09:41

## 2019-07-02 RX ADMIN — ROCURONIUM BROMIDE 30 MG: 10 INJECTION INTRAVENOUS at 10:20

## 2019-07-02 RX ADMIN — PROPOFOL 280 MG: 10 INJECTION, EMULSION INTRAVENOUS at 10:20

## 2019-07-02 ASSESSMENT — PAIN SCALES - GENERAL: PAIN_LEVEL: 0

## 2019-07-02 ASSESSMENT — ENCOUNTER SYMPTOMS: HEADACHES: 1

## 2019-07-02 NOTE — ANESTHESIA PROCEDURE NOTES
Airway  Urgency: elective    Start Time: 7/2/2019 10:23 AM    General Information and Staff    Patient location during procedure: OR    Indications and Patient Condition  Indications for airway management: anesthesia and airway protection  Sedation level: general  Patient position: sniffing and ramp  Mask difficulty assessment: 0 - not attempted    Final Airway Details  Final airway type: endotracheal airway      Successful airway: ETT  Cuffed: yes   Successful intubation technique: video laryngoscopy  Facilitating devices/methods: intubating stylet  ETT size: 7.0 mm  Placement verified by: chest auscultation   Measured from: lips  ETT to lips (cm): 22  Number of attempts at approach: 1  Number of other approaches attempted: 0  Atraumatic airway insertion

## 2019-07-02 NOTE — OP NOTE
_______________________________________________________________________________  Patient Name: Kar Dick        Procedure Date: 7/2/2019 10:06 AM  MRN: [id number]                     Account Number: [id number]  YOB: 1965              Age: 54  Gender: Female                        Note Status: Finalized  Attending MD: Milly Noguera DO  _______________________________________________________________________________  Procedure:            Colonoscopy  Indications:          Screening in patient at increased risk: Family history  of 1st-degree relative with colorectal cancer  Providers:            Milly Noguera DO (Doctor)  Referring MD:         ALFREDO LORENZO MD  Requesting Provider:  Medicines:            Monitored Anesthesia Care  Complications:        No immediate complications.  _______________________________________________________________________________  Procedure:            After I obtained informed consent, the scope was passed  under direct vision. Throughout the procedure, the  patient's blood pressure, pulse, and oxygen saturations  were monitored continuously. The Colonoscope was  introduced through the anus and advanced to the cecum,  identified by appendiceal orifice and ileocecal valve.  The colonoscopy was performed without difficulty. The  patient tolerated the procedure well. The quality of  the bowel preparation was inadequate. The ileocecal  valve, appendiceal orifice, and rectum were  photographed.  Estimated Blood Loss: Estimated blood loss was minimal.  Findings:  The perianal and digital rectal examinations were normal.  Multiple small and large-mouthed diverticula were found in the entire  colon.  A 3 mm polyp was found in the recto-sigmoid colon. The polyp was  sessile. The polyp was removed with a cold snare. Resection and  retrieval were complete. Estimated blood loss was minimal.  Copious quantities of semi-liquid semi-solid stool was found in the  cecum,  precluding visualization. Lavage of the area was performed,  resulting in incomplete clearance with continued poor visualization.  No additional abnormalities were found on retroflexion.  Impression:           - Preparation of the colon was inadequate.  - Diverticulosis in the entire examined colon.  - One 3 mm polyp at the recto-sigmoid colon, removed  with a cold snare. Resected and retrieved.  - Stool in the cecum.  Recommendation:       - Patient has a contact number available for  emergencies. The signs and symptoms of potential  delayed complications were discussed with the patient.  Return to normal activities tomorrow. Written discharge  instructions were provided to the patient.  - Resume previous diet.  - Continue present medications.  - Await pathology results.  - Repeat colonoscopy in 6 months because the bowel  preparation was poor.  Procedure Code(s):    --- Professional ---  09205, Colonoscopy, flexible; with removal of tumor(s),  polyp(s), or other lesion(s) by snare technique  Diagnosis Code(s):    --- Professional ---  Z80.0, Family history of malignant neoplasm of  digestive organs  D12.7, Benign neoplasm of rectosigmoid junction  K57.30, Diverticulosis of large intestine without  perforation or abscess without bleeding  CPT copyright 2015 [employer] Medical [employer]. All rights reserved.  The codes documented in this report are preliminary and upon  review may  be revised to meet current compliance requirements.  _________________  Milly Noguera DO  7/2/2019 11:07:41 AM  This report has been signed electronically.  Number of Addenda: 0  Note Initiated On: 7/2/2019 10:06 AM

## 2019-07-02 NOTE — H&P
"   GI Consult Note          Subjective   Kar Nicholas is a 54 y.o. female who was admitted for Colon cancer screening [Z12.11]  Family history of colon cancer [Z80.0]  Heartburn [R12].         Past Medical History:   Diagnosis Date   • Alcoholism (CMS/HCC) ([facility])     In Recovery  Since October 2016    • Anxiety    • Asthma     Remote   • Bipolar affect, depressed (CMS/HCC) ([facility])    • Body mass index (BMI) 45.0-49.9, adult (CMS/[facility])    • Cocaine addiction (CMS/HCC) ([facility])      None since November 2016   • Delayed emergence from general anesthesia    • Depression    • GERD (gastroesophageal reflux disease)    • History of snoring    • Hypertension    • Joint pain    • Lipid disorder    • Migraines    • Morbid obesity with BMI of 45.0-49.9, adult (CMS/[facility]) ([facility])    • Osteoarthritis     Knees   • Post-menopause    • Pre-diabetes    • Schizophrenia (CMS/HCC) ([facility])    • Sciatica    • Sleep apnea     Uses CPAP occas- To bring CPAP Day of Sx   • Urinary incontinence      Past Surgical History:   Procedure Laterality Date   • CHOLECYSTECTOMY  2018   • COLONOSCOPY     • HYSTERECTOMY  2010    PARTIAL   • JOINT REPLACEMENT      right knee   • KNEE SURGERY Right    • REDUCTION MAMMAPLASTY Bilateral    • WISDOM TOOTH EXTRACTION       Allergies   Allergen Reactions   • Shellfish Containing Products Angioedema     Other reaction(s): shellfish       Home Medications:  •  atorvastatin, TAKE 1 TABLET BY MOUTH DAILY  •  losartan, Take 1 tablet (50 mg total) by mouth daily.  •  omeprazole, TAKE 1 CAPSULE BY MOUTH DAILY BEFORE BREAKFAST  •  prazosin, Take 1 mg by mouth 2 (two) times a day.    •  QUEtiapine, 150 mg 2 (two) times a day.    •  venlafaxine XR, Take 1 capsule (150 mg total) by mouth daily. (Patient taking differently: Take 150 mg by mouth 2 (two) times a day.  )        Physical Exam    Physical Exam  /77   Pulse 76   Temp 36.8 °C (98.3 °F) (Temporal)   Resp 18   Ht 1.727 m (5' 8\")   Wt 133 kg (293 lb)   SpO2 94% "   BMI 44.55 kg/m²     General appearance: no distress  Head: normocephalic  Lungs: clear to auscultation anteriorly   Heart: regular rate   Abdomen: soft, non-tender; bowel sounds normal; no masses, no organomegaly  Neurologic: awake and alert and oriented        Assessment     1/ heartburnn - EGD for EoE  2. Screening - edison Noguera DO  7/2/2019

## 2019-07-02 NOTE — ANESTHESIA POSTPROCEDURE EVALUATION
Patient: Kar Nicholas    Procedure Summary     Date:  07/02/19 Room / Location:   GI 1 /  GI    Anesthesia Start:  1017 Anesthesia Stop:  1120    Procedures:       Colonoscopy (N/A Anus)      UPPER GASTROINTESTINAL ENDOSCOPY WITH BIOPSY (N/A Esophagus) Diagnosis:       Colon cancer screening      Family history of colon cancer      Heartburn      (Screening CRC - Family History CRC - Heart Burn)    Provider:  Milly Noguera DO Responsible Provider:  Brayden Lucas MD    Anesthesia Type:  general ASA Status:  3          Anesthesia Type: general  PACU Vitals     No data found.            Anesthesia Post Evaluation    Pain score: 0  Pain management: satisfactory to patient  Mode of pain management: IV medication  Patient location during evaluation: PACU  Patient participation: complete - patient participated  Level of consciousness: awake and alert  Cardiovascular status: acceptable  Airway Patency: adequate  Respiratory status: acceptable  Hydration status: stable  Anesthetic complications: no

## 2019-07-02 NOTE — OP NOTE
_______________________________________________________________________________  Patient Name: Kar Dick        Procedure Date: 7/2/2019 10:10 AM  MRN: [id number]                     Account Number: [id number]  YOB: 1965              Age: 54  Gender: Female                        Note Status: Finalized  Attending MD: Milly Noguera DO  _______________________________________________________________________________  Procedure:            Upper GI endoscopy  Indications:          Heartburn  Providers:            Milly Noguera DO (Doctor)  Referring MD:         ALFREDO LORENZO MD  Requesting Provider:  Medicines:            General Anesthesia  Complications:        No immediate complications.  _______________________________________________________________________________  Procedure:            After obtaining informed consent, the endoscope was  passed under direct vision. Throughout the procedure,  the patient's blood pressure, pulse, and oxygen  saturations were monitored continuously. The was  introduced through the mouth, and advanced to the third  part of duodenum. The upper GI endoscopy was  accomplished without difficulty. The patient tolerated  the procedure well.  Estimated Blood Loss: Estimated blood loss was minimal.  Findings:  The examined esophagus was normal. Biopsies were obtained from the  proximal and distal esophagus with cold forceps for histology of  suspected eosinophilic esophagitis. Estimated blood loss was minimal.  The Z-line was regular and was found 40 cm from the incisors.  A small hiatus hernia was present.  The exam of the stomach was otherwise normal.  The examined duodenum was normal.  The cardia and gastric fundus were normal on retroflexion.  Impression:           - Normal esophagus. Biopsied.  - Z-line regular, 40 cm from the incisors.  - Small hiatus hernia.  - Normal examined duodenum.  Recommendation:       - Patient has a contact number available  for  emergencies. The signs and symptoms of potential  delayed complications were discussed with the patient.  Return to normal activities tomorrow. Written discharge  instructions were provided to the patient.  - Resume previous diet.  - Continue present medications.  - Await pathology results.  Procedure Code(s):    --- Professional ---  73577, Esophagogastroduodenoscopy, flexible, transoral;  with biopsy, single or multiple  Diagnosis Code(s):    --- Professional ---  K44.9, Diaphragmatic hernia without obstruction or  gangrene  R12, Heartburn  CPT copyright 2015 [employer]. All rights reserved.  The codes documented in this report are preliminary and upon  review may  be revised to meet current compliance requirements.  _________________  Milly Noguera DO  7/2/2019 10:42:35 AM  This report has been signed electronically.  Number of Addenda: 0  Note Initiated On: 7/2/2019 10:10 AM

## 2019-07-02 NOTE — ANESTHESIA PREPROCEDURE EVALUATION
Anesthesia ROS/MED HX    Anesthesia History    History of anesthetic complications  - PONV  Pulmonary    asthma   Sleep apnea  Neuro/Psych    Headaches   Psychiatric history  Cardiovascular   hypertension  GI/Hepatic   GERD  Endo/Other  Body Habitus: Morbidly Obese      Past Surgical History:   Procedure Laterality Date   • CHOLECYSTECTOMY  2018   • COLONOSCOPY     • HYSTERECTOMY  2010    PARTIAL   • JOINT REPLACEMENT      right knee   • KNEE SURGERY Right    • REDUCTION MAMMAPLASTY Bilateral    • WISDOM TOOTH EXTRACTION       Past Medical History:   Diagnosis Date   • Alcoholism (CMS/[facility]) ([facility])     In Recovery  Since October 2016    • Anxiety    • Asthma     Remote   • Bipolar affect, depressed (CMS/[facility]) ([facility])    • Body mass index (BMI) 45.0-49.9, adult (CMS/[facility])    • Cocaine addiction (CMS/[facility]) ([facility])      None since November 2016   • Delayed emergence from general anesthesia    • Depression    • GERD (gastroesophageal reflux disease)    • History of snoring    • Hypertension    • Joint pain    • Lipid disorder    • Migraines    • Morbid obesity with BMI of 45.0-49.9, adult (CMS/[facility]) ([facility])    • Osteoarthritis     Knees   • Post-menopause    • Pre-diabetes    • Schizophrenia (CMS/[facility]) ([facility])    • Sciatica    • Sleep apnea     Uses CPAP occas- To bring CPAP Day of Sx   • Urinary incontinence        Physical Exam    Airway   Mallampati: II   TM distance: >3 FB   Neck ROM: full  Cardiovascular    Rhythm: regular   Rate: normal  Pulmonary    Decreased breath sounds        Anesthesia Plan    Plan: general    Technique: general endotracheal     Airway: direct visual laryngoscopy, oral intubation and video laryngoscope   ASA 3  Anesthetic plan and risks discussed with: patient  Induction:    intravenous   Postop Plan:   Patient Disposition: inpatient floor planned admission   Pain Management: IV analgesics

## 2019-08-19 ENCOUNTER — OFFICE VISIT (OUTPATIENT)
Dept: FAMILY MEDICINE | Facility: CLINIC | Age: 54
End: 2019-08-19
Payer: MEDICARE

## 2019-08-19 VITALS
BODY MASS INDEX: 44.41 KG/M2 | DIASTOLIC BLOOD PRESSURE: 70 MMHG | TEMPERATURE: 98.6 F | HEART RATE: 87 BPM | RESPIRATION RATE: 16 BRPM | SYSTOLIC BLOOD PRESSURE: 118 MMHG | OXYGEN SATURATION: 98 % | HEIGHT: 68 IN | WEIGHT: 293 LBS

## 2019-08-19 DIAGNOSIS — E55.9 VITAMIN D DEFICIENCY: ICD-10-CM

## 2019-08-19 DIAGNOSIS — F20.9 SCHIZOPHRENIA, UNSPECIFIED TYPE: ICD-10-CM

## 2019-08-19 DIAGNOSIS — F41.9 ANXIETY: ICD-10-CM

## 2019-08-19 DIAGNOSIS — F43.10 PTSD (POST-TRAUMATIC STRESS DISORDER): ICD-10-CM

## 2019-08-19 DIAGNOSIS — R73.03 BORDERLINE TYPE 2 DIABETES MELLITUS: Primary | ICD-10-CM

## 2019-08-19 DIAGNOSIS — I10 ESSENTIAL HYPERTENSION: ICD-10-CM

## 2019-08-19 DIAGNOSIS — F33.9 EPISODE OF RECURRENT MAJOR DEPRESSIVE DISORDER, UNSPECIFIED DEPRESSION EPISODE SEVERITY (CMS/HCC): ICD-10-CM

## 2019-08-19 DIAGNOSIS — K21.9 GASTROESOPHAGEAL REFLUX DISEASE WITHOUT ESOPHAGITIS: ICD-10-CM

## 2019-08-19 DIAGNOSIS — F51.5 NIGHTMARES: ICD-10-CM

## 2019-08-19 DIAGNOSIS — F31.10: ICD-10-CM

## 2019-08-19 DIAGNOSIS — E78.00 HYPERCHOLESTEROLEMIA: ICD-10-CM

## 2019-08-19 PROCEDURE — 99214 OFFICE O/P EST MOD 30 MIN: CPT | Performed by: FAMILY MEDICINE

## 2019-08-19 RX ORDER — ATORVASTATIN CALCIUM 10 MG/1
10 TABLET, FILM COATED ORAL NIGHTLY
Qty: 30 TABLET | Refills: 0 | Status: SHIPPED | OUTPATIENT
Start: 2019-08-19 | End: 2019-09-27 | Stop reason: SDUPTHER

## 2019-08-19 RX ORDER — DULOXETIN HYDROCHLORIDE 30 MG/1
CAPSULE, DELAYED RELEASE ORAL
COMMUNITY
Start: 2019-08-06 | End: 2019-08-19

## 2019-08-19 RX ORDER — QUETIAPINE FUMARATE 300 MG/1
150 TABLET, FILM COATED ORAL 2 TIMES DAILY
COMMUNITY
Start: 2019-08-19 | End: 2019-10-21

## 2019-08-19 RX ORDER — DULOXETIN HYDROCHLORIDE 30 MG/1
CAPSULE, DELAYED RELEASE ORAL
COMMUNITY
Start: 2019-08-19 | End: 2019-10-21

## 2019-08-19 RX ORDER — PRAZOSIN HYDROCHLORIDE 1 MG/1
1 CAPSULE ORAL 2 TIMES DAILY
COMMUNITY
Start: 2019-08-19 | End: 2020-08-07

## 2019-08-19 RX ORDER — VENLAFAXINE HYDROCHLORIDE 150 MG/1
150 CAPSULE, EXTENDED RELEASE ORAL 2 TIMES DAILY
COMMUNITY
Start: 2019-08-19 | End: 2019-10-21

## 2019-08-19 RX ORDER — LOSARTAN POTASSIUM 50 MG/1
50 TABLET ORAL DAILY
Qty: 30 TABLET | Refills: 0 | Status: SHIPPED | OUTPATIENT
Start: 2019-08-19 | End: 2019-09-27 | Stop reason: SDUPTHER

## 2019-08-19 ASSESSMENT — ENCOUNTER SYMPTOMS
FATIGUE: 0
ABDOMINAL PAIN: 0
DIFFICULTY URINATING: 0
ABDOMINAL DISTENTION: 0
PALPITATIONS: 0
DIARRHEA: 0
NAUSEA: 0
WHEEZING: 0
FEVER: 0
VOMITING: 0
HEADACHES: 0
TROUBLE SWALLOWING: 0
SHORTNESS OF BREATH: 0
DIZZINESS: 0
BLOOD IN STOOL: 0
LIGHT-HEADEDNESS: 0
WEAKNESS: 0
COUGH: 0
UNEXPECTED WEIGHT CHANGE: 0
CONSTIPATION: 0
ADENOPATHY: 0

## 2019-08-19 NOTE — PROGRESS NOTES
Daily Progress Note       Patient ID: Kar Nicholas is a 54 y.o. female.    HPI    53 year old presents for follow up visit.      Due to severe morbid obesity and inability to lose weight with multiple attempt - she was considering weight loss surgery. She was referred to Dr Rio Ramirez in [place]. Followed up on 5/16/2019. There was interest in lap vertical sleeve gastrectomy and lap Sydnie-en-Y gastric bypass. Further work up and evaluation was ordered.     EGD done on 7/2/2019 - Normal.   Colonoscopy done on 7/2/2019 - one 3 mm polyp - recommended 6 month follow up 2/2 poor prep     She has decided she does not want to proceed. Currently has been working on her diet - lost 16 pounds since last visit on 4/15/2019       Hx of Schizophrenia, Bipolar - Manic, PTSD and Anxiety - has been following up with Psychiatry - part of [facility] [employer] - RATNA Fowler. Also doing group therapy Currently on seroquel and currently being weaned off Effexor XR and switched to Cymbalta. Saw psychiatry last week.  States doing well on current therapy. Follows up with therapist twice weekly - group/individual sessions.      Hypertension - currently on losartan 50 mg daily - tolerating with no complaints. Does not check BP at home      Hypercholesterolemia - currently on atorvastatin 10 mg qhs - tolerating with no complaints.      GERD - currently on omeprazole 20mg daily - tolerating with no complaints. Recently had been off the medication because she had no refills and states the reflux symptoms got significantly worse. States doing much better since being back on the medication      Migraines - reports well controlled at this time - usually will take advil with onset and take imitrex if significant. Reports last migraine was about 1-2 months ago. Had previously been 2-3x/week. Usually located frontal or occipital areas - throbbing sensation. + Light and sound sensitivity and Nausea. Relieved by laying in dark quiet room. +  Aura     Nightmares - currently on prazosin with improvement in nightmares.      Hx of alcohol and drug abuse. States has been alcohol free since October 31, 2016 and drug free since November 11, 2016. Was on cocaine     The following have been reviewed and updated as appropriate in this visit:  Allergies  Meds  Problems       Review of Systems   Constitutional: Negative for fatigue, fever and unexpected weight change.   HENT: Negative for trouble swallowing.    Eyes: Negative for visual disturbance.   Respiratory: Negative for cough, shortness of breath and wheezing.    Cardiovascular: Negative for chest pain, palpitations and leg swelling.   Gastrointestinal: Negative for abdominal distention, abdominal pain, blood in stool, constipation, diarrhea, nausea and vomiting.   Endocrine: Negative for cold intolerance and heat intolerance.   Genitourinary: Negative for difficulty urinating.   Skin: Negative for rash.   Neurological: Negative for dizziness, syncope, weakness, light-headedness and headaches.   Hematological: Negative for adenopathy.             Current Outpatient Prescriptions   Medication Sig Dispense Refill   • atorvastatin (LIPITOR) 10 mg tablet Take 1 tablet (10 mg total) by mouth nightly. 30 tablet 0   • DULoxetine (CYMBALTA) 30 mg capsule      • losartan (COZAAR) 50 mg tablet Take 1 tablet (50 mg total) by mouth daily. 30 tablet 0   • prazosin (MINIPRESS) 1 mg capsule Take 1 capsule (1 mg total) by mouth 2 (two) times a day.     • QUEtiapine (SEROquel) 300 mg tablet 0.5 tablets (150 mg total) 2 (two) times a day.     • venlafaxine XR (EFFEXOR-XR) 150 mg 24 hr capsule Take 1 capsule (150 mg total) by mouth 2 (two) times a day.     • ranitidine (ZANTAC) 150 mg tablet Take 1 tablet (150 mg total) by mouth 2 (two) times a day. 60 tablet 3     No current facility-administered medications for this visit.      Past Medical History:   Diagnosis Date   • Alcoholism (CMS/HCC) (HCC)     In Recovery  Since  October 2016    • Anxiety    • Asthma     Remote   • Bipolar affect, depressed (CMS/[facility]) ([facility])    • Body mass index (BMI) 45.0-49.9, adult (CMS/[facility])    • Cocaine addiction (CMS/HCC) ([facility])      None since November 2016   • Delayed emergence from general anesthesia    • Depression    • GERD (gastroesophageal reflux disease)    • History of snoring    • Hypertension    • Joint pain    • Lipid disorder    • Migraines    • Morbid obesity with BMI of 45.0-49.9, adult (CMS/[facility]) ([facility])    • Osteoarthritis     Knees   • Post-menopause    • Pre-diabetes    • Schizophrenia (CMS/[facility]) ([facility])    • Sciatica    • Sleep apnea     Uses CPAP occas- To bring CPAP Day of Sx   • Urinary incontinence      Family History   Problem Relation Age of Onset   • Colon cancer Mother    • Lung cancer Father    • Heart failure Brother    • Heart block Daughter    • Breast cancer Other    • Breast cancer Cousin      Past Surgical History:   Procedure Laterality Date   • CHOLECYSTECTOMY  2018   • COLONOSCOPY     • HYSTERECTOMY  2010    PARTIAL   • JOINT REPLACEMENT      right knee   • KNEE SURGERY Right    • REDUCTION MAMMAPLASTY Bilateral    • WISDOM TOOTH EXTRACTION       Social History     Social History   • Marital status: Single     Spouse name: N/A   • Number of children: N/A   • Years of education: N/A     Occupational History   • Not on file.     Social History Main Topics   • Smoking status: Current Every Day Smoker     Packs/day: 0.25     Years: 30.00   • Smokeless tobacco: Never Used   • Alcohol use No      Comment: Recovering Alcoholic since 10/2016   • Drug use: No      Comment: None since 11/2016   • Sexual activity: Defer     Other Topics Concern   • Not on file     Social History Narrative   • No narrative on file     Allergies   Allergen Reactions   • Shellfish Containing Products Angioedema     Other reaction(s): shellfish       Objective   No new labs.    Vitals:    08/19/19 1454   BP: 118/70   BP Location: Right upper arm   Patient  "Position: Sitting   Pulse: 87   Resp: 16   Temp: 37 °C (98.6 °F)   TempSrc: Oral   SpO2: 98%   Weight: 134 kg (295 lb 12.8 oz)   Height: 1.727 m (5' 8\")         Physical Exam   Constitutional: She is oriented to person, place, and time. She appears well-developed and well-nourished.   HENT:   Head: Normocephalic and atraumatic.   Eyes: Pupils are equal, round, and reactive to light. Conjunctivae and EOM are normal.   Neck: Normal range of motion. Neck supple.   Cardiovascular: Normal rate, regular rhythm and normal heart sounds.    Pulmonary/Chest: Effort normal and breath sounds normal. No respiratory distress.   Abdominal: Soft. Bowel sounds are normal. She exhibits no distension. There is no tenderness.   Musculoskeletal: Normal range of motion. She exhibits no edema.   Neurological: She is alert and oriented to person, place, and time.   Skin: Skin is warm and dry.   Nursing note and vitals reviewed.      Assessment/Plan   Problem List Items Addressed This Visit     Gastroesophageal reflux disease    Current Assessment & Plan     Reflux precautions given. EGD showed small hiatal hernia otherwise normal - will attempt to switch PPI to H2 blocker.          Relevant Medications    ranitidine (ZANTAC) 150 mg tablet    Hypercholesterolemia    Current Assessment & Plan     Counseled patient about therapeutic lifestyle changes          Relevant Medications    atorvastatin (LIPITOR) 10 mg tablet    Other Relevant Orders    Comprehensive metabolic panel    Lipid panel    Essential hypertension    Current Assessment & Plan     Well controlled. Counseled patient about DASH diet, moderate exercise and weight loss          Relevant Medications    losartan (COZAAR) 50 mg tablet    prazosin (MINIPRESS) 1 mg capsule    Other Relevant Orders    CBC and Differential    Comprehensive metabolic panel    Microalbumin/Creatinine Ur Random    TSH w reflex FT4    Depression    Current Assessment & Plan     Continue follow up with " Psychiatry. Currently being weaned off Effexor XR and switched to Cymbalta.         Relevant Medications    venlafaxine XR (EFFEXOR-XR) 150 mg 24 hr capsule    QUEtiapine (SEROquel) 300 mg tablet    DULoxetine (CYMBALTA) 30 mg capsule    Anxiety    Current Assessment & Plan     Continue follow up with Psychiatry. Currently being weaned off Effexor XR and switched to Cymbalta.         Relevant Medications    venlafaxine XR (EFFEXOR-XR) 150 mg 24 hr capsule    QUEtiapine (SEROquel) 300 mg tablet    DULoxetine (CYMBALTA) 30 mg capsule    Bipolar disorder, manic (CMS/HCC) (HCC)    Current Assessment & Plan     Continue follow up with Psychiatry. Currently being weaned off Effexor XR and switched to Cymbalta.         Relevant Medications    venlafaxine XR (EFFEXOR-XR) 150 mg 24 hr capsule    QUEtiapine (SEROquel) 300 mg tablet    DULoxetine (CYMBALTA) 30 mg capsule    PTSD (post-traumatic stress disorder)    Current Assessment & Plan     Continue follow up with Psychiatry. Currently being weaned off Effexor XR and switched to Cymbalta.         Relevant Medications    venlafaxine XR (EFFEXOR-XR) 150 mg 24 hr capsule    QUEtiapine (SEROquel) 300 mg tablet    DULoxetine (CYMBALTA) 30 mg capsule    Schizophrenia (CMS/HCC) (HCC)    Current Assessment & Plan     Continue follow up with Psychiatry. Currently being weaned off Effexor XR and switched to Cymbalta.         Relevant Medications    venlafaxine XR (EFFEXOR-XR) 150 mg 24 hr capsule    QUEtiapine (SEROquel) 300 mg tablet    DULoxetine (CYMBALTA) 30 mg capsule    Nightmares    Current Assessment & Plan     Cont follow up with Psychiatry          Relevant Medications    venlafaxine XR (EFFEXOR-XR) 150 mg 24 hr capsule    QUEtiapine (SEROquel) 300 mg tablet    prazosin (MINIPRESS) 1 mg capsule    DULoxetine (CYMBALTA) 30 mg capsule    Borderline type 2 diabetes mellitus - Primary    Current Assessment & Plan     Counseled patient about the importance of low carb diet,  moderate exercise and weight loss. Decrease intake of bread, rice, pasta, starches, juice, soda and sweets.         Relevant Orders    Comprehensive metabolic panel    Hemoglobin A1c    Microalbumin/Creatinine Ur Random    Vitamin D deficiency    Relevant Orders    Vitamin D 25 hydroxy          Prosper Grullon MD  8/19/2019

## 2019-08-19 NOTE — ASSESSMENT & PLAN NOTE
Counseled patient about the importance of low carb diet, moderate exercise and weight loss. Decrease intake of bread, rice, pasta, starches, juice, soda and sweets.

## 2019-08-19 NOTE — ASSESSMENT & PLAN NOTE
Continue follow up with Psychiatry. Currently being weaned off Effexor XR and switched to Cymbalta.

## 2019-08-19 NOTE — PATIENT INSTRUCTIONS
"  Patient Education   Smoking Cessation, Tips for Success  If you are ready to quit smoking, congratulations! You have chosen to help yourself be healthier. Cigarettes bring nicotine, tar, carbon monoxide, and other irritants into your body. Your lungs, heart, and blood vessels will be able to work better without these poisons. There are many different ways to quit smoking. Nicotine gum, nicotine patches, a nicotine inhaler, or nicotine nasal spray can help with physical craving. Hypnosis, support groups, and medicines help break the habit of smoking.  WHAT THINGS CAN I DO TO MAKE QUITTING EASIER?   Here are some tips to help you quit for good:  · Pick a date when you will quit smoking completely. Tell all of your friends and family about your plan to quit on that date.  · Do not try to slowly cut down on the number of cigarettes you are smoking. Pick a quit date and quit smoking completely starting on that day.  · Throw away all cigarettes.    · Clean and remove all ashtrays from your home, work, and car.  · On a card, write down your reasons for quitting. Carry the card with you and read it when you get the urge to smoke.  · Cleanse your body of nicotine. Drink enough water and fluids to keep your urine clear or pale yellow. Do this after quitting to flush the nicotine from your body.  · Learn to predict your moods. Do not let a bad situation be your excuse to have a cigarette. Some situations in your life might tempt you into wanting a cigarette.  · Never have \"just one\" cigarette. It leads to wanting another and another. Remind yourself of your decision to quit.  · Change habits associated with smoking. If you smoked while driving or when feeling stressed, try other activities to replace smoking. Stand up when drinking your coffee. Brush your teeth after eating. Sit in a different chair when you read the paper. Avoid alcohol while trying to quit, and try to drink fewer caffeinated beverages. Alcohol and caffeine " "may urge you to smoke.  · Avoid foods and drinks that can trigger a desire to smoke, such as sugary or spicy foods and alcohol.  · Ask people who smoke not to smoke around you.  · Have something planned to do right after eating or having a cup of coffee. For example, plan to take a walk or exercise.  · Try a relaxation exercise to calm you down and decrease your stress. Remember, you may be tense and nervous for the first 2 weeks after you quit, but this will pass.  · Find new activities to keep your hands busy. Play with a pen, coin, or rubber band. Doodle or draw things on paper.  · Brush your teeth right after eating. This will help cut down on the craving for the taste of tobacco after meals. You can also try mouthwash.    · Use oral substitutes in place of cigarettes. Try using lemon drops, carrots, cinnamon sticks, or chewing gum. Keep them handy so they are available when you have the urge to smoke.  · When you have the urge to smoke, try deep breathing.  · Designate your home as a nonsmoking area.  · If you are a heavy smoker, ask your health care provider about a prescription for nicotine chewing gum. It can ease your withdrawal from nicotine.  · Reward yourself. Set aside the cigarette money you save and buy yourself something nice.  · Look for support from others. Join a support group or smoking cessation program. Ask someone at home or at work to help you with your plan to quit smoking.  · Always ask yourself, \"Do I need this cigarette or is this just a reflex?\" Tell yourself, \"Today, I choose not to smoke,\" or \"I do not want to smoke.\" You are reminding yourself of your decision to quit.  · Do not replace cigarette smoking with electronic cigarettes (commonly called e-cigarettes). The safety of e-cigarettes is unknown, and some may contain harmful chemicals.  · If you relapse, do not give up! Plan ahead and think about what you will do the next time you get the urge to smoke.  HOW WILL I FEEL WHEN I " QUIT SMOKING?  You may have symptoms of withdrawal because your body is used to nicotine (the addictive substance in cigarettes). You may crave cigarettes, be irritable, feel very hungry, cough often, get headaches, or have difficulty concentrating. The withdrawal symptoms are only temporary. They are strongest when you first quit but will go away within 10-14 days. When withdrawal symptoms occur, stay in control. Think about your reasons for quitting. Remind yourself that these are signs that your body is healing and getting used to being without cigarettes. Remember that withdrawal symptoms are easier to treat than the major diseases that smoking can cause.   Even after the withdrawal is over, expect periodic urges to smoke. However, these cravings are generally short lived and will go away whether you smoke or not. Do not smoke!  WHAT RESOURCES ARE AVAILABLE TO HELP ME QUIT SMOKING?  Your health care provider can direct you to community resources or hospitals for support, which may include:  · Group support.  · Education.  · Hypnosis.  · Therapy.     This information is not intended to replace advice given to you by your health care provider. Make sure you discuss any questions you have with your health care provider.     Document Released: 09/15/2005 Document Revised: 01/08/2016 Document Reviewed: 06/05/2014  [employer] Interactive Patient Education ©2016 [employer]

## 2019-10-04 ENCOUNTER — APPOINTMENT (OUTPATIENT)
Dept: LAB | Age: 54
End: 2019-10-04
Attending: FAMILY MEDICINE
Payer: COMMERCIAL

## 2019-10-04 DIAGNOSIS — Z13.228 ENCOUNTER FOR SCREENING FOR METABOLIC DISORDER: ICD-10-CM

## 2019-10-04 DIAGNOSIS — Z13.21 ENCOUNTER FOR VITAMIN DEFICIENCY SCREENING: ICD-10-CM

## 2019-10-04 DIAGNOSIS — K21.9 GASTROESOPHAGEAL REFLUX DISEASE WITHOUT ESOPHAGITIS: ICD-10-CM

## 2019-10-04 DIAGNOSIS — E46 PROTEIN-CALORIE MALNUTRITION, UNSPECIFIED SEVERITY (CMS/HCC): ICD-10-CM

## 2019-10-04 DIAGNOSIS — Z13.21 ENCOUNTER FOR SCREENING FOR NUTRITIONAL DISORDER: ICD-10-CM

## 2019-10-04 DIAGNOSIS — I10 ESSENTIAL HYPERTENSION: ICD-10-CM

## 2019-10-04 DIAGNOSIS — E44.1 PROTEIN-CALORIE MALNUTRITION, MILD (CMS/HCC): ICD-10-CM

## 2019-10-04 DIAGNOSIS — E55.9 VITAMIN D DEFICIENCY: ICD-10-CM

## 2019-10-04 DIAGNOSIS — E63.9 NUTRITIONAL DEFICIENCY: ICD-10-CM

## 2019-10-04 DIAGNOSIS — Z11.8 ENCOUNTER FOR SCREENING FOR OTHER INFECTIOUS AND PARASITIC DISEASES: ICD-10-CM

## 2019-10-04 DIAGNOSIS — E56.9 MULTIPLE VITAMIN DEFICIENCY: ICD-10-CM

## 2019-10-04 DIAGNOSIS — E66.01 MORBID (SEVERE) OBESITY DUE TO EXCESS CALORIES (CMS/HCC): ICD-10-CM

## 2019-10-04 DIAGNOSIS — E66.01 MORBID OBESITY (CMS/HCC): ICD-10-CM

## 2019-10-04 DIAGNOSIS — K21.9 GASTROESOPHAGEAL REFLUX DISEASE, ESOPHAGITIS PRESENCE NOT SPECIFIED: ICD-10-CM

## 2019-10-04 DIAGNOSIS — G47.33 OBSTRUCTIVE SLEEP APNEA: ICD-10-CM

## 2019-10-04 DIAGNOSIS — E53.8 B12 DEFICIENCY: ICD-10-CM

## 2019-10-04 DIAGNOSIS — R73.03 BORDERLINE TYPE 2 DIABETES MELLITUS: ICD-10-CM

## 2019-10-04 DIAGNOSIS — E78.00 HYPERCHOLESTEROLEMIA: ICD-10-CM

## 2019-10-04 LAB
25(OH)D3 SERPL-MCNC: 16 NG/ML (ref 30–100)
ALBUMIN SERPL-MCNC: 3.7 G/DL (ref 3.4–5)
ALBUMIN/CREAT UR: 1.9 UG/MG
ALP SERPL-CCNC: 79 IU/L (ref 35–126)
ALT SERPL-CCNC: 12 IU/L (ref 11–54)
ANION GAP SERPL CALC-SCNC: 10 MEQ/L (ref 3–15)
AST SERPL-CCNC: 12 IU/L (ref 15–41)
BASOPHILS # BLD: 0.04 K/UL (ref 0.01–0.1)
BASOPHILS NFR BLD: 0.9 %
BILIRUB SERPL-MCNC: 0.7 MG/DL (ref 0.3–1.2)
BUN SERPL-MCNC: 6 MG/DL (ref 8–20)
CALCIUM SERPL-MCNC: 9.6 MG/DL (ref 8.9–10.3)
CALCIUM SERPL-MCNC: 9.6 MG/DL (ref 8.9–10.3)
CHLORIDE SERPL-SCNC: 106 MEQ/L (ref 98–109)
CHOLEST SERPL-MCNC: 173 MG/DL
CO2 SERPL-SCNC: 27 MEQ/L (ref 22–32)
CREAT SERPL-MCNC: 0.9 MG/DL
CREAT UR-MCNC: 130.7 MG/DL
DIFFERENTIAL METHOD BLD: NORMAL
EOSINOPHIL # BLD: 0.07 K/UL (ref 0.04–0.36)
EOSINOPHIL NFR BLD: 1.5 %
ERYTHROCYTE [DISTWIDTH] IN BLOOD BY AUTOMATED COUNT: 13.3 % (ref 11.7–14.4)
EST. AVERAGE GLUCOSE BLD GHB EST-MCNC: 128 MG/DL
FERRITIN SERPL-MCNC: 52 NG/ML (ref 11–250)
FOLATE SERPL-MCNC: 18.6 NG/ML
GFR SERPL CREATININE-BSD FRML MDRD: >60 ML/MIN/1.73M*2
GLUCOSE SERPL-MCNC: 91 MG/DL (ref 70–99)
HBA1C MFR BLD HPLC: 6.1 %
HCT VFR BLDCO AUTO: 40 %
HDLC SERPL-MCNC: 52 MG/DL
HDLC SERPL: 3.3 {RATIO}
HGB BLD-MCNC: 12.5 G/DL
IMM GRANULOCYTES # BLD AUTO: 0.02 K/UL (ref 0–0.08)
IMM GRANULOCYTES NFR BLD AUTO: 0.4 %
IRON SATN MFR SERPL: 25 % (ref 15–45)
IRON SERPL-MCNC: 81 UG/DL (ref 35–150)
LDLC SERPL CALC-MCNC: 105 MG/DL
LYMPHOCYTES # BLD: 1.35 K/UL (ref 1.2–3.5)
LYMPHOCYTES NFR BLD: 29.7 %
MCH RBC QN AUTO: 28 PG (ref 28–33.2)
MCHC RBC AUTO-ENTMCNC: 31.3 G/DL (ref 32.2–35.5)
MCV RBC AUTO: 89.5 FL (ref 83–98)
MICROALBUMIN UR-MCNC: 2.5 MG/L
MONOCYTES # BLD: 0.33 K/UL (ref 0.28–0.8)
MONOCYTES NFR BLD: 7.3 %
NEUTROPHILS # BLD: 2.73 K/UL (ref 1.7–7)
NEUTS SEG NFR BLD: 60.2 %
NONHDLC SERPL-MCNC: 121 MG/DL
NRBC BLD-RTO: 0 %
PDW BLD AUTO: 12.1 FL (ref 9.4–12.3)
PLATELET # BLD AUTO: 245 K/UL
POTASSIUM SERPL-SCNC: 4.3 MEQ/L (ref 3.6–5.1)
PREALB SERPL-MCNC: 21.9 MG/DL (ref 18–38)
PROT SERPL-MCNC: 6.5 G/DL (ref 6–8.2)
PTH-INTACT SERPL-MCNC: 80.5 PG/ML (ref 12–88)
RBC # BLD AUTO: 4.47 M/UL (ref 3.93–5.22)
SODIUM SERPL-SCNC: 143 MEQ/L (ref 136–144)
TIBC SERPL-MCNC: 318 UG/DL (ref 270–460)
TRIGL SERPL-MCNC: 80 MG/DL (ref 30–149)
TSH SERPL DL<=0.05 MIU/L-ACNC: 1.37 MIU/L (ref 0.34–5.6)
TSH SERPL DL<=0.05 MIU/L-ACNC: 1.37 MIU/L (ref 0.34–5.6)
UIBC SERPL-MCNC: 237 UG/DL (ref 180–360)
VIT B12 SERPL-MCNC: 398 PG/ML (ref 180–914)
WBC # BLD AUTO: 4.54 K/UL

## 2019-10-04 PROCEDURE — 83036 HEMOGLOBIN GLYCOSYLATED A1C: CPT

## 2019-10-04 PROCEDURE — 83970 ASSAY OF PARATHORMONE: CPT

## 2019-10-04 PROCEDURE — 82607 VITAMIN B-12: CPT

## 2019-10-04 PROCEDURE — 36415 COLL VENOUS BLD VENIPUNCTURE: CPT

## 2019-10-04 PROCEDURE — 82306 VITAMIN D 25 HYDROXY: CPT

## 2019-10-04 PROCEDURE — 80061 LIPID PANEL: CPT

## 2019-10-04 PROCEDURE — 83540 ASSAY OF IRON: CPT

## 2019-10-04 PROCEDURE — 82728 ASSAY OF FERRITIN: CPT

## 2019-10-04 PROCEDURE — 85025 COMPLETE CBC W/AUTO DIFF WBC: CPT

## 2019-10-04 PROCEDURE — 84443 ASSAY THYROID STIM HORMONE: CPT

## 2019-10-04 PROCEDURE — 82043 UR ALBUMIN QUANTITATIVE: CPT

## 2019-10-04 PROCEDURE — 82040 ASSAY OF SERUM ALBUMIN: CPT

## 2019-10-04 PROCEDURE — 84134 ASSAY OF PREALBUMIN: CPT

## 2019-10-04 PROCEDURE — 82746 ASSAY OF FOLIC ACID SERUM: CPT

## 2019-10-08 ENCOUNTER — TELEPHONE (OUTPATIENT)
Dept: FAMILY MEDICINE | Facility: CLINIC | Age: 54
End: 2019-10-08

## 2019-10-08 NOTE — TELEPHONE ENCOUNTER
Dr. Grullon- thoughts on this? Patient would like to restart omeprazole 20mg for GERD and would like to d/c ranitidine. She has a f/u OV 10-21-19.

## 2019-10-08 NOTE — TELEPHONE ENCOUNTER
"The patient called in with complaints of the medication not working for her acid reflux. States she was put on Zantac and would like to be put back on her previous medication.     Patient states she is gagging a lot during the night that its keeping her up and shes throwing up bile. Her words were \"I feel like sometimes im dying when I cough and throw up like I do\".    I told her its best to be seen if this is something that's continuously happening and she said she doesn't want to come in and will wait until her next appointment which is on  10/21. She just wants to refill to be sent for tomorrow for when she gets her other medications at the pharmacy.   "

## 2019-10-09 RX ORDER — OMEPRAZOLE 20 MG/1
20 CAPSULE, DELAYED RELEASE ORAL
Qty: 30 CAPSULE | Refills: 0 | Status: SHIPPED | OUTPATIENT
Start: 2019-10-09 | End: 2019-10-21

## 2019-10-09 NOTE — TELEPHONE ENCOUNTER
TT Carina and let her know that script was sent to pharm and that this is only a temporary medication and that a long term plan will be made at her upcoming OV.

## 2019-10-09 NOTE — TELEPHONE ENCOUNTER
We can temporarily restart her on omeprazole 20 mg daily. I'll discuss other options during her follow up visit with me

## 2019-10-21 ENCOUNTER — OFFICE VISIT (OUTPATIENT)
Dept: FAMILY MEDICINE | Facility: CLINIC | Age: 54
End: 2019-10-21
Payer: COMMERCIAL

## 2019-10-21 VITALS
DIASTOLIC BLOOD PRESSURE: 80 MMHG | RESPIRATION RATE: 18 BRPM | BODY MASS INDEX: 44.41 KG/M2 | SYSTOLIC BLOOD PRESSURE: 128 MMHG | HEART RATE: 84 BPM | HEIGHT: 68 IN | TEMPERATURE: 98.2 F | WEIGHT: 293 LBS | OXYGEN SATURATION: 98 %

## 2019-10-21 DIAGNOSIS — F20.9 SCHIZOPHRENIA, UNSPECIFIED TYPE: ICD-10-CM

## 2019-10-21 DIAGNOSIS — E78.00 HYPERCHOLESTEROLEMIA: ICD-10-CM

## 2019-10-21 DIAGNOSIS — F41.9 ANXIETY: ICD-10-CM

## 2019-10-21 DIAGNOSIS — F33.9 EPISODE OF RECURRENT MAJOR DEPRESSIVE DISORDER, UNSPECIFIED DEPRESSION EPISODE SEVERITY (CMS/HCC): ICD-10-CM

## 2019-10-21 DIAGNOSIS — R73.03 BORDERLINE TYPE 2 DIABETES MELLITUS: ICD-10-CM

## 2019-10-21 DIAGNOSIS — F31.10 BIPOLAR AFFECTIVE DISORDER, CURRENT EPISODE MANIC, CURRENT EPISODE SEVERITY UNSPECIFIED (CMS/HCC): ICD-10-CM

## 2019-10-21 DIAGNOSIS — K21.9 GASTROESOPHAGEAL REFLUX DISEASE WITHOUT ESOPHAGITIS: Primary | ICD-10-CM

## 2019-10-21 DIAGNOSIS — F43.10 PTSD (POST-TRAUMATIC STRESS DISORDER): ICD-10-CM

## 2019-10-21 DIAGNOSIS — E55.9 VITAMIN D DEFICIENCY: ICD-10-CM

## 2019-10-21 DIAGNOSIS — I10 ESSENTIAL HYPERTENSION: ICD-10-CM

## 2019-10-21 PROCEDURE — 99214 OFFICE O/P EST MOD 30 MIN: CPT | Performed by: FAMILY MEDICINE

## 2019-10-21 RX ORDER — DULOXETIN HYDROCHLORIDE 30 MG/1
30 CAPSULE, DELAYED RELEASE ORAL DAILY
COMMUNITY
Start: 2019-10-21 | End: 2019-10-21

## 2019-10-21 RX ORDER — ATORVASTATIN CALCIUM 10 MG/1
10 TABLET, FILM COATED ORAL NIGHTLY
Qty: 90 TABLET | Refills: 1 | Status: SHIPPED | OUTPATIENT
Start: 2019-10-21 | End: 2019-10-21 | Stop reason: SDUPTHER

## 2019-10-21 RX ORDER — QUETIAPINE FUMARATE 300 MG/1
150 TABLET, FILM COATED ORAL 2 TIMES DAILY
Qty: 60 TABLET | Refills: 0 | COMMUNITY
Start: 2019-10-21 | End: 2020-08-07

## 2019-10-21 RX ORDER — LOSARTAN POTASSIUM 50 MG/1
50 TABLET ORAL EVERY MORNING
Qty: 90 TABLET | Refills: 1 | Status: SHIPPED | OUTPATIENT
Start: 2019-10-21 | End: 2020-06-05

## 2019-10-21 RX ORDER — ATORVASTATIN CALCIUM 10 MG/1
10 TABLET, FILM COATED ORAL NIGHTLY
Qty: 90 TABLET | Refills: 1 | Status: SHIPPED | OUTPATIENT
Start: 2019-10-21 | End: 2020-06-05

## 2019-10-21 RX ORDER — FAMOTIDINE 40 MG/1
40 TABLET, FILM COATED ORAL NIGHTLY PRN
Qty: 30 TABLET | Refills: 3 | Status: SHIPPED | OUTPATIENT
Start: 2019-10-21 | End: 2019-10-29 | Stop reason: ALTCHOICE

## 2019-10-21 RX ORDER — DULOXETIN HYDROCHLORIDE 60 MG/1
60 CAPSULE, DELAYED RELEASE ORAL DAILY
COMMUNITY
Start: 2019-10-21 | End: 2020-08-07

## 2019-10-21 RX ORDER — ERGOCALCIFEROL 1.25 MG/1
50000 CAPSULE ORAL WEEKLY
Qty: 12 CAPSULE | Refills: 0 | Status: SHIPPED | OUTPATIENT
Start: 2019-10-21 | End: 2020-08-07

## 2019-10-21 RX ORDER — FAMOTIDINE 40 MG/1
40 TABLET, FILM COATED ORAL 2 TIMES DAILY
Qty: 30 TABLET | Refills: 3 | Status: SHIPPED | OUTPATIENT
Start: 2019-10-21 | End: 2019-10-21 | Stop reason: SDUPTHER

## 2019-10-21 ASSESSMENT — ENCOUNTER SYMPTOMS
CONSTIPATION: 0
PALPITATIONS: 0
LIGHT-HEADEDNESS: 0
WEAKNESS: 0
FATIGUE: 0
VOMITING: 0
DIZZINESS: 0
DIFFICULTY URINATING: 0
SHORTNESS OF BREATH: 0
HEADACHES: 0
NAUSEA: 0
ABDOMINAL PAIN: 0
BLOOD IN STOOL: 0
FEVER: 0
COUGH: 0
TROUBLE SWALLOWING: 0
DIARRHEA: 0
WHEEZING: 0
UNEXPECTED WEIGHT CHANGE: 0
ADENOPATHY: 0
ABDOMINAL DISTENTION: 0

## 2019-10-21 NOTE — PROGRESS NOTES
Daily Progress Note       Patient ID: Kar Nicholas is a 54 y.o. female.    HPI    54 year old presents for follow up visit.      Due to severe morbid obesity and inability to lose weight with multiple attempt - she was considering weight loss surgery. She was referred to Dr Rio Ramirez in [place]. Followed up on 5/16/2019. There was interest in lap vertical sleeve gastrectomy and lap Sydnie-en-Y gastric bypass. Further work up and evaluation was ordered.      EGD done on 7/2/2019 - Normal.   Colonoscopy done on 7/2/2019 - one 3 mm polyp - recommended 6 month follow up 2/2 poor prep      Last visit she reported she did not want to proceed. She has now been thinking about it and would like to proceed again.       Hx of Schizophrenia, Bipolar - Manic, PTSD and Anxiety - has been following up with Psychiatry - part of [facility] - RATNA Fowler. Also doing group therapy Currently on seroquel and Cymbalta. Weaned off of Effexor XR.  States doing well on current regimen. Follows up with therapist twice weekly - group/individual sessions.      Hypertension - currently on losartan 50 mg daily - tolerating with no complaints. Does not check BP at home      Hypercholesterolemia - currently on atorvastatin 10 mg qhs - tolerating with no complaints.      GERD - last visit attempted to switch from omeprazole to ranitidine. She reports significantly worsening symptons on the ranitidine - especially at night.      Migraines - reports well controlled at this time - usually will take advil with onset and take imitrex if significant. Reports last migraine was about 1-2 months ago. Had previously been 2-3x/week. Usually located frontal or occipital areas - throbbing sensation. + Light and sound sensitivity and Nausea. Relieved by laying in dark quiet room. + Aura     Nightmares - currently on prazosin with improvement in nightmares.      Hx of alcohol and drug abuse. States has been alcohol free since October 31, 2016 and  drug free since November 11, 2016. Was on cocaine     The following have been reviewed and updated as appropriate in this visit:  Allergies  Meds  Problems       Review of Systems   Constitutional: Negative for fatigue, fever and unexpected weight change.   HENT: Negative for trouble swallowing.    Eyes: Negative for visual disturbance.   Respiratory: Negative for cough, shortness of breath and wheezing.    Cardiovascular: Negative for chest pain, palpitations and leg swelling.   Gastrointestinal: Negative for abdominal distention, abdominal pain, blood in stool, constipation, diarrhea, nausea and vomiting.   Endocrine: Negative for cold intolerance and heat intolerance.   Genitourinary: Negative for difficulty urinating.   Skin: Negative for rash.   Neurological: Negative for dizziness, syncope, weakness, light-headedness and headaches.   Hematological: Negative for adenopathy.             Current Outpatient Medications   Medication Sig Dispense Refill   • atorvastatin (LIPITOR) 10 mg tablet Take 1 tablet (10 mg total) by mouth nightly. 90 tablet 1   • DULoxetine (CYMBALTA) 60 mg capsule 1 capsule (60 mg total) daily.     • losartan (COZAAR) 50 mg tablet Take 1 tablet (50 mg total) by mouth every morning. 90 tablet 1   • prazosin (MINIPRESS) 1 mg capsule Take 1 capsule (1 mg total) by mouth 2 (two) times a day.     • QUEtiapine (SEROquel) 300 mg tablet 0.5 tablets (150 mg total) 2 (two) times a day. 60 tablet 0   • ergocalciferol (DRISDOL) 50,000 unit(1250 mcg) capsule Take 50,000 Units by mouth once a week. 12 capsule 0   • famotidine (PEPCID) 40 mg tablet Take 1 tablet (40 mg total) by mouth nightly as needed for heartburn. 30 tablet 3     No current facility-administered medications for this visit.      Past Medical History:   Diagnosis Date   • Alcoholism (CMS/HCC)     In Recovery  Since October 2016    • Anxiety    • Asthma     Remote   • Bipolar affect, depressed (CMS/HCC)    • Body mass index (BMI)  45.0-49.9, adult (CMS/[facility])    • Cocaine addiction (CMS/[facility])      None since November 2016   • Delayed emergence from general anesthesia    • Depression    • GERD (gastroesophageal reflux disease)    • History of snoring    • Hypertension    • Joint pain    • Lipid disorder    • Migraines    • Morbid obesity with BMI of 45.0-49.9, adult (CMS/[facility])    • Osteoarthritis     Knees   • Post-menopause    • Pre-diabetes    • Schizophrenia (CMS/[facility])    • Sciatica    • Sleep apnea     Uses CPAP occas- To bring CPAP Day of Sx   • Urinary incontinence      Family History   Problem Relation Age of Onset   • Colon cancer Biological Mother    • Lung cancer Biological Father    • Heart failure Biological Brother    • Heart block Biological Daughter    • Breast cancer Other    • Breast cancer Cousin      Past Surgical History:   Procedure Laterality Date   • CHOLECYSTECTOMY  2018   • COLONOSCOPY     • HYSTERECTOMY  2010    PARTIAL   • JOINT REPLACEMENT      right knee   • KNEE SURGERY Right    • REDUCTION MAMMAPLASTY Bilateral    • WISDOM TOOTH EXTRACTION       Social History     Socioeconomic History   • Marital status: Single     Spouse name: Not on file   • Number of children: Not on file   • Years of education: Not on file   • Highest education level: Not on file   Occupational History   • Not on file   Social Needs   • Financial resource strain: Not on file   • Food insecurity:     Worry: Not on file     Inability: Not on file   • Transportation needs:     Medical: Not on file     Non-medical: Not on file   Tobacco Use   • Smoking status: Current Every Day Smoker     Packs/day: 0.25     Years: 30.00     Pack years: 7.50   • Smokeless tobacco: Never Used   Substance and Sexual Activity   • Alcohol use: No     Comment: Recovering Alcoholic since 10/2016   • Drug use: No     Types: Cocaine     Comment: None since 11/2016   • Sexual activity: Defer   Lifestyle   • Physical activity:     Days per week: Not on file     Minutes per  session: Not on file   • Stress: Not on file   Relationships   • Social connections:     Talks on phone: Not on file     Gets together: Not on file     Attends Anabaptist service: Not on file     Active member of club or organization: Not on file     Attends meetings of clubs or organizations: Not on file     Relationship status: Not on file   • Intimate partner violence:     Fear of current or ex partner: Not on file     Emotionally abused: Not on file     Physically abused: Not on file     Forced sexual activity: Not on file   Other Topics Concern   • Not on file   Social History Narrative   • Not on file     Allergies   Allergen Reactions   • Shellfish Containing Products Angioedema     Other reaction(s): shellfish       Objective     Component      Latest Ref Rng & Units 10/4/2019 10/4/2019 10/4/2019 10/4/2019          10:09 AM 10:09 AM 10:09 AM 10:09 AM   Sodium      136 - 144 mEQ/L   143    Potassium      3.6 - 5.1 mEQ/L   4.3    Chloride      98 - 109 mEQ/L   106    CO2      22 - 32 mEQ/L   27    BUN      8 - 20 mg/dL   6 (L)    Creatinine      0.6 - 1.1 mg/dL   0.9    Glucose      70 - 99 mg/dL   91    Calcium      8.9 - 10.3 mg/dL   9.6    AST (SGOT)      15 - 41 IU/L   12 (L)    ALT (SGPT)      11 - 54 IU/L   12    Alkaline Phosphatase      35 - 126 IU/L   79    Total Protein      6.0 - 8.2 g/dL   6.5    Albumin      3.4 - 5.0 g/dL   3.7    Bilirubin, Total      0.3 - 1.2 mg/dL   0.7    eGFR      >=60.0 mL/min/1.73m*2   >60.0    Anion Gap      3 - 15 mEQ/L   10    Differential Type        Auto     nRBC      <=0.0 %  0.0     Immature Granulocytes      %  0.4     Neutrophils      %  60.2     Lymphocytes      %  29.7     Monocytes      %  7.3     Eosinophils      %  1.5     Basophils      %  0.9     Immature Granulocytes, Absolute      0.00 - 0.08 K/uL  0.02     Neutrophils, Absolute      1.70 - 7.00 K/uL  2.73     Lymphocytes, Absolute      1.20 - 3.50 K/uL  1.35     Monocytes, Absolute      0.28 - 0.80 K/uL   0.33     Eosinophils, Absolute      0.04 - 0.36 K/uL  0.07     Basophils, Absolute      0.01 - 0.10 K/uL  0.04     WBC      3.80 - 10.50 K/uL 4.54      RBC      3.93 - 5.22 M/uL 4.47      Hemoglobin      11.8 - 15.7 g/dL 12.5      Hematocrit      35.0 - 45.0 % 40.0      MCV      83.0 - 98.0 fL 89.5      MCH      28.0 - 33.2 pg 28.0      MCHC      32.2 - 35.5 g/dL 31.3 (L)      RDW      11.7 - 14.4 % 13.3      Platelets      150 - 369 K/uL 245      MPV      9.4 - 12.3 fL 12.1      Triglycerides      30 - 149 mg/dL    80   Cholesterol      <=200 mg/dL    173   HDL      >=55 mg/dL    52 (L)   LDL Calculated      <=100 mg/dL    105 (H)   Non-HDL, Calculated      mg/dL    121   RISK      <=5.0    3.3   Iron      35 - 150 ug/dL       TIBC      270 - 460 ug/dL       UIBC      180 - 360 ug/dL       Iron Saturation      15 - 45 %       Microalb, Ur      <20.0 mg/L       Creatinine, Urine      mg/dL       Microalb/Creat Ratio      <30.0 ug/mg       Hemoglobin A1C      <5.7 %       Estimated Ave Glucose      mg/dL       PTH      12.0 - 88.0 pg/mL       TSH      0.34 - 5.60 mIU/L       Ferritin      11 - 250 ng/mL       Folate      >=5.8 ng/mL       Prealbumin      18.0 - 38.0 mg/dL       Vitamin B-12      180 - 914 pg/mL       Vit D, 25-Hydroxy      30 - 100 ng/mL         Component      Latest Ref Rng & Units 10/4/2019 10/4/2019 10/4/2019 10/4/2019          10:09 AM 10:09 AM 10:09 AM 10:09 AM   Sodium      136 - 144 mEQ/L       Potassium      3.6 - 5.1 mEQ/L       Chloride      98 - 109 mEQ/L       CO2      22 - 32 mEQ/L       BUN      8 - 20 mg/dL       Creatinine      0.6 - 1.1 mg/dL       Glucose      70 - 99 mg/dL       Calcium      8.9 - 10.3 mg/dL       AST (SGOT)      15 - 41 IU/L       ALT (SGPT)      11 - 54 IU/L       Alkaline Phosphatase      35 - 126 IU/L       Total Protein      6.0 - 8.2 g/dL       Albumin      3.4 - 5.0 g/dL       Bilirubin, Total      0.3 - 1.2 mg/dL       eGFR      >=60.0 mL/min/1.73m*2        Anion Gap      3 - 15 mEQ/L       Differential Type             nRBC      <=0.0 %       Immature Granulocytes      %       Neutrophils      %       Lymphocytes      %       Monocytes      %       Eosinophils      %       Basophils      %       Immature Granulocytes, Absolute      0.00 - 0.08 K/uL       Neutrophils, Absolute      1.70 - 7.00 K/uL       Lymphocytes, Absolute      1.20 - 3.50 K/uL       Monocytes, Absolute      0.28 - 0.80 K/uL       Eosinophils, Absolute      0.04 - 0.36 K/uL       Basophils, Absolute      0.01 - 0.10 K/uL       WBC      3.80 - 10.50 K/uL       RBC      3.93 - 5.22 M/uL       Hemoglobin      11.8 - 15.7 g/dL       Hematocrit      35.0 - 45.0 %       MCV      83.0 - 98.0 fL       MCH      28.0 - 33.2 pg       MCHC      32.2 - 35.5 g/dL       RDW      11.7 - 14.4 %       Platelets      150 - 369 K/uL       MPV      9.4 - 12.3 fL       Triglycerides      30 - 149 mg/dL       Cholesterol      <=200 mg/dL       HDL      >=55 mg/dL       LDL Calculated      <=100 mg/dL       Non-HDL, Calculated      mg/dL       RISK      <=5.0       Iron      35 - 150 ug/dL   81    TIBC      270 - 460 ug/dL   318    UIBC      180 - 360 ug/dL   237    Iron Saturation      15 - 45 %   25    Microalb, Ur      <20.0 mg/L       Creatinine, Urine      mg/dL       Microalb/Creat Ratio      <30.0 ug/mg       Hemoglobin A1C      <5.7 %  6.1 (H)     Estimated Ave Glucose      mg/dL  128     PTH      12.0 - 88.0 pg/mL       TSH      0.34 - 5.60 mIU/L    1.37   Ferritin      11 - 250 ng/mL       Folate      >=5.8 ng/mL       Prealbumin      18.0 - 38.0 mg/dL 21.9      Vitamin B-12      180 - 914 pg/mL       Vit D, 25-Hydroxy      30 - 100 ng/mL         Component      Latest Ref Rng & Units 10/4/2019 10/4/2019 10/4/2019 10/4/2019          10:09 AM 10:09 AM 10:09 AM 10:09 AM   Sodium      136 - 144 mEQ/L       Potassium      3.6 - 5.1 mEQ/L       Chloride      98 - 109 mEQ/L       CO2      22 - 32 mEQ/L       BUN       8 - 20 mg/dL       Creatinine      0.6 - 1.1 mg/dL       Glucose      70 - 99 mg/dL       Calcium      8.9 - 10.3 mg/dL   9.6    AST (SGOT)      15 - 41 IU/L       ALT (SGPT)      11 - 54 IU/L       Alkaline Phosphatase      35 - 126 IU/L       Total Protein      6.0 - 8.2 g/dL       Albumin      3.4 - 5.0 g/dL       Bilirubin, Total      0.3 - 1.2 mg/dL       eGFR      >=60.0 mL/min/1.73m*2       Anion Gap      3 - 15 mEQ/L       Differential Type             nRBC      <=0.0 %       Immature Granulocytes      %       Neutrophils      %       Lymphocytes      %       Monocytes      %       Eosinophils      %       Basophils      %       Immature Granulocytes, Absolute      0.00 - 0.08 K/uL       Neutrophils, Absolute      1.70 - 7.00 K/uL       Lymphocytes, Absolute      1.20 - 3.50 K/uL       Monocytes, Absolute      0.28 - 0.80 K/uL       Eosinophils, Absolute      0.04 - 0.36 K/uL       Basophils, Absolute      0.01 - 0.10 K/uL       WBC      3.80 - 10.50 K/uL       RBC      3.93 - 5.22 M/uL       Hemoglobin      11.8 - 15.7 g/dL       Hematocrit      35.0 - 45.0 %       MCV      83.0 - 98.0 fL       MCH      28.0 - 33.2 pg       MCHC      32.2 - 35.5 g/dL       RDW      11.7 - 14.4 %       Platelets      150 - 369 K/uL       MPV      9.4 - 12.3 fL       Triglycerides      30 - 149 mg/dL       Cholesterol      <=200 mg/dL       HDL      >=55 mg/dL       LDL Calculated      <=100 mg/dL       Non-HDL, Calculated      mg/dL       RISK      <=5.0       Iron      35 - 150 ug/dL       TIBC      270 - 460 ug/dL       UIBC      180 - 360 ug/dL       Iron Saturation      15 - 45 %       Microalb, Ur      <20.0 mg/L       Creatinine, Urine      mg/dL       Microalb/Creat Ratio      <30.0 ug/mg       Hemoglobin A1C      <5.7 %       Estimated Ave Glucose      mg/dL       PTH      12.0 - 88.0 pg/mL   80.5    TSH      0.34 - 5.60 mIU/L    1.37   Ferritin      11 - 250 ng/mL 52      Folate      >=5.8 ng/mL  18.6      Prealbumin      18.0 - 38.0 mg/dL       Vitamin B-12      180 - 914 pg/mL       Vit D, 25-Hydroxy      30 - 100 ng/mL         Component      Latest Ref Rng & Units 10/4/2019 10/4/2019 10/4/2019          10:09 AM 10:09 AM 10:18 AM   Sodium      136 - 144 mEQ/L      Potassium      3.6 - 5.1 mEQ/L      Chloride      98 - 109 mEQ/L      CO2      22 - 32 mEQ/L      BUN      8 - 20 mg/dL      Creatinine      0.6 - 1.1 mg/dL      Glucose      70 - 99 mg/dL      Calcium      8.9 - 10.3 mg/dL      AST (SGOT)      15 - 41 IU/L      ALT (SGPT)      11 - 54 IU/L      Alkaline Phosphatase      35 - 126 IU/L      Total Protein      6.0 - 8.2 g/dL      Albumin      3.4 - 5.0 g/dL      Bilirubin, Total      0.3 - 1.2 mg/dL      eGFR      >=60.0 mL/min/1.73m*2      Anion Gap      3 - 15 mEQ/L      Differential Type            nRBC      <=0.0 %      Immature Granulocytes      %      Neutrophils      %      Lymphocytes      %      Monocytes      %      Eosinophils      %      Basophils      %      Immature Granulocytes, Absolute      0.00 - 0.08 K/uL      Neutrophils, Absolute      1.70 - 7.00 K/uL      Lymphocytes, Absolute      1.20 - 3.50 K/uL      Monocytes, Absolute      0.28 - 0.80 K/uL      Eosinophils, Absolute      0.04 - 0.36 K/uL      Basophils, Absolute      0.01 - 0.10 K/uL      WBC      3.80 - 10.50 K/uL      RBC      3.93 - 5.22 M/uL      Hemoglobin      11.8 - 15.7 g/dL      Hematocrit      35.0 - 45.0 %      MCV      83.0 - 98.0 fL      MCH      28.0 - 33.2 pg      MCHC      32.2 - 35.5 g/dL      RDW      11.7 - 14.4 %      Platelets      150 - 369 K/uL      MPV      9.4 - 12.3 fL      Triglycerides      30 - 149 mg/dL      Cholesterol      <=200 mg/dL      HDL      >=55 mg/dL      LDL Calculated      <=100 mg/dL      Non-HDL, Calculated      mg/dL      RISK      <=5.0      Iron      35 - 150 ug/dL      TIBC      270 - 460 ug/dL      UIBC      180 - 360 ug/dL      Iron Saturation      15 - 45 %      Microalb, Ur      " <20.0 mg/L   2.5   Creatinine, Urine      mg/dL   130.7   Microalb/Creat Ratio      <30.0 ug/mg   1.9   Hemoglobin A1C      <5.7 %      Estimated Ave Glucose      mg/dL      PTH      12.0 - 88.0 pg/mL      TSH      0.34 - 5.60 mIU/L      Ferritin      11 - 250 ng/mL      Folate      >=5.8 ng/mL      Prealbumin      18.0 - 38.0 mg/dL      Vitamin B-12      180 - 914 pg/mL 398     Vit D, 25-Hydroxy      30 - 100 ng/mL  16 (L)        Vitals:    10/21/19 1443   BP: 128/80   Pulse: 84   Resp: 18   Temp: 36.8 °C (98.2 °F)   SpO2: 98%   Weight: 133 kg (294 lb)   Height: 1.727 m (5' 8\")         Physical Exam   Constitutional: She is oriented to person, place, and time. She appears well-developed and well-nourished.   HENT:   Head: Normocephalic and atraumatic.   Eyes: Pupils are equal, round, and reactive to light. Conjunctivae and EOM are normal.   Neck: Normal range of motion. Neck supple.   Cardiovascular: Normal rate, regular rhythm and normal heart sounds.   Pulmonary/Chest: Effort normal and breath sounds normal. No respiratory distress.   Abdominal: Soft. Bowel sounds are normal. She exhibits no distension. There is no tenderness.   Musculoskeletal: Normal range of motion. She exhibits no edema.   Neurological: She is alert and oriented to person, place, and time.   Skin: Skin is warm and dry.   Nursing note and vitals reviewed.      Assessment/Plan   Problem List Items Addressed This Visit        Circulatory    Essential hypertension    Current Assessment & Plan     Well controlled. Counseled patient about DASH diet, moderate exercise and weight loss          Relevant Medications    losartan (COZAAR) 50 mg tablet       Digestive    Gastroesophageal reflux disease - Primary    Current Assessment & Plan     Reflux precautions given. Will stop ranitidine. Will give a trial of famotidine 40 mg qhs to see if symptoms improve. If symptoms persists - will restart on omeprazole 20 mg daily. She was counseled about " potential long term effects of omeprazole          Relevant Medications    famotidine (PEPCID) 40 mg tablet    Vitamin D deficiency    Current Assessment & Plan     Will start on Ergocalciferol 50,000 IU once weekly x 12 weeks. Then Vitamin D 2000 IU once daily          Relevant Medications    ergocalciferol (DRISDOL) 50,000 unit(1250 mcg) capsule       Endocrine/Metabolic    Hypercholesterolemia    Current Assessment & Plan     Counseled patient about therapeutic lifestyle changes          Relevant Medications    atorvastatin (LIPITOR) 10 mg tablet       Other    Depression    Current Assessment & Plan     Continue follow up with Psychiatry.          Relevant Medications    DULoxetine (CYMBALTA) 60 mg capsule    QUEtiapine (SEROquel) 300 mg tablet    Anxiety    Relevant Medications    DULoxetine (CYMBALTA) 60 mg capsule    QUEtiapine (SEROquel) 300 mg tablet    Bipolar disorder, manic (CMS/HCC)    Current Assessment & Plan     Continue follow up with Psychiatry.         Relevant Medications    DULoxetine (CYMBALTA) 60 mg capsule    QUEtiapine (SEROquel) 300 mg tablet    PTSD (post-traumatic stress disorder)    Current Assessment & Plan     Continue follow up with Psychiatry.          Relevant Medications    DULoxetine (CYMBALTA) 60 mg capsule    QUEtiapine (SEROquel) 300 mg tablet    Schizophrenia (CMS/HCC)    Current Assessment & Plan     Continue follow up with Psychiatry.          Relevant Medications    DULoxetine (CYMBALTA) 60 mg capsule    QUEtiapine (SEROquel) 300 mg tablet    Borderline type 2 diabetes mellitus    Current Assessment & Plan     Counseled patient about the importance of low carb diet, moderate exercise and weight loss. Decrease intake of bread, rice, pasta, starches, juice, soda and sweets.           Labs reviewed with patient     Prosper Grullon MD  10/21/2019

## 2019-10-21 NOTE — ASSESSMENT & PLAN NOTE
Reflux precautions given. Will stop ranitidine. Will give a trial of famotidine 40 mg qhs to see if symptoms improve. If symptoms persists - will restart on omeprazole 20 mg daily. She was counseled about potential long term effects of omeprazole

## 2019-10-28 ENCOUNTER — TELEPHONE (OUTPATIENT)
Dept: FAMILY MEDICINE | Facility: CLINIC | Age: 54
End: 2019-10-28

## 2019-10-28 NOTE — TELEPHONE ENCOUNTER
Pt calling to let Dr Grullon know that the new med (famotidine) is not helping and that she went back to taking omeprazole. Wants to know if this is OK. Please advise. Thanks

## 2019-10-28 NOTE — TELEPHONE ENCOUNTER
Dr. Serena Lakhani reports that she still has terrible heart burn when taking Famotidine 40mg nightly; burning feeling only, not choking like she previously experienced was when on ranitidine. She started omeprazole 20mg again yesterday and her burning sensation has subsided. She has 13 days left of omeprazole left. Please advise on recommendations moving fwd.

## 2019-10-29 RX ORDER — OMEPRAZOLE 20 MG/1
20 CAPSULE, DELAYED RELEASE ORAL
Qty: 90 CAPSULE | Refills: 0 | Status: SHIPPED | OUTPATIENT
Start: 2019-10-29 | End: 2020-03-11

## 2019-10-29 NOTE — TELEPHONE ENCOUNTER
I talked to Kar and relayed Dr. Grullon's message. She is agreeable to d/c famotidine and restart Omeprazole. RX sent to pharmacy.

## 2019-12-16 NOTE — TELEPHONE ENCOUNTER
Prescription sent to [facility]    Patient is a 81 y/o male received to room18, ambulatory c/o SOB and pain 7/7 when patient is breathing in. Patient states on exertion no discomfort. PMH DM and asthma. Patient recently admitted here for pneumonia on 11/29 to organism. Patient having difficulty sleeping at night due to pain on R side when taking deep breaths in. Patient's son at the bedside. Patient's respirations are even and unlabored at this time. Bilateral breath sounds clear on auscultation. O2 saturation 100% on rm air. Pulses 2+. VSS. Bed in lowest position. Safety maintained. Will continue to monitor. Patient is a 81 y/o male received to room18, ambulatory c/o SOB and pain 7/7 when patient is breathing in. Patient states on exertion no discomfort. PMH DM and asthma. Patient recently admitted here for pneumonia on 11/29 to organism. Patient having difficulty sleeping at night due to pain on R side when taking deep breaths in. Patient's son at the bedside. Patient's respirations are even and unlabored at this time. Bilateral breath sounds clear on auscultation. O2 saturation 100% on rm air. Pulses 2+. VSS. 20 G placed in patient's L AC. Labs drawn and sent. Bed in lowest position. Safety maintained. Will continue to monitor.

## 2020-03-11 RX ORDER — OMEPRAZOLE 20 MG/1
CAPSULE, DELAYED RELEASE ORAL
Qty: 30 CAPSULE | Refills: 0 | Status: SHIPPED | OUTPATIENT
Start: 2020-03-11 | End: 2020-04-06

## 2020-04-06 RX ORDER — OMEPRAZOLE 20 MG/1
CAPSULE, DELAYED RELEASE ORAL
Qty: 30 CAPSULE | Refills: 0 | Status: SHIPPED | OUTPATIENT
Start: 2020-04-06 | End: 2020-05-04

## 2020-05-04 RX ORDER — OMEPRAZOLE 20 MG/1
CAPSULE, DELAYED RELEASE ORAL
Qty: 30 CAPSULE | Refills: 0 | Status: SHIPPED | OUTPATIENT
Start: 2020-05-04 | End: 2020-06-05

## 2020-05-04 NOTE — TELEPHONE ENCOUNTER
Will refill for another #30 days, pt will need to reschedule by then, if not we can send a letter as she has already been contacted 3x

## 2020-05-05 NOTE — TELEPHONE ENCOUNTER
Left another message advising we can refill for another 30 days but need to reschedule med check appt. asap.

## 2020-06-05 DIAGNOSIS — I10 ESSENTIAL HYPERTENSION: ICD-10-CM

## 2020-06-05 DIAGNOSIS — E78.00 HYPERCHOLESTEROLEMIA: ICD-10-CM

## 2020-06-05 RX ORDER — LOSARTAN POTASSIUM 50 MG/1
TABLET ORAL
Qty: 30 TABLET | Refills: 0 | Status: SHIPPED | OUTPATIENT
Start: 2020-06-05 | End: 2020-07-07

## 2020-06-05 RX ORDER — ATORVASTATIN CALCIUM 10 MG/1
TABLET, FILM COATED ORAL
Qty: 30 TABLET | Refills: 0 | Status: SHIPPED | OUTPATIENT
Start: 2020-06-05 | End: 2020-07-07

## 2020-06-05 RX ORDER — OMEPRAZOLE 20 MG/1
CAPSULE, DELAYED RELEASE ORAL
Qty: 30 CAPSULE | Refills: 0 | Status: SHIPPED | OUTPATIENT
Start: 2020-06-05 | End: 2020-07-07

## 2020-07-07 DIAGNOSIS — I10 ESSENTIAL HYPERTENSION: ICD-10-CM

## 2020-07-07 DIAGNOSIS — E78.00 HYPERCHOLESTEROLEMIA: ICD-10-CM

## 2020-07-07 RX ORDER — OMEPRAZOLE 20 MG/1
CAPSULE, DELAYED RELEASE ORAL
Qty: 30 CAPSULE | Refills: 0 | Status: SHIPPED | OUTPATIENT
Start: 2020-07-07 | End: 2020-08-04

## 2020-07-07 RX ORDER — LOSARTAN POTASSIUM 50 MG/1
TABLET ORAL
Qty: 30 TABLET | Refills: 0 | Status: SHIPPED | OUTPATIENT
Start: 2020-07-07 | End: 2020-08-04

## 2020-07-07 RX ORDER — ATORVASTATIN CALCIUM 10 MG/1
TABLET, FILM COATED ORAL
Qty: 30 TABLET | Refills: 0 | Status: SHIPPED | OUTPATIENT
Start: 2020-07-07 | End: 2020-08-04

## 2020-07-07 NOTE — TELEPHONE ENCOUNTER
Please call patient and book f/u med check- she was due back in Jan 2020 but no-showed for her appt. This is her 4th request.

## 2020-07-13 NOTE — TELEPHONE ENCOUNTER
Left final message for patient to schedule.  Warned that med refills could be affected.  Used home # - [id number]

## 2020-08-07 ENCOUNTER — TELEMEDICINE (OUTPATIENT)
Dept: FAMILY MEDICINE | Facility: CLINIC | Age: 55
End: 2020-08-07
Payer: COMMERCIAL

## 2020-08-07 DIAGNOSIS — F20.9 SCHIZOPHRENIA, UNSPECIFIED TYPE: ICD-10-CM

## 2020-08-07 DIAGNOSIS — F31.10 BIPOLAR AFFECTIVE DISORDER, CURRENT EPISODE MANIC, CURRENT EPISODE SEVERITY UNSPECIFIED (CMS/HCC): ICD-10-CM

## 2020-08-07 DIAGNOSIS — R73.03 PREDIABETES: ICD-10-CM

## 2020-08-07 DIAGNOSIS — I10 ESSENTIAL HYPERTENSION: ICD-10-CM

## 2020-08-07 DIAGNOSIS — F33.9 EPISODE OF RECURRENT MAJOR DEPRESSIVE DISORDER, UNSPECIFIED DEPRESSION EPISODE SEVERITY (CMS/HCC): ICD-10-CM

## 2020-08-07 DIAGNOSIS — N64.4 BREAST PAIN: Primary | ICD-10-CM

## 2020-08-07 DIAGNOSIS — E78.00 HYPERCHOLESTEROLEMIA: ICD-10-CM

## 2020-08-07 DIAGNOSIS — Z12.11 ENCOUNTER FOR COLORECTAL CANCER SCREENING: ICD-10-CM

## 2020-08-07 DIAGNOSIS — E55.9 VITAMIN D DEFICIENCY: ICD-10-CM

## 2020-08-07 DIAGNOSIS — Z12.12 ENCOUNTER FOR COLORECTAL CANCER SCREENING: ICD-10-CM

## 2020-08-07 DIAGNOSIS — Z12.39 ENCOUNTER FOR SCREENING FOR MALIGNANT NEOPLASM OF BREAST: ICD-10-CM

## 2020-08-07 DIAGNOSIS — F43.10 PTSD (POST-TRAUMATIC STRESS DISORDER): ICD-10-CM

## 2020-08-07 DIAGNOSIS — F41.9 ANXIETY: ICD-10-CM

## 2020-08-07 DIAGNOSIS — K21.9 GASTROESOPHAGEAL REFLUX DISEASE WITHOUT ESOPHAGITIS: ICD-10-CM

## 2020-08-07 PROCEDURE — 99442 PR PHYS/QHP TELEPHONE EVALUATION 11-20 MIN: CPT | Mod: 95 | Performed by: FAMILY MEDICINE

## 2020-08-07 RX ORDER — ACETAMINOPHEN 500 MG
2000 TABLET ORAL DAILY
COMMUNITY
End: 2021-04-20

## 2020-08-07 RX ORDER — OMEPRAZOLE 20 MG/1
20 CAPSULE, DELAYED RELEASE ORAL
Qty: 30 CAPSULE | Refills: 2 | Status: SHIPPED | OUTPATIENT
Start: 2020-08-07 | End: 2020-11-02

## 2020-08-07 RX ORDER — QUETIAPINE FUMARATE 300 MG/1
150 TABLET, FILM COATED ORAL 2 TIMES DAILY
Qty: 60 TABLET | Refills: 0 | COMMUNITY
Start: 2020-08-07 | End: 2021-04-20

## 2020-08-07 RX ORDER — ATORVASTATIN CALCIUM 10 MG/1
10 TABLET, FILM COATED ORAL NIGHTLY
Qty: 30 TABLET | Refills: 2 | Status: SHIPPED | OUTPATIENT
Start: 2020-08-07 | End: 2020-11-02

## 2020-08-07 RX ORDER — DULOXETIN HYDROCHLORIDE 60 MG/1
60 CAPSULE, DELAYED RELEASE ORAL DAILY
COMMUNITY
Start: 2020-08-07 | End: 2021-04-20

## 2020-08-07 RX ORDER — LOSARTAN POTASSIUM 50 MG/1
50 TABLET ORAL EVERY MORNING
Qty: 30 TABLET | Refills: 2 | Status: SHIPPED | OUTPATIENT
Start: 2020-08-07 | End: 2020-11-02

## 2020-08-07 ASSESSMENT — ENCOUNTER SYMPTOMS
COUGH: 0
ABDOMINAL PAIN: 0
PALPITATIONS: 0
ARTHRALGIAS: 1
NAUSEA: 0
DIARRHEA: 0
WHEEZING: 0
FATIGUE: 0
CHEST TIGHTNESS: 0
FEVER: 0
CONSTIPATION: 0
BACK PAIN: 1
VOMITING: 0
SHORTNESS OF BREATH: 0

## 2020-08-07 NOTE — ASSESSMENT & PLAN NOTE
Reflux precautions given.. Unable to tolerate switch from PPI to H2 blocker.  On lower dose PPI.  Hopefully symptoms will improve after weight loss surgery

## 2020-08-07 NOTE — PROGRESS NOTES
Verification of Patient Location:  The patient affirms they are currently located in the following state: [place]     Request for Consent:   Audio Only Encounter   You and I are about to have a telemedicine check-in or visit. This is allowed because you have requested it. This telemedicine visit will be billed to your health insurance or you, if you are self-insured. You understand you will be responsible for any copayments or coinsurances that apply to your telemedicine visit. Before starting our telemedicine visit, I am required to get your consent for this virtual check-in or visit by telemedicine. Do you consent?    Patient Response to Request for Consent:  Yes    Subjective   Kar Nicholas is a 55 y.o. female being called via telemedicine for follow-up    HPI    Due to severe morbid obesity and inability to lose weight with multiple attempt - she was considering weight loss surgery. She was referred to Dr Rio Ramirez in [place]. Followed up on 5/16/2019. There was interest in lap vertical sleeve gastrectomy and lap Sydnie-en-Y gastric bypass. Further work up and evaluation was done.  She is planning to proceed with surgery.      EGD done on 7/2/2019 - Normal.   Colonoscopy done on 7/2/2019 - one 3 mm polyp - recommended 6 month follow up 2/2 poor prep. Not yet done.      She reports this week she has been having bilateral breast pain. Intermittent in nature.      Hx of Schizophrenia, Bipolar - Manic, PTSD and Anxiety - has been following up with Psychiatry - part of [facility] - RATNA Fowler. Also doing group therapy Currently on seroquel and Cymbalta. States doing well on current regimen. Follows up with therapist twice weekly - group/individual sessions.      Hypertension - currently on losartan 50 mg daily - tolerating with no complaints. Does not check BP at home      Hypercholesterolemia - currently on atorvastatin 10 mg qhs - tolerating with no complaints.      GERD - attempted to switch  from omeprazole to ranitidine. She reports significantly worsening symptons on the ranitidine - especially at night.     Vitamin D Deficiency - was on Ergocalciferol 50,000 IU once weekly x 12 weeks, now on Vitamin D 2000 IU once daily.      Migraines - reports well controlled at this time - usually will take advil with onset and take imitrex if significant. Reports last migraine was about 1-2 months ago. Had previously been 2-3x/week. Usually located frontal or occipital areas - throbbing sensation. + Light and sound sensitivity and Nausea. Relieved by laying in dark quiet room. + Aura     Hx of alcohol and drug abuse. States has been alcohol free since October 31, 2016 and drug free since November 11, 2016. Was on cocaine     The following have been reviewed and updated as appropriate in this visit:  Allergies  Meds  Problems       ?  Review of Systems   Constitutional: Negative for fatigue and fever.   Respiratory: Negative for cough, chest tightness, shortness of breath and wheezing.    Cardiovascular: Negative for chest pain and palpitations.   Gastrointestinal: Negative for abdominal pain, constipation, diarrhea, nausea and vomiting.   Musculoskeletal: Positive for arthralgias and back pain.     ?  Objective     No apparent distress.  Mood is appropriate.     Kar Nicholas is a 55 y.o. female being called via telemedicine.      Assessment/Plan   Problem List Items Addressed This Visit        Circulatory    Essential hypertension    Current Assessment & Plan     Counseled patient about DASH diet, moderate exercise and weight loss          Relevant Medications    losartan (COZAAR) 50 mg tablet    Other Relevant Orders    CBC and Differential    Comprehensive metabolic panel    Lipid panel    Microalbumin/Creatinine Ur Random    TSH w reflex FT4       Digestive    Gastroesophageal reflux disease    Current Assessment & Plan     Reflux precautions given.. Unable to tolerate switch from PPI to H2 blocker.  On  lower dose PPI.  Hopefully symptoms will improve after weight loss surgery         Relevant Medications    omeprazole (PriLOSEC) 20 mg capsule    Vitamin D deficiency    Relevant Medications    cholecalciferol, vitamin D3, 50 mcg (2,000 unit) capsule    Other Relevant Orders    Vitamin D 25 hydroxy       Endocrine/Metabolic    Hypercholesterolemia    Current Assessment & Plan     Counseled patient about therapeutic lifestyle changes          Relevant Medications    atorvastatin (LIPITOR) 10 mg tablet    Other Relevant Orders    Comprehensive metabolic panel    Lipid panel    Prediabetes    Current Assessment & Plan     Counseled patient about the importance of low carb diet, moderate exercise and weight loss. Decrease intake of bread, rice, pasta, starches, juice, soda and sweets.         Relevant Orders    Comprehensive metabolic panel    Hemoglobin A1c    Microalbumin/Creatinine Ur Random       Other    Depression    Current Assessment & Plan     Continue follow up with Psychiatry.          Relevant Medications    QUEtiapine (SEROquel) 300 mg tablet    DULoxetine (CYMBALTA) 60 mg capsule    Anxiety    Current Assessment & Plan     Continue follow up with Psychiatry.          Relevant Medications    QUEtiapine (SEROquel) 300 mg tablet    DULoxetine (CYMBALTA) 60 mg capsule    Bipolar disorder, manic (CMS/HCC)    Current Assessment & Plan     Continue follow up with Psychiatry.         Relevant Medications    QUEtiapine (SEROquel) 300 mg tablet    DULoxetine (CYMBALTA) 60 mg capsule    PTSD (post-traumatic stress disorder)    Current Assessment & Plan     Continue follow up with Psychiatry.          Relevant Medications    QUEtiapine (SEROquel) 300 mg tablet    DULoxetine (CYMBALTA) 60 mg capsule    Schizophrenia (CMS/HCC)    Current Assessment & Plan     Continue follow up with Psychiatry.          Relevant Medications    QUEtiapine (SEROquel) 300 mg tablet    DULoxetine (CYMBALTA) 60 mg capsule      Other Visit  Diagnoses     Breast pain    -  Primary    Relevant Orders    BI DIAGNOSTIC MAMMOGRAM BILATERAL    Encounter for screening for malignant neoplasm of breast        Relevant Orders    BI DIAGNOSTIC MAMMOGRAM BILATERAL    Encounter for colorectal cancer screening        Relevant Orders    [facility] KAUSHAL Grullon MD  8/7/2020    Time Spent in Medical Discussion During This Encounter:  12 minutes

## 2020-08-21 ENCOUNTER — HOSPITAL ENCOUNTER (OUTPATIENT)
Dept: RADIOLOGY | Age: 55
Discharge: HOME | End: 2020-08-21
Attending: FAMILY MEDICINE
Payer: COMMERCIAL

## 2020-08-21 ENCOUNTER — TELEPHONE (OUTPATIENT)
Dept: FAMILY MEDICINE | Facility: CLINIC | Age: 55
End: 2020-08-21

## 2020-08-21 DIAGNOSIS — N64.4 BREAST PAIN: ICD-10-CM

## 2020-08-21 DIAGNOSIS — Z12.39 ENCOUNTER FOR SCREENING FOR MALIGNANT NEOPLASM OF BREAST: ICD-10-CM

## 2020-08-21 PROCEDURE — 76642 ULTRASOUND BREAST LIMITED: CPT | Mod: 50

## 2020-08-21 PROCEDURE — G0279 TOMOSYNTHESIS, MAMMO: HCPCS

## 2020-08-24 ENCOUNTER — TELEPHONE (OUTPATIENT)
Dept: FAMILY MEDICINE | Facility: CLINIC | Age: 55
End: 2020-08-24

## 2020-09-29 ENCOUNTER — OFFICE VISIT (OUTPATIENT)
Dept: FAMILY MEDICINE | Facility: CLINIC | Age: 55
End: 2020-09-29
Payer: COMMERCIAL

## 2020-09-29 VITALS
SYSTOLIC BLOOD PRESSURE: 130 MMHG | DIASTOLIC BLOOD PRESSURE: 80 MMHG | BODY MASS INDEX: 44.41 KG/M2 | HEART RATE: 84 BPM | OXYGEN SATURATION: 97 % | RESPIRATION RATE: 16 BRPM | TEMPERATURE: 98.2 F | HEIGHT: 68 IN | WEIGHT: 293 LBS

## 2020-09-29 DIAGNOSIS — K08.9 DENTAL DISORDER: Primary | ICD-10-CM

## 2020-09-29 PROBLEM — F17.200 NICOTINE DEPENDENCE: Status: ACTIVE | Noted: 2020-09-21

## 2020-09-29 PROBLEM — F33.2 MAJOR DEPRESSIVE DISORDER, RECURRENT SEVERE WITHOUT PSYCHOTIC FEATURES (CMS/HCC): Status: ACTIVE | Noted: 2019-11-15

## 2020-09-29 PROBLEM — M19.90 OSTEOARTHROSIS: Status: ACTIVE | Noted: 2017-12-04

## 2020-09-29 PROCEDURE — 99213 OFFICE O/P EST LOW 20 MIN: CPT | Performed by: FAMILY MEDICINE

## 2020-09-29 RX ORDER — DULOXETIN HYDROCHLORIDE 30 MG/1
CAPSULE, DELAYED RELEASE ORAL
COMMUNITY
Start: 2020-09-04 | End: 2021-04-20

## 2020-09-29 ASSESSMENT — ENCOUNTER SYMPTOMS
COUGH: 0
VOMITING: 0
WHEEZING: 0
SHORTNESS OF BREATH: 0
LIGHT-HEADEDNESS: 0
HEADACHES: 0
DIZZINESS: 0
UNEXPECTED WEIGHT CHANGE: 0
CONSTIPATION: 0
NAUSEA: 0
ABDOMINAL PAIN: 0
BLOOD IN STOOL: 0
DIFFICULTY URINATING: 0
WEAKNESS: 0
DIARRHEA: 0
ABDOMINAL DISTENTION: 0
TROUBLE SWALLOWING: 0
FEVER: 0
ADENOPATHY: 0
PALPITATIONS: 0
FATIGUE: 0

## 2020-09-29 ASSESSMENT — PAIN SCALES - GENERAL: PAINLEVEL: 0-NO PAIN

## 2020-09-29 NOTE — PROGRESS NOTES
Daily Progress Note       Patient ID: Kar Nicholas is a 55 y.o. female.    HPI    55 year old female presents for dental extraction clearance.     She recently saw her dentist and was treated for an infected tooth with antibiotics.  Recommended a dental extraction.  Due to her history of knee replacement surgery her dentist recommended clearance by her PCP.    The following have been reviewed and updated as appropriate in this visit:  Allergies  Meds  Problems       Review of Systems   Constitutional: Negative for fatigue, fever and unexpected weight change.   HENT: Negative for trouble swallowing.    Eyes: Negative for visual disturbance.   Respiratory: Negative for cough, shortness of breath and wheezing.    Cardiovascular: Negative for chest pain, palpitations and leg swelling.   Gastrointestinal: Negative for abdominal distention, abdominal pain, blood in stool, constipation, diarrhea, nausea and vomiting.   Endocrine: Negative for cold intolerance and heat intolerance.   Genitourinary: Negative for difficulty urinating.   Skin: Negative for rash.   Neurological: Negative for dizziness, syncope, weakness, light-headedness and headaches.   Hematological: Negative for adenopathy.             Current Outpatient Medications   Medication Sig Dispense Refill   • atorvastatin (LIPITOR) 10 mg tablet Take 1 tablet (10 mg total) by mouth nightly. 30 tablet 2   • cholecalciferol, vitamin D3, 50 mcg (2,000 unit) capsule Take 2,000 Units by mouth daily.     • DULoxetine (CYMBALTA) 30 mg capsule      • DULoxetine (CYMBALTA) 60 mg capsule 1 capsule (60 mg total) daily.     • losartan (COZAAR) 50 mg tablet Take 1 tablet (50 mg total) by mouth every morning. 30 tablet 2   • omeprazole (PriLOSEC) 20 mg capsule Take 1 capsule (20 mg total) by mouth daily before breakfast. 30 capsule 2   • QUEtiapine (SEROquel) 300 mg tablet 0.5 tablets (150 mg total) 2 (two) times a day. 60 tablet 0     No current facility-administered  medications for this visit.      Past Medical History:   Diagnosis Date   • Alcoholism (CMS/[facility])     In Recovery  Since October 2016    • Anxiety    • Asthma     Remote   • Bipolar affect, depressed (CMS/[facility])    • Body mass index (BMI) 45.0-49.9, adult (CMS/[facility])    • Borderline type 2 diabetes mellitus 4/15/2019   • Cocaine addiction (CMS/[facility])      None since November 2016   • Delayed emergence from general anesthesia    • Depression    • GERD (gastroesophageal reflux disease)    • History of snoring    • Hypertension    • Joint pain    • Lipid disorder    • Migraines    • Morbid obesity with BMI of 45.0-49.9, adult (CMS/[facility])    • Osteoarthritis     Knees   • Post-menopause    • Pre-diabetes    • Schizophrenia (CMS/[facility])    • Sciatica    • Sleep apnea     Uses CPAP occas- To bring CPAP Day of Sx   • Urinary incontinence      Family History   Problem Relation Age of Onset   • Colon cancer Biological Mother    • Lung cancer Biological Father    • Heart failure Biological Brother    • Heart block Biological Daughter    • Breast cancer Other    • Breast cancer Cousin      Past Surgical History:   Procedure Laterality Date   • CHOLECYSTECTOMY  2018   • COLONOSCOPY     • HYSTERECTOMY  2010    PARTIAL   • JOINT REPLACEMENT      right knee   • KNEE SURGERY Right    • REDUCTION MAMMAPLASTY Bilateral    • WISDOM TOOTH EXTRACTION       Social History     Tobacco Use   • Smoking status: Current Every Day Smoker     Packs/day: 0.25     Years: 30.00     Pack years: 7.50   • Smokeless tobacco: Never Used   Substance Use Topics   • Alcohol use: No     Comment: Recovering Alcoholic since 10/2016   • Drug use: No     Types: Cocaine     Comment: None since 11/2016     Allergies   Allergen Reactions   • Shellfish Containing Products Angioedema     Other reaction(s): shellfish       Objective   No new labs.    Vitals:    09/29/20 1146   BP: 130/80   BP Location: Left upper arm   Patient Position: Sitting   Pulse: 84   Resp: 16   Temp:  "36.8 °C (98.2 °F)   TempSrc: Temporal   SpO2: 97%   Weight: 134 kg (295 lb)   Height: 1.727 m (5' 8\")         Physical Exam  Vitals signs and nursing note reviewed.   Constitutional:       Appearance: She is well-developed.   HENT:      Head: Normocephalic and atraumatic.   Eyes:      Conjunctiva/sclera: Conjunctivae normal.      Pupils: Pupils are equal, round, and reactive to light.   Neck:      Musculoskeletal: Normal range of motion and neck supple.   Cardiovascular:      Rate and Rhythm: Normal rate and regular rhythm.      Heart sounds: Normal heart sounds.   Pulmonary:      Effort: Pulmonary effort is normal. No respiratory distress.      Breath sounds: Normal breath sounds.   Abdominal:      General: Bowel sounds are normal. There is no distension.      Palpations: Abdomen is soft.      Tenderness: There is no abdominal tenderness.   Musculoskeletal: Normal range of motion.   Skin:     General: Skin is warm and dry.   Neurological:      Mental Status: She is alert and oriented to person, place, and time.         Assessment/Plan   Problem List Items Addressed This Visit     None      Visit Diagnoses     Dental disorder    -  Primary      American Academy of Oral Medicine, the American Dental Association (ADA) in conjunction with the American Academy of Orthopedic Surgeons (AAOS), and the [place] Society for Antimicrobial Chemotherapy all advise against universal use of antimicrobial prophylaxis prior to dental procedures for prevention of PJI.    In 2013, the ADA and AAOS published a joint guideline on the prevention of orthopedic implant infections in patients undergoing dental procedures; it states that there is no convincing evidence to support routine use of prophylactic antibiotics in patients with prosthetic joints who undergo dental procedures. Subsequently, in 2015, the ADA [employer] published a clinical practice guideline to clarify the preceding ADA and AAOS joint guideline; it " states that, in general, prophylactic antibiotics are not recommended prior to dental procedures for patients with prosthetic joint implants to prevent prosthetic joint infection.    Recommended against prophylaxis antibiotics. No contraindications for dental extraction,.     Prosper Grullon MD  9/29/2020

## 2020-11-16 ENCOUNTER — TELEPHONE (OUTPATIENT)
Dept: FAMILY MEDICINE | Facility: CLINIC | Age: 55
End: 2020-11-16

## 2020-12-10 ENCOUNTER — TELEPHONE (OUTPATIENT)
Dept: FAMILY MEDICINE | Facility: CLINIC | Age: 55
End: 2020-12-10

## 2020-12-29 ENCOUNTER — TRANSCRIBE ORDERS (OUTPATIENT)
Dept: SCHEDULING | Age: 55
End: 2020-12-29

## 2020-12-29 ENCOUNTER — TELEPHONE (OUTPATIENT)
Dept: FAMILY MEDICINE | Facility: CLINIC | Age: 55
End: 2020-12-29

## 2020-12-29 NOTE — TELEPHONE ENCOUNTER
Patient is requesting a new colonoscopy script.  When patient tried to schedule she was told that her other script was .  Please advise. Thank you

## 2021-01-22 ENCOUNTER — TELEPHONE (OUTPATIENT)
Dept: FAMILY MEDICINE | Facility: CLINIC | Age: 56
End: 2021-01-22

## 2021-01-22 NOTE — TELEPHONE ENCOUNTER
Patient called and states that she was unable to have a colonoscopy done due to covid and is now trying to schedule.  Patient states that GI is requesting that she meet with the doctor before having the procedure because the previous colonoscopy was unable to be completed due to poor prep by patient.  Patient states that she has already done this and does not want to go again..... patient is asking if Dr. Grullon is agreeable to writing a note to excuse patient from an additional appointment.  Please advise.

## 2021-01-22 NOTE — TELEPHONE ENCOUNTER
Don't think a note from me makes a difference.  This would be the specialists preference if they are recommending it

## 2021-04-20 ENCOUNTER — OFFICE VISIT (OUTPATIENT)
Dept: FAMILY MEDICINE | Facility: CLINIC | Age: 56
End: 2021-04-20
Payer: COMMERCIAL

## 2021-04-20 VITALS
HEIGHT: 68 IN | TEMPERATURE: 98.2 F | BODY MASS INDEX: 44.41 KG/M2 | SYSTOLIC BLOOD PRESSURE: 130 MMHG | HEART RATE: 84 BPM | RESPIRATION RATE: 16 BRPM | OXYGEN SATURATION: 95 % | WEIGHT: 293 LBS | DIASTOLIC BLOOD PRESSURE: 80 MMHG

## 2021-04-20 DIAGNOSIS — E55.9 VITAMIN D DEFICIENCY: ICD-10-CM

## 2021-04-20 DIAGNOSIS — R73.03 PREDIABETES: ICD-10-CM

## 2021-04-20 DIAGNOSIS — F33.9 EPISODE OF RECURRENT MAJOR DEPRESSIVE DISORDER, UNSPECIFIED DEPRESSION EPISODE SEVERITY (CMS/HCC): ICD-10-CM

## 2021-04-20 DIAGNOSIS — K21.9 GASTROESOPHAGEAL REFLUX DISEASE WITHOUT ESOPHAGITIS: ICD-10-CM

## 2021-04-20 DIAGNOSIS — Z12.11 ENCOUNTER FOR COLORECTAL CANCER SCREENING: ICD-10-CM

## 2021-04-20 DIAGNOSIS — I10 ESSENTIAL HYPERTENSION: Primary | ICD-10-CM

## 2021-04-20 DIAGNOSIS — E78.00 HYPERCHOLESTEROLEMIA: ICD-10-CM

## 2021-04-20 DIAGNOSIS — G47.33 OSA ON CPAP: ICD-10-CM

## 2021-04-20 DIAGNOSIS — F20.9 SCHIZOPHRENIA, UNSPECIFIED TYPE: ICD-10-CM

## 2021-04-20 DIAGNOSIS — F43.10 PTSD (POST-TRAUMATIC STRESS DISORDER): ICD-10-CM

## 2021-04-20 DIAGNOSIS — F41.9 ANXIETY: ICD-10-CM

## 2021-04-20 DIAGNOSIS — F31.10 BIPOLAR AFFECTIVE DISORDER, CURRENT EPISODE MANIC, CURRENT EPISODE SEVERITY UNSPECIFIED (CMS/HCC): ICD-10-CM

## 2021-04-20 DIAGNOSIS — E66.01 MORBID OBESITY (CMS/HCC): ICD-10-CM

## 2021-04-20 DIAGNOSIS — Z12.12 ENCOUNTER FOR COLORECTAL CANCER SCREENING: ICD-10-CM

## 2021-04-20 PROCEDURE — 99214 OFFICE O/P EST MOD 30 MIN: CPT | Performed by: FAMILY MEDICINE

## 2021-04-20 PROCEDURE — 3008F BODY MASS INDEX DOCD: CPT | Performed by: FAMILY MEDICINE

## 2021-04-20 RX ORDER — OMEPRAZOLE 20 MG/1
20 CAPSULE, DELAYED RELEASE ORAL
Qty: 30 CAPSULE | Refills: 2
Start: 2021-04-20 | End: 2021-05-07

## 2021-04-20 RX ORDER — LOSARTAN POTASSIUM 50 MG/1
50 TABLET ORAL EVERY MORNING
Qty: 30 TABLET | Refills: 2
Start: 2021-04-20 | End: 2021-05-05

## 2021-04-20 RX ORDER — QUETIAPINE FUMARATE 25 MG/1
25 TABLET, FILM COATED ORAL 2 TIMES DAILY
COMMUNITY
End: 2022-06-03

## 2021-04-20 RX ORDER — ATORVASTATIN CALCIUM 10 MG/1
10 TABLET, FILM COATED ORAL NIGHTLY
Qty: 30 TABLET | Refills: 2
Start: 2021-04-20 | End: 2021-04-20

## 2021-04-20 RX ORDER — BUPROPION HYDROCHLORIDE 150 MG/1
TABLET ORAL
COMMUNITY
Start: 2021-04-08 | End: 2021-04-20

## 2021-04-20 RX ORDER — METFORMIN HYDROCHLORIDE 500 MG/1
TABLET ORAL
COMMUNITY
Start: 2021-04-08 | End: 2021-04-20

## 2021-04-20 RX ORDER — BUPROPION HYDROCHLORIDE 150 MG/1
150 TABLET ORAL DAILY
COMMUNITY
Start: 2021-04-20 | End: 2022-06-03

## 2021-04-20 RX ORDER — DULOXETIN HYDROCHLORIDE 60 MG/1
60 CAPSULE, DELAYED RELEASE ORAL DAILY
COMMUNITY
Start: 2021-04-20 | End: 2022-06-03

## 2021-04-20 RX ORDER — DULOXETIN HYDROCHLORIDE 30 MG/1
30 CAPSULE, DELAYED RELEASE ORAL DAILY
COMMUNITY
Start: 2021-04-20 | End: 2022-06-03

## 2021-04-20 RX ORDER — ATORVASTATIN CALCIUM 10 MG/1
10 TABLET, FILM COATED ORAL NIGHTLY
Qty: 30 TABLET | Refills: 2 | COMMUNITY
Start: 2021-04-20 | End: 2021-05-11 | Stop reason: SDUPTHER

## 2021-04-20 RX ORDER — QUETIAPINE FUMARATE 200 MG/1
200 TABLET, FILM COATED ORAL NIGHTLY
COMMUNITY
Start: 2021-04-20 | End: 2022-06-03

## 2021-04-20 ASSESSMENT — ENCOUNTER SYMPTOMS
DIARRHEA: 1
SLEEP DISTURBANCE: 1
ABDOMINAL PAIN: 1
NERVOUS/ANXIOUS: 1

## 2021-04-20 ASSESSMENT — PAIN SCALES - GENERAL: PAINLEVEL: 0-NO PAIN

## 2021-04-20 NOTE — PROGRESS NOTES
Daily Progress Note       Patient ID: Kar Nicholas is a 55 y.o. female.    HPI    55 year old female presents for follow up visit.     She has a long standing hx of diarrhea.  Occasional LLQ abdominal pain that will resolve with a BM.  She did have an EGD and colonoscopy done on 7/2/2019.  EGD was normal.  Colonoscopy was poor prep and recommended repeat in 6 months.  Never done after due to the pandemic.     Due to severe morbid obesity and inability to lose weight with multiple attempt - she was considering weight loss surgery.  She is currently following up with [facility].      Hx of Schizophrenia, Bipolar - Manic, PTSD and Anxiety - has been following up with Psychiatry - part of [facility] - RATNA Fowler. Also doing group therapy. Currently on seroquel, Wellbutrin and Cymbalta. Not sure if she is going to continue follow up with [facility].  They have referred her out and she is not sure if she wants to restart again.  She states in the past they had discussed she could benefit in a support animal.  She is requesting a letter for her to have a cat when she finds new housing.  She feels like the cat would give her purpose, give her something to love and take care of.  Feels like it will help her with her anxiety and other psychiatric conditions.     Essential Hypertension - currently on losartan 50 mg daily - tolerating with no complaints. Does not check BP at home      Hypercholesterolemia - currently on atorvastatin 10 mg qhs - tolerating with no complaints.      GERD - currently on omeprazole 20 mg daily.  Attempted to switch to H2 blocker but had worsening of symptoms.      Vitamin D Deficiency - was on Ergocalciferol 50,000 IU once weekly x 12 weeks, then on Vitamin D 2000 IU once daily; currently not on supplementation.      Hx of alcohol and drug abuse. States had been alcohol free and drug free until recent relapse which lead to a hospitalization followed by a stay at [place]  [facility].  ER visit was on 3/27/2021.     EZEQUIEL on CPAP - found to have sleep apnea, using CPAP machine nightly.     The following have been reviewed and updated as appropriate in this visit:  Allergies  Meds  Problems       Review of Systems   Gastrointestinal: Positive for abdominal pain and diarrhea.   Psychiatric/Behavioral: Positive for sleep disturbance. Negative for self-injury and suicidal ideas. The patient is nervous/anxious.              Current Outpatient Medications   Medication Sig Dispense Refill   • atorvastatin (LIPITOR) 10 mg tablet Take 1 tablet (10 mg total) by mouth nightly. 30 tablet 2   • buPROPion XL (WELLBUTRIN XL) 150 mg 24 hr tablet 1 tablet (150 mg total) daily.     • DULoxetine (CYMBALTA) 30 mg capsule 1 capsule (30 mg total) daily.     • DULoxetine (CYMBALTA) 60 mg capsule 1 capsule (60 mg total) daily.     • losartan (COZAAR) 50 mg tablet Take 1 tablet (50 mg total) by mouth every morning. 30 tablet 2   • omeprazole (PriLOSEC) 20 mg capsule Take 1 capsule (20 mg total) by mouth daily before breakfast. 30 capsule 2   • QUEtiapine (SEROquel) 200 mg tablet 1 tablet (200 mg total) nightly.     • QUEtiapine (SEROquel) 25 mg tablet Take 25 mg by mouth 2 (two) times a day.       No current facility-administered medications for this visit.     Past Medical History:   Diagnosis Date   • Alcoholism (CMS/[facility])     In Recovery  Since October 2016    • Anxiety    • Asthma     Remote   • Bipolar affect, depressed (CMS/[facility])    • Body mass index (BMI) 45.0-49.9, adult    • Borderline type 2 diabetes mellitus 4/15/2019   • Cocaine addiction (CMS/[facility])      None since November 2016   • Delayed emergence from general anesthesia    • Depression    • GERD (gastroesophageal reflux disease)    • History of snoring    • Hypertension    • Joint pain    • Lipid disorder    • Migraines    • Morbid obesity with BMI of 45.0-49.9, adult (CMS/[facility])    • Osteoarthritis     Knees   • Post-menopause    • Pre-diabetes    •  "Schizophrenia (CMS/HCC)    • Sciatica    • Sleep apnea     Uses CPAP occas- To bring CPAP Day of Sx   • Urinary incontinence      Family History   Problem Relation Age of Onset   • Colon cancer Biological Mother    • Lung cancer Biological Father    • Heart failure Biological Brother    • Heart block Biological Daughter    • Breast cancer Other    • Breast cancer Cousin      Past Surgical History:   Procedure Laterality Date   • CHOLECYSTECTOMY  2018   • COLONOSCOPY     • HYSTERECTOMY  2010    PARTIAL   • JOINT REPLACEMENT      right knee   • KNEE SURGERY Right    • REDUCTION MAMMAPLASTY Bilateral    • WISDOM TOOTH EXTRACTION       Social History     Tobacco Use   • Smoking status: Current Every Day Smoker     Packs/day: 0.25     Years: 30.00     Pack years: 7.50   • Smokeless tobacco: Never Used   Substance Use Topics   • Alcohol use: No     Comment: Recovering Alcoholic since 10/2016   • Drug use: No     Types: Cocaine     Comment: None since 11/2016     Allergies   Allergen Reactions   • Shellfish Containing Products Angioedema     Other reaction(s): shellfish       Objective   No new labs.    Vitals:    04/20/21 1018   BP: 130/80   BP Location: Right upper arm   Patient Position: Sitting   Pulse: 84   Resp: 16   Temp: 36.8 °C (98.2 °F)   TempSrc: Temporal   SpO2: 95%   Weight: (!) 138 kg (305 lb)   Height: 1.727 m (5' 8\")         Physical Exam  Vitals and nursing note reviewed.   Constitutional:       Appearance: Normal appearance. She is well-developed.   HENT:      Head: Normocephalic and atraumatic.   Eyes:      Extraocular Movements: Extraocular movements intact.      Conjunctiva/sclera: Conjunctivae normal.   Pulmonary:      Effort: Pulmonary effort is normal.   Musculoskeletal:         General: Normal range of motion.      Cervical back: Normal range of motion.   Neurological:      General: No focal deficit present.      Mental Status: She is alert and oriented to person, place, and time.   Psychiatric:    "      Mood and Affect: Mood normal.         Behavior: Behavior normal.         Thought Content: Thought content normal.         Judgment: Judgment normal.         Assessment/Plan   Problem List Items Addressed This Visit        Respiratory    EZEQUIEL on CPAP    Current Assessment & Plan     Encouraged nightly use of CPAP.  Continue follow up with Sleep Medicine at [facility]             Circulatory    Essential hypertension - Primary    Current Assessment & Plan     Recommend low Na diet, moderate exercise and weight loss          Relevant Medications    losartan (COZAAR) 50 mg tablet    Other Relevant Orders    CBC and Differential    Comprehensive metabolic panel    Microalbumin/Creatinine Ur Random    TSH w reflex FT4       Digestive    Gastroesophageal reflux disease    Current Assessment & Plan     Unable to tolerate switch from PPI to H2 blocker.  On lower dose PPI.  Hopefully symptoms will improve after weight loss surgery         Relevant Medications    omeprazole (PriLOSEC) 20 mg capsule    Vitamin D deficiency    Current Assessment & Plan     Will get labs and address accordingly          Relevant Orders    Vitamin D 25 hydroxy       Endocrine/Metabolic    Hypercholesterolemia    Current Assessment & Plan     Continue on statin therapy          Relevant Medications    atorvastatin (LIPITOR) 10 mg tablet    Other Relevant Orders    Comprehensive metabolic panel    Lipid panel    Morbid obesity (CMS/HCC)    Current Assessment & Plan     Currently being evaluated by [facility]         Prediabetes    Current Assessment & Plan     Recommend low carb diet, moderate exercise and weight loss. Decrease intake of bread, rice, pasta, starches, juice, soda and sweets.         Relevant Orders    Hemoglobin A1c       Other    Depression    Current Assessment & Plan     Encouraged she continue follow up with Psychiatry          Relevant Medications    QUEtiapine (SEROquel) 200 mg tablet    QUEtiapine (SEROquel) 25 mg tablet     DULoxetine (CYMBALTA) 60 mg capsule    buPROPion XL (WELLBUTRIN XL) 150 mg 24 hr tablet    DULoxetine (CYMBALTA) 30 mg capsule    Anxiety    Current Assessment & Plan     Encouraged she continue follow up with Psychiatry          Relevant Medications    QUEtiapine (SEROquel) 200 mg tablet    QUEtiapine (SEROquel) 25 mg tablet    DULoxetine (CYMBALTA) 60 mg capsule    buPROPion XL (WELLBUTRIN XL) 150 mg 24 hr tablet    DULoxetine (CYMBALTA) 30 mg capsule    Bipolar disorder, manic (CMS/HCC)    Current Assessment & Plan     Encouraged she continue follow up with Psychiatry          Relevant Medications    QUEtiapine (SEROquel) 200 mg tablet    QUEtiapine (SEROquel) 25 mg tablet    DULoxetine (CYMBALTA) 60 mg capsule    buPROPion XL (WELLBUTRIN XL) 150 mg 24 hr tablet    DULoxetine (CYMBALTA) 30 mg capsule    PTSD (post-traumatic stress disorder)    Current Assessment & Plan     Encouraged she continue follow up with Psychiatry          Relevant Medications    QUEtiapine (SEROquel) 200 mg tablet    QUEtiapine (SEROquel) 25 mg tablet    DULoxetine (CYMBALTA) 60 mg capsule    buPROPion XL (WELLBUTRIN XL) 150 mg 24 hr tablet    DULoxetine (CYMBALTA) 30 mg capsule    Schizophrenia (CMS/HCC)    Current Assessment & Plan     Encouraged she continue follow up with Psychiatry          Relevant Medications    QUEtiapine (SEROquel) 200 mg tablet    QUEtiapine (SEROquel) 25 mg tablet    DULoxetine (CYMBALTA) 60 mg capsule    buPROPion XL (WELLBUTRIN XL) 150 mg 24 hr tablet    DULoxetine (CYMBALTA) 30 mg capsule      Other Visit Diagnoses     Encounter for colorectal cancer screening        Relevant Orders    [facility]        Letter for support animal will be written for the patient     Prosper Grullon MD  4/20/2021

## 2021-04-20 NOTE — ASSESSMENT & PLAN NOTE
Recommend low carb diet, moderate exercise and weight loss. Decrease intake of bread, rice, pasta, starches, juice, soda and sweets.

## 2021-04-20 NOTE — ASSESSMENT & PLAN NOTE
Unable to tolerate switch from PPI to H2 blocker.  On lower dose PPI.  Hopefully symptoms will improve after weight loss surgery

## 2021-04-20 NOTE — PATIENT INSTRUCTIONS
Patient Education     Diarrhea, Adult  Diarrhea is frequent loose and watery bowel movements. Diarrhea can make you feel weak and cause you to become dehydrated. Dehydration can make you tired and thirsty, cause you to have a dry mouth, and decrease how often you urinate.  Diarrhea typically lasts 2-3 days. However, it can last longer if it is a sign of something more serious. It is important to treat your diarrhea as told by your health care provider.  Follow these instructions at home:  Eating and drinking         Follow these recommendations as told by your health care provider:  · Take an oral rehydration solution (ORS). This is an over-the-counter medicine that helps return your body to its normal balance of nutrients and water. It is found at pharmacies and retail stores.  · Drink plenty of fluids, such as water, ice chips, diluted fruit juice, and low-calorie sports drinks. You can drink milk also, if desired.  · Avoid drinking fluids that contain a lot of sugar or caffeine, such as energy drinks, sports drinks, and soda.  · Eat bland, easy-to-digest foods in small amounts as you are able. These foods include bananas, applesauce, rice, lean meats, toast, and crackers.  · Avoid alcohol.  · Avoid spicy or fatty foods.    Medicines  · Take over-the-counter and prescription medicines only as told by your health care provider.  · If you were prescribed an antibiotic medicine, take it as told by your health care provider. Do not stop using the antibiotic even if you start to feel better.  General instructions    · Wash your hands often using soap and water. If soap and water are not available, use a hand . Others in the household should wash their hands as well. Hands should be washed:  ? After using the toilet or changing a diaper.  ? Before preparing, cooking, or serving food.  ? While caring for a sick person or while visiting someone in a hospital.  · Drink enough fluid to keep your urine pale  yellow.  · Rest at home while you recover.  · Watch your condition for any changes.  · Take a warm bath to relieve any burning or pain from frequent diarrhea episodes.  · Keep all follow-up visits as told by your health care provider. This is important.  Contact a health care provider if:  · You have a fever.  · Your diarrhea gets worse.  · You have new symptoms.  · You cannot keep fluids down.  · You feel light-headed or dizzy.  · You have a headache.  · You have muscle cramps.  Get help right away if:  · You have chest pain.  · You feel extremely weak or you faint.  · You have bloody or black stools or stools that look like tar.  · You have severe pain, cramping, or bloating in your abdomen.  · You have trouble breathing or you are breathing very quickly.  · Your heart is beating very quickly.  · Your skin feels cold and clammy.  · You feel confused.  · You have signs of dehydration, such as:  ? Dark urine, very little urine, or no urine.  ? Cracked lips.  ? Dry mouth.  ? Sunken eyes.  ? Sleepiness.  ? Weakness.  Summary  · Diarrhea is frequent loose and watery bowel movements. Diarrhea can make you feel weak and cause you to become dehydrated.  · Drink enough fluids to keep your urine pale yellow.  · Make sure that you wash your hands after using the toilet. If soap and water are not available, use hand .  · Contact a health care provider if your diarrhea gets worse or you have new symptoms.  · Get help right away if you have signs of dehydration.  This information is not intended to replace advice given to you by your health care provider. Make sure you discuss any questions you have with your health care provider.  Document Released: 12/08/2003 Document Revised: 05/05/2020 Document Reviewed: 05/24/2019  [employer] Medical Patient Education © 2020 [employer]

## 2021-04-21 ENCOUNTER — TELEPHONE (OUTPATIENT)
Dept: FAMILY MEDICINE | Facility: CLINIC | Age: 56
End: 2021-04-21

## 2021-04-21 ENCOUNTER — APPOINTMENT (OUTPATIENT)
Dept: LAB | Age: 56
End: 2021-04-21
Attending: FAMILY MEDICINE
Payer: COMMERCIAL

## 2021-04-21 DIAGNOSIS — R73.03 PREDIABETES: ICD-10-CM

## 2021-04-21 DIAGNOSIS — I10 ESSENTIAL (PRIMARY) HYPERTENSION: ICD-10-CM

## 2021-04-21 DIAGNOSIS — E55.9 VITAMIN D DEFICIENCY, UNSPECIFIED: ICD-10-CM

## 2021-04-21 DIAGNOSIS — E78.00 PURE HYPERCHOLESTEROLEMIA, UNSPECIFIED: ICD-10-CM

## 2021-04-21 PROCEDURE — 30099997 SPECIMEN PROCESSING

## 2021-04-21 NOTE — TELEPHONE ENCOUNTER
Patient called and is requesting recommendation for psych.  Patient has been given information for the following:    Recommendations per Dr. Grullon for Psych:    • [facility] [place]:    P: [phone]  [address], [place] [address]    • [facility]:   P: [phone]  [address] [place], [place]    • [facility]:    - [address] [facility] [place], [place]   P: [phone]     - [address] [facility], [address] [place], [place]   P: [phone]     • [facility]:   P: [phone]  [address] [place] [address]

## 2021-04-23 ENCOUNTER — TELEPHONE (OUTPATIENT)
Dept: FAMILY MEDICINE | Facility: CLINIC | Age: 56
End: 2021-04-23

## 2021-04-23 LAB
25(OH)D3 SERPL-MCNC: 22 NG/ML (ref 30–100)
ALBUMIN SERPL-MCNC: 4.1 G/DL (ref 3.6–5.1)
ALBUMIN/CREAT UR: 4 MCG/MG CREAT
ALBUMIN/GLOB SERPL: 1.6 (CALC) (ref 1–2.5)
ALP SERPL-CCNC: 77 U/L (ref 37–153)
ALT SERPL-CCNC: 8 U/L (ref 6–29)
AST SERPL-CCNC: 10 U/L (ref 10–35)
BASOPHILS # BLD AUTO: 53 CELLS/UL (ref 0–200)
BASOPHILS NFR BLD AUTO: 0.9 %
BILIRUB SERPL-MCNC: 0.4 MG/DL (ref 0.2–1.2)
BUN SERPL-MCNC: 12 MG/DL (ref 7–25)
BUN/CREAT SERPL: ABNORMAL (CALC) (ref 6–22)
CALCIUM SERPL-MCNC: 9.4 MG/DL (ref 8.6–10.4)
CHLORIDE SERPL-SCNC: 108 MMOL/L (ref 98–110)
CHOLEST SERPL-MCNC: 205 MG/DL
CHOLEST/HDLC SERPL: 3.2 (CALC)
CO2 SERPL-SCNC: 24 MMOL/L (ref 20–32)
CREAT SERPL-MCNC: 0.92 MG/DL (ref 0.5–1.05)
CREAT UR-MCNC: 139 MG/DL (ref 20–275)
EOSINOPHIL # BLD AUTO: 130 CELLS/UL (ref 15–500)
EOSINOPHIL NFR BLD AUTO: 2.2 %
ERYTHROCYTE [DISTWIDTH] IN BLOOD BY AUTOMATED COUNT: 13.7 % (ref 11–15)
GLOBULIN SER CALC-MCNC: 2.6 G/DL (CALC) (ref 1.9–3.7)
GLUCOSE SERPL-MCNC: 112 MG/DL (ref 65–99)
HBA1C MFR BLD: 5.8 % OF TOTAL HGB
HCT VFR BLD AUTO: 38.1 % (ref 35–45)
HDLC SERPL-MCNC: 65 MG/DL
HGB BLD-MCNC: 12.6 G/DL (ref 11.7–15.5)
LDLC SERPL CALC-MCNC: 119 MG/DL (CALC)
LYMPHOCYTES # BLD AUTO: 1676 CELLS/UL (ref 850–3900)
LYMPHOCYTES NFR BLD AUTO: 28.4 %
MCH RBC QN AUTO: 28.5 PG (ref 27–33)
MCHC RBC AUTO-ENTMCNC: 33.1 G/DL (ref 32–36)
MCV RBC AUTO: 86.2 FL (ref 80–100)
MICROALBUMIN UR-MCNC: 0.5 MG/DL
MONOCYTES # BLD AUTO: 425 CELLS/UL (ref 200–950)
MONOCYTES NFR BLD AUTO: 7.2 %
NEUTROPHILS # BLD AUTO: 3617 CELLS/UL (ref 1500–7800)
NEUTROPHILS NFR BLD AUTO: 61.3 %
NONHDLC SERPL-MCNC: 140 MG/DL (CALC)
PLATELET # BLD AUTO: 259 THOUSAND/UL (ref 140–400)
PMV BLD REES-ECKER: 11.7 FL (ref 7.5–12.5)
POTASSIUM SERPL-SCNC: 4.2 MMOL/L (ref 3.5–5.3)
PROT SERPL-MCNC: 6.7 G/DL (ref 6.1–8.1)
QUEST EGFR AFRICAN AMERICAN: 82 ML/MIN/1.73M2
QUEST EGFR NON-AFR. AMERICAN: 71 ML/MIN/1.73M2
RBC # BLD AUTO: 4.42 MILLION/UL (ref 3.8–5.1)
SODIUM SERPL-SCNC: 141 MMOL/L (ref 135–146)
TRIGL SERPL-MCNC: 107 MG/DL
TSH SERPL-ACNC: 1.45 MIU/L
WBC # BLD AUTO: 5.9 THOUSAND/UL (ref 3.8–10.8)

## 2021-04-23 NOTE — TELEPHONE ENCOUNTER
Please advise patient that her LDL was elevated 119, optimal goal less than 100.  HDL was good at 65, greater than 60 reduces risk for heart disease.  Triglycerides were good at 107, goal less than 150.  Continue on atorvastatin at this time.  Will consider increasing if levels remain elevated.    Her glucose was slightly elevated in the prediabetic range.  No need for the Metformin.  Continue to watch carbohydrate intake and decrease intake of bread, rice, pasta, starches, juice, soda and sweets.    Your vitamin D levels were low at 22.  Would recommend taking vitamin D 2000 IU daily.    Otherwise her kidney function, electrolytes, liver function, blood count, thyroid function and all other labs were within normal limits.

## 2021-05-05 DIAGNOSIS — I10 ESSENTIAL HYPERTENSION: ICD-10-CM

## 2021-05-05 RX ORDER — LOSARTAN POTASSIUM 50 MG/1
TABLET ORAL
Qty: 30 TABLET | Refills: 2 | Status: SHIPPED | OUTPATIENT
Start: 2021-05-05 | End: 2021-08-04

## 2021-05-07 DIAGNOSIS — K21.9 GASTROESOPHAGEAL REFLUX DISEASE WITHOUT ESOPHAGITIS: ICD-10-CM

## 2021-05-07 RX ORDER — OMEPRAZOLE 20 MG/1
CAPSULE, DELAYED RELEASE ORAL
Qty: 30 CAPSULE | Refills: 2 | Status: SHIPPED | OUTPATIENT
Start: 2021-05-07 | End: 2021-08-04

## 2021-05-11 ENCOUNTER — TELEPHONE (OUTPATIENT)
Dept: FAMILY MEDICINE | Facility: CLINIC | Age: 56
End: 2021-05-11

## 2021-05-11 DIAGNOSIS — E78.00 HYPERCHOLESTEROLEMIA: ICD-10-CM

## 2021-05-11 RX ORDER — ATORVASTATIN CALCIUM 10 MG/1
10 TABLET, FILM COATED ORAL NIGHTLY
Qty: 30 TABLET | Refills: 2 | Status: SHIPPED | OUTPATIENT
Start: 2021-05-11 | End: 2021-11-15 | Stop reason: SDUPTHER

## 2021-07-15 ENCOUNTER — TELEPHONE (OUTPATIENT)
Dept: FAMILY MEDICINE | Facility: CLINIC | Age: 56
End: 2021-07-15

## 2021-07-15 NOTE — TELEPHONE ENCOUNTER
Patient called stating that she is getting surgery at [facility] on 7/30. Patient states that she needs a COVID test prior to the appointment and that [facility] only does COVID testing in the evenings and that she would not have a ride there at that time. Patient called office earlier and was given Central Scheduling but was unable to get scheduled due to not having surgery at a [facility]. Patient wants to know if Dr. Grullon would be able to have her get COVID tested here at the office. Please advise. [phone] is the best number to reach the patient.

## 2021-07-16 NOTE — TELEPHONE ENCOUNTER
Spoke with patient, she is aware we can not give her a time frame for how long it will take for covid testing to come back if she comes to [facility]. Patient would need to have results back within two days.Patient states she do not have a ride to [facility] because they want her to come in the evening but she will let her caregiver know.

## 2021-07-30 ENCOUNTER — TELEPHONE (OUTPATIENT)
Dept: FAMILY MEDICINE | Facility: CLINIC | Age: 56
End: 2021-07-30

## 2021-07-30 NOTE — TELEPHONE ENCOUNTER
Patient called wanting to speak to Dr. Grullon in regards to her bariatric surgery. Patient states that the surgery was cancelled because they found something in her file and she wanted to speak to Dr. Grullon in regards to this. Please advise.

## 2021-07-30 NOTE — TELEPHONE ENCOUNTER
Called patient, phone rings and rings, no answer and no voicemail. Will try again later.     Addendum: per chart review I see a note from surgeon stating:  I called pt and informed her that psych did not clear her for surgery. No note in epic that psychologist contacted pt's therapist. Additionally, since last seen here in clinic, pt was hospitalized in March for possible suicide attempt/homicidal behavior with positive drug and ETOH screen as below.  - Cocaine (Metab) Screen, Urine Negative, 300 ng/mL Cut-Off Positive/Abnormal   - Fentanyl Screen (Urine) Negative Positive/Abnormal  - Alcohol, Medical (Blood) 0 - 10 mg/dL 161High    Patient was also seen in [facility] yesterday for Depression. Per notes patient had no SI/HI but did leave AMA before evaluated.

## 2021-08-02 NOTE — TELEPHONE ENCOUNTER
Called patient again to f/u on this. Still phone rings and rings, no answer and no way to leave a voicemail.

## 2021-08-04 DIAGNOSIS — K21.9 GASTROESOPHAGEAL REFLUX DISEASE WITHOUT ESOPHAGITIS: ICD-10-CM

## 2021-08-04 DIAGNOSIS — I10 ESSENTIAL HYPERTENSION: ICD-10-CM

## 2021-08-04 RX ORDER — OMEPRAZOLE 20 MG/1
CAPSULE, DELAYED RELEASE ORAL
Qty: 30 CAPSULE | Refills: 2 | Status: SHIPPED | OUTPATIENT
Start: 2021-08-04 | End: 2021-11-15 | Stop reason: SDUPTHER

## 2021-08-04 RX ORDER — LOSARTAN POTASSIUM 50 MG/1
TABLET ORAL
Qty: 30 TABLET | Refills: 2 | Status: SHIPPED | OUTPATIENT
Start: 2021-08-04 | End: 2021-11-15 | Stop reason: SDUPTHER

## 2021-09-07 ENCOUNTER — OFFICE VISIT (OUTPATIENT)
Dept: FAMILY MEDICINE | Facility: CLINIC | Age: 56
End: 2021-09-07
Payer: COMMERCIAL

## 2021-09-07 VITALS
TEMPERATURE: 98.2 F | SYSTOLIC BLOOD PRESSURE: 120 MMHG | RESPIRATION RATE: 16 BRPM | WEIGHT: 287 LBS | BODY MASS INDEX: 43.5 KG/M2 | HEIGHT: 68 IN | DIASTOLIC BLOOD PRESSURE: 80 MMHG | OXYGEN SATURATION: 98 % | HEART RATE: 78 BPM

## 2021-09-07 DIAGNOSIS — Z12.31 ENCOUNTER FOR SCREENING MAMMOGRAM FOR MALIGNANT NEOPLASM OF BREAST: ICD-10-CM

## 2021-09-07 DIAGNOSIS — E66.813 CLASS 3 SEVERE OBESITY DUE TO EXCESS CALORIES WITH SERIOUS COMORBIDITY AND BODY MASS INDEX (BMI) OF 40.0 TO 44.9 IN ADULT (CMS/HCC): Primary | ICD-10-CM

## 2021-09-07 DIAGNOSIS — E66.01 CLASS 3 SEVERE OBESITY DUE TO EXCESS CALORIES WITH SERIOUS COMORBIDITY AND BODY MASS INDEX (BMI) OF 40.0 TO 44.9 IN ADULT (CMS/HCC): Primary | ICD-10-CM

## 2021-09-07 DIAGNOSIS — N64.4 BREAST PAIN, LEFT: ICD-10-CM

## 2021-09-07 PROCEDURE — 99213 OFFICE O/P EST LOW 20 MIN: CPT | Performed by: FAMILY MEDICINE

## 2021-09-07 PROCEDURE — 3008F BODY MASS INDEX DOCD: CPT | Performed by: FAMILY MEDICINE

## 2021-09-07 RX ORDER — LOSARTAN POTASSIUM AND HYDROCHLOROTHIAZIDE 25; 100 MG/1; MG/1
1 TABLET ORAL DAILY
COMMUNITY
End: 2022-03-17 | Stop reason: SDUPTHER

## 2021-09-07 ASSESSMENT — PAIN SCALES - GENERAL: PAINLEVEL: 0-NO PAIN

## 2021-09-07 ASSESSMENT — ENCOUNTER SYMPTOMS: FEVER: 0

## 2021-09-07 NOTE — PROGRESS NOTES
Daily Progress Note       Patient ID: Kar Nicholas is a 56 y.o. female.    HPI    56 year old female presents for follow up visit.     Wt Readings from Last 3 Encounters:   09/07/21 130 kg (287 lb)   04/20/21 (!) 138 kg (305 lb)   09/29/20 134 kg (295 lb)     Due to severe morbid obesity and inability to lose weight with multiple attempt - she was considering weight loss surgery.  She was seeing [facility] with plans for bariatric surgery.  She was not approved for surgery due to her mental health.  Continues to work on her diet.  Recently has been losing weight (down 18 pounds since last visit).  Eating about 600-800 calories.     She has noticed left breast pain when laying down.  Due for mammogram.       The following have been reviewed and updated as appropriate in this visit:  Allergies  Meds  Problems       Review of Systems   Constitutional: Negative for fever.             Current Outpatient Medications   Medication Sig Dispense Refill   • atorvastatin (LIPITOR) 10 mg tablet Take 1 tablet (10 mg total) by mouth nightly. 30 tablet 2   • buPROPion XL (WELLBUTRIN XL) 150 mg 24 hr tablet 1 tablet (150 mg total) daily.     • DULoxetine (CYMBALTA) 30 mg capsule 1 capsule (30 mg total) daily.     • DULoxetine (CYMBALTA) 60 mg capsule 1 capsule (60 mg total) daily.     • losartan (COZAAR) 50 mg tablet TAKE 1 TABLET BY MOUTH EVERY MORNING 30 tablet 2   • losartan-hydrochlorothiazide (HYZAAR) 100-25 mg per tablet Take 1 tablet by mouth daily.     • omeprazole (PriLOSEC) 20 mg capsule TAKE 1 CAPSULE BY MOUTH EVERY MORNING BEFORE BREAKFAST 30 capsule 2   • QUEtiapine (SEROquel) 200 mg tablet 1 tablet (200 mg total) nightly.     • QUEtiapine (SEROquel) 25 mg tablet Take 25 mg by mouth 2 (two) times a day.       No current facility-administered medications for this visit.     Past Medical History:   Diagnosis Date   • Alcoholism (CMS/HCC)     In Recovery  Since October 2016    • Anxiety    • Asthma     Remote  "  • Bipolar affect, depressed (CMS/[facility])    • Body mass index (BMI) 45.0-49.9, adult    • Borderline type 2 diabetes mellitus 4/15/2019   • Cocaine addiction (CMS/[facility])      None since November 2016   • Delayed emergence from general anesthesia    • Depression    • GERD (gastroesophageal reflux disease)    • History of snoring    • Hypertension    • Joint pain    • Lipid disorder    • Migraines    • Morbid obesity with BMI of 45.0-49.9, adult (CMS/[facility])    • Osteoarthritis     Knees   • Post-menopause    • Pre-diabetes    • Schizophrenia (CMS/[facility])    • Sciatica    • Sleep apnea     Uses CPAP occas- To bring CPAP Day of Sx   • Urinary incontinence      Family History   Problem Relation Age of Onset   • Colon cancer Biological Mother    • Lung cancer Biological Father    • Heart failure Biological Brother    • Heart block Biological Daughter    • Breast cancer Other    • Breast cancer Cousin      Past Surgical History:   Procedure Laterality Date   • CHOLECYSTECTOMY  2018   • COLONOSCOPY     • HYSTERECTOMY  2010    PARTIAL   • JOINT REPLACEMENT      right knee   • KNEE SURGERY Right    • REDUCTION MAMMAPLASTY Bilateral    • WISDOM TOOTH EXTRACTION       Social History     Tobacco Use   • Smoking status: Current Every Day Smoker     Packs/day: 0.25     Years: 30.00     Pack years: 7.50   • Smokeless tobacco: Never Used   Substance Use Topics   • Alcohol use: No     Comment: Recovering Alcoholic since 10/2016   • Drug use: No     Types: Cocaine     Comment: None since 11/2016     Allergies   Allergen Reactions   • Shellfish Containing Products Angioedema     Other reaction(s): shellfish       Objective   No new labs.    Vitals:    09/07/21 1012   BP: 120/80   BP Location: Left upper arm   Patient Position: Sitting   Pulse: 78   Resp: 16   Temp: 36.8 °C (98.2 °F)   TempSrc: Temporal   SpO2: 98%   Weight: 130 kg (287 lb)   Height: 1.727 m (5' 8\")         Physical Exam  Vitals and nursing note reviewed.   Constitutional:      "  Appearance: Normal appearance. She is well-developed.   HENT:      Head: Normocephalic and atraumatic.   Eyes:      Extraocular Movements: Extraocular movements intact.      Conjunctiva/sclera: Conjunctivae normal.   Pulmonary:      Effort: Pulmonary effort is normal.   Musculoskeletal:         General: Normal range of motion.      Cervical back: Normal range of motion.   Neurological:      General: No focal deficit present.      Mental Status: She is alert and oriented to person, place, and time.   Psychiatric:         Mood and Affect: Mood normal.         Behavior: Behavior normal.         Thought Content: Thought content normal.         Judgment: Judgment normal.         Assessment/Plan   Problem List Items Addressed This Visit        Endocrine/Metabolic    Class 3 severe obesity due to excess calories with serious comorbidity and body mass index (BMI) of 40.0 to 44.9 in adult (CMS/[facility]) - Primary    Current Assessment & Plan     Discussed portion control and calorie counting.  Recommended against severe calorie restriction.  She will try [employer] to track food and calorie intake.  She was denied bariatric surgery due to mental health.  Will set her up with [facility].         Relevant Orders    Ambulatory referral to [facility]      Other Visit Diagnoses     Encounter for screening mammogram for malignant neoplasm of breast        Relevant Orders    BI DIAGNOSTIC MAMMOGRAM BILATERAL (TOMOSYNTHESIS)    Breast pain, left        Relevant Orders    BI DIAGNOSTIC MAMMOGRAM BILATERAL (TOMOSYNTHESIS)    ULTRASOUND BREAST LIMITED LEFT          Prosper Grullon MD  9/7/2021

## 2021-09-07 NOTE — ASSESSMENT & PLAN NOTE
Discussed portion control and calorie counting.  Recommended against severe calorie restriction.  She will try [employer] to track food and calorie intake.  She was denied bariatric surgery due to mental health.  Will set her up with [facility].

## 2021-09-07 NOTE — PATIENT INSTRUCTIONS
Patient Education     Obesity, Adult  Obesity is the condition of having too much total body fat. Being overweight or obese means that your weight is greater than what is considered healthy for your body size. Obesity is determined by a measurement called BMI. BMI is an estimate of body fat and is calculated from height and weight. For adults, a BMI of 30 or higher is considered obese.  Obesity can lead to other health concerns and major illnesses, including:  · Stroke.  · Coronary artery disease (CAD).  · Type 2 diabetes.  · Some types of cancer, including cancers of the colon, breast, uterus, and gallbladder.  · Osteoarthritis.  · High blood pressure (hypertension).  · High cholesterol.  · Sleep apnea.  · Gallbladder stones.  · Infertility problems.  What are the causes?  Common causes of this condition include:  · Eating daily meals that are high in calories, sugar, and fat.  · Being born with genes that may make you more likely to become obese.  · Having a medical condition that causes obesity, including:  ? Hypothyroidism.  ? Polycystic ovarian syndrome (PCOS).  ? Binge-eating disorder.  ? Cushing syndrome.  · Taking certain medicines, such as steroids, antidepressants, and seizure medicines.  · Not being physically active (sedentary lifestyle).  · Not getting enough sleep.  · Drinking high amounts of sugar-sweetened beverages, such as soft drinks.  What increases the risk?  The following factors may make you more likely to develop this condition:  · Having a family history of obesity.  · Being a woman of  descent.  · Being a man of  descent.  · Living in an area with limited access to:  ? White, recreation centers, or sidewalks.  ? Healthy food choices, such as grocery stores and farmers' markets.  What are the signs or symptoms?  The main sign of this condition is having too much body fat.  How is this diagnosed?  This condition is diagnosed based on:  · Your BMI. If you are an adult  with a BMI of 30 or higher, you are considered obese.  · Your waist circumference. This measures the distance around your waistline.  · Your skinfold thickness. Your health care provider may gently pinch a fold of your skin and measure it.  You may have other tests to check for underlying conditions.  How is this treated?  Treatment for this condition often includes changing your lifestyle. Treatment may include some or all of the following:  · Dietary changes. This may include developing a healthy meal plan.  · Regular physical activity. This may include activity that causes your heart to beat faster (aerobic exercise) and strength training. Work with your health care provider to design an exercise program that works for you.  · Medicine to help you lose weight if you are unable to lose 1 pound a week after 6 weeks of healthy eating and more physical activity.  · Treating conditions that cause the obesity (underlying conditions).  · Surgery. Surgical options may include gastric banding and gastric bypass. Surgery may be done if:  ? Other treatments have not helped to improve your condition.  ? You have a BMI of 40 or higher.  ? You have life-threatening health problems related to obesity.  Follow these instructions at home:  Eating and drinking    · Follow recommendations from your health care provider about what you eat and drink. Your health care provider may advise you to:  ? Limit fast food, sweets, and processed snack foods.  ? Choose low-fat options, such as low-fat milk instead of whole milk.  ? Eat 5 or more servings of fruits or vegetables every day.  ? Eat at home more often. This gives you more control over what you eat.  ? Choose healthy foods when you eat out.  ? Learn to read food labels. This will help you understand how much food is considered 1 serving.  ? Learn what a healthy serving size is.  ? Keep low-fat snacks available.  ? Limit sugary drinks, such as soda, fruit juice, sweetened iced tea,  and flavored milk.  · Drink enough water to keep your urine pale yellow.  · Do not follow a fad diet. Fad diets can be unhealthy and even dangerous.  Physical activity  · Exercise regularly, as told by your health care provider.  ? Most adults should get up to 150 minutes of moderate-intensity exercise every week.  ? Ask your health care provider what types of exercise are safe for you and how often you should exercise.  · Warm up and stretch before being active.  · Cool down and stretch after being active.  · Rest between periods of activity.  Lifestyle  · Work with your health care provider and a dietitian to set a weight-loss goal that is healthy and reasonable for you.  · Limit your screen time.  · Find ways to reward yourself that do not involve food.  · Do not drink alcohol if:  ? Your health care provider tells you not to drink.  ? You are pregnant, may be pregnant, or are planning to become pregnant.  · If you drink alcohol:  ? Limit how much you use to:  § 0-1 drink a day for women.  § 0-2 drinks a day for men.  ? Be aware of how much alcohol is in your drink. In the [place] one drink equals one 12 oz bottle of beer (355 mL), one 5 oz glass of wine (148 mL), or one 1½ oz glass of hard liquor (44 mL).  General instructions  · Keep a weight-loss journal to keep track of the food you eat and how much exercise you get.  · Take over-the-counter and prescription medicines only as told by your health care provider.  · Take vitamins and supplements only as told by your health care provider.  · Consider joining a support group. Your health care provider may be able to recommend a support group.  · Keep all follow-up visits as told by your health care provider. This is important.  Contact a health care provider if:  · You are unable to meet your weight loss goal after 6 weeks of dietary and lifestyle changes.  Get help right away if you are having:  · Trouble breathing.  · Suicidal thoughts or  behaviors.  Summary  · Obesity is the condition of having too much total body fat.  · Being overweight or obese means that your weight is greater than what is considered healthy for your body size.  · Work with your health care provider and a dietitian to set a weight-loss goal that is healthy and reasonable for you.  · Exercise regularly, as told by your health care provider. Ask your health care provider what types of exercise are safe for you and how often you should exercise.  This information is not intended to replace advice given to you by your health care provider. Make sure you discuss any questions you have with your health care provider.  Document Revised: 08/22/2019 Document Reviewed: 08/22/2019  [employer] Patient Education © 2021 [employer]

## 2021-09-13 ENCOUNTER — TELEMEDICINE (OUTPATIENT)
Dept: FAMILY MEDICINE | Facility: CLINIC | Age: 56
End: 2021-09-13
Payer: COMMERCIAL

## 2021-09-13 DIAGNOSIS — H66.91 RIGHT OTITIS MEDIA, UNSPECIFIED OTITIS MEDIA TYPE: Primary | ICD-10-CM

## 2021-09-13 PROCEDURE — 99213 OFFICE O/P EST LOW 20 MIN: CPT | Mod: 95 | Performed by: NURSE PRACTITIONER

## 2021-09-13 RX ORDER — AMOXICILLIN AND CLAVULANATE POTASSIUM 875; 125 MG/1; MG/1
1 TABLET, FILM COATED ORAL 2 TIMES DAILY
Qty: 14 TABLET | Refills: 0 | Status: SHIPPED | OUTPATIENT
Start: 2021-09-13 | End: 2021-09-20

## 2021-09-13 ASSESSMENT — ENCOUNTER SYMPTOMS
NAUSEA: 0
ADENOPATHY: 0
SINUS PAIN: 0
MUSCULOSKELETAL NEGATIVE: 1
ENDOCRINE NEGATIVE: 1
DIZZINESS: 0
ALLERGIC/IMMUNOLOGIC NEGATIVE: 1
APPETITE CHANGE: 0
BRUISES/BLEEDS EASILY: 0
FEVER: 0
PALPITATIONS: 0
SORE THROAT: 1
NECK PAIN: 0
HEMATOLOGIC/LYMPHATIC NEGATIVE: 1
RHINORRHEA: 0
CONSTIPATION: 0
FACIAL ASYMMETRY: 0
SINUS PRESSURE: 0
EYES NEGATIVE: 1
SHORTNESS OF BREATH: 0
HEADACHES: 0
COUGH: 0
FATIGUE: 0
LIGHT-HEADEDNESS: 0
VOMITING: 0
PSYCHIATRIC NEGATIVE: 1
CHILLS: 0
DIARRHEA: 0

## 2021-09-13 NOTE — PROGRESS NOTES
[facility]  [address] [place] [address]  [phone]       Verification of Patient Location:  The patient affirms they are currently located in the following state: [place]    Request for Consent:   Audio and Video Encounter   Hello, my name is RadhaKAREN Goyal.  Before we proceed, can you please verify your identification by telling me your full name and date of birth?  Can you tell me who is in the room with you?    You and I are about to have a telemedicine check-in or visit because you have requested it.  This is a live video-conference.  I am a real person, speaking to you in real time.  There is no one else with me on the video-conference.  However, when we use (Guestmob, SalesVu, etc) it is important for you to know that the video-conference may not be secure or private.  I am not recording this conversation and I am asking you not to record it.  This telemedicine visit will be billed to your health insurance or you, if you are self-insured.  You understand you will be responsible for any copayments or coinsurances that apply to your telemedicine visit.  Communication platform used for this encounter:  Doximity     Before starting our telemedicine visit, I am required to get your consent for this virtual check-in or visit by telemedicine. Do you consent?            Patient Response to Request for Consent:  Yes        Reason for visit:   Chief Complaint   Patient presents with   • Sinus Problem      HPI   Kar Nicholas is a 56 y.o. female who presents with sinus problem     Known COVID exposure: none       Recent travel: none     Vaccination status: none    Earache / Otalgia   There is pain in the right ear. This is a new (felt like irriated ear saturday ) problem. The problem occurs constantly. There has been no fever. Associated symptoms include a sore throat (yesterday. denies redness or exudate noted). Pertinent negatives include no coughing, diarrhea,  ear discharge, headaches, hearing loss, neck pain, rash, rhinorrhea or vomiting. Associated symptoms comments: Hurts undernearth jaw on right side.           Medical History:  Past Medical History:   Diagnosis Date   • Alcoholism (CMS/[facility])     In Recovery  Since October 2016    • Anxiety    • Asthma     Remote   • Bipolar affect, depressed (CMS/[facility])    • Body mass index (BMI) 45.0-49.9, adult    • Borderline type 2 diabetes mellitus 4/15/2019   • Cocaine addiction (CMS/[facility])      None since November 2016   • Delayed emergence from general anesthesia    • Depression    • GERD (gastroesophageal reflux disease)    • History of snoring    • Hypertension    • Joint pain    • Lipid disorder    • Migraines    • Morbid obesity with BMI of 45.0-49.9, adult (CMS/[facility])    • Osteoarthritis     Knees   • Post-menopause    • Pre-diabetes    • Schizophrenia (CMS/[facility])    • Sciatica    • Sleep apnea     Uses CPAP occas- To bring CPAP Day of Sx   • Urinary incontinence        Surgical History:  Past Surgical History:   Procedure Laterality Date   • CHOLECYSTECTOMY  2018   • COLONOSCOPY     • HYSTERECTOMY  2010    PARTIAL   • JOINT REPLACEMENT      right knee   • KNEE SURGERY Right    • REDUCTION MAMMAPLASTY Bilateral    • WISDOM TOOTH EXTRACTION         Social History:  Social History     Social History Narrative   • Not on file       Family History:  Family History   Problem Relation Age of Onset   • Colon cancer Biological Mother    • Lung cancer Biological Father    • Heart failure Biological Brother    • Heart block Biological Daughter    • Breast cancer Other    • Breast cancer Cousin        Allergies:  Shellfish containing products    Current Medications:  Current Outpatient Medications   Medication Sig Dispense Refill   • amoxicillin-pot clavulanate (AUGMENTIN) 875-125 mg per tablet Take 1 tablet by mouth 2 (two) times a day for 7 days. 14 tablet 0   • atorvastatin (LIPITOR) 10 mg tablet Take 1 tablet (10 mg total) by mouth  nightly. 30 tablet 2   • buPROPion XL (WELLBUTRIN XL) 150 mg 24 hr tablet 1 tablet (150 mg total) daily.     • DULoxetine (CYMBALTA) 30 mg capsule 1 capsule (30 mg total) daily.     • DULoxetine (CYMBALTA) 60 mg capsule 1 capsule (60 mg total) daily.     • losartan (COZAAR) 50 mg tablet TAKE 1 TABLET BY MOUTH EVERY MORNING 30 tablet 2   • losartan-hydrochlorothiazide (HYZAAR) 100-25 mg per tablet Take 1 tablet by mouth daily.     • omeprazole (PriLOSEC) 20 mg capsule TAKE 1 CAPSULE BY MOUTH EVERY MORNING BEFORE BREAKFAST 30 capsule 2   • QUEtiapine (SEROquel) 200 mg tablet 1 tablet (200 mg total) nightly.     • QUEtiapine (SEROquel) 25 mg tablet Take 25 mg by mouth 2 (two) times a day.       No current facility-administered medications for this visit.       Review of Systems:  Review of Systems   Constitutional: Negative for appetite change, chills, fatigue and fever.   HENT: Positive for ear pain and sore throat (yesterday. denies redness or exudate noted). Negative for ear discharge, hearing loss, postnasal drip, rhinorrhea, sinus pressure, sinus pain and sneezing.    Eyes: Negative.    Respiratory: Negative for cough and shortness of breath.    Cardiovascular: Negative for chest pain and palpitations.   Gastrointestinal: Negative for constipation, diarrhea, nausea and vomiting.   Endocrine: Negative.    Genitourinary: Negative.    Musculoskeletal: Negative.  Negative for neck pain.   Skin: Negative.  Negative for rash.   Allergic/Immunologic: Negative.    Neurological: Negative for dizziness, facial asymmetry, light-headedness and headaches.   Hematological: Negative.  Negative for adenopathy. Does not bruise/bleed easily.   Psychiatric/Behavioral: Negative.        Objective     Vital Signs:  There were no vitals filed for this visit.    BMI:  There is no height or weight on file to calculate BMI.     Physical Exam    Recent labs before today:     Lab Results   Component Value Date    WBC 5.9 04/21/2021    HGB  12.6 04/21/2021    HCT 38.1 04/21/2021     04/21/2021    CHOL 205 (H) 04/21/2021    TRIG 107 04/21/2021    HDL 65 04/21/2021    ALT 8 04/21/2021    AST 10 04/21/2021     04/21/2021    K 4.2 04/21/2021     04/21/2021    CREATININE 0.92 04/21/2021    BUN 12 04/21/2021    CO2 24 04/21/2021    TSH 1.45 04/21/2021    INR 0.9 08/03/2018    HGBA1C 5.8 (H) 04/21/2021    MICROALBUR 0.5 04/21/2021        Assessment/Plan   Problem List Items Addressed This Visit     None      Visit Diagnoses     Right otitis media, unspecified otitis media type    -  Primary    Relevant Medications    amoxicillin-pot clavulanate (AUGMENTIN) 875-125 mg per tablet      advise patient of worsening symptoms needing covid testing at this time I don't feel she need covid testing     Advise patient if she see no improvement in ear by end of the week and doesn't have any covid symptoms then we will see her in office to look in her ear     Procedures     Due to the national medical emergency, this visit was conducted via telephone/ video.     Because of the COVID-19 pandemic, in order to limit patient contact and promote the safety of patients and the healthcare team, I did not physically examine the patient.   There are no Patient Instructions on file for this visit.           Time Spent in Medical Discussion During This Encounter:  20 minutes    KAREN Tejeda  9/13/2021

## 2021-09-20 ENCOUNTER — TELEPHONE (OUTPATIENT)
Dept: FAMILY MEDICINE | Facility: CLINIC | Age: 56
End: 2021-09-20

## 2021-09-20 ENCOUNTER — DOCUMENTATION (OUTPATIENT)
Dept: FAMILY MEDICINE | Facility: CLINIC | Age: 56
End: 2021-09-20

## 2021-09-20 NOTE — PROGRESS NOTES
Called patient 3 times bfor telemed visit at [phone] went to voicemail.  One at 4;17 then around at 4;20 then another at 4;25  Advise for patient on voicemail to schedule another appointment

## 2021-09-20 NOTE — TELEPHONE ENCOUNTER
On call message:  Radha placed 3 calls to reach this pt for today's telemd: she later called saying no one called and also says that her primary phone #[phone])   is no longer a valid number and that the [phone] is the only valid #.    She hasd diarrhea flare since Friday, doubts food poisoning.   Note two of her current meds  Have diarrhea potential ( Cymbalta, Losartan), pt has observed that her granola cereal  Triggers loose BM raising question of gluten sensitivity.  Plans: can use Immodium, work on oral rehydration,  call to rescjhedule tel;emrehana hunterosit with Radha, consider gluten elimination diet x 15 days along with celiac testing.  She has GI consult in October as prelim for colonoscopy screening: they insisted jann see her due to her weight before scheduling the test./

## 2021-09-21 NOTE — TELEPHONE ENCOUNTER
I called her at the number that is starred as her primary which I guess was the invalid number. Kandy added you to Sent to fix the numbers and to call to reset up a visit. Due to it not being possible covid we can see in office. thanks

## 2021-09-24 ENCOUNTER — HOSPITAL ENCOUNTER (OUTPATIENT)
Dept: RADIOLOGY | Age: 56
Discharge: HOME | End: 2021-09-24
Attending: FAMILY MEDICINE
Payer: COMMERCIAL

## 2021-09-24 DIAGNOSIS — N64.4 BREAST PAIN, LEFT: ICD-10-CM

## 2021-09-24 DIAGNOSIS — Z12.31 ENCOUNTER FOR SCREENING MAMMOGRAM FOR MALIGNANT NEOPLASM OF BREAST: ICD-10-CM

## 2021-09-24 PROCEDURE — 76642 ULTRASOUND BREAST LIMITED: CPT | Mod: LT

## 2021-09-24 PROCEDURE — 77066 DX MAMMO INCL CAD BI: CPT

## 2021-09-27 ENCOUNTER — TELEPHONE (OUTPATIENT)
Dept: FAMILY MEDICINE | Facility: CLINIC | Age: 56
End: 2021-09-27

## 2021-09-28 NOTE — TELEPHONE ENCOUNTER
Please advise Kar that her mammogram and ultrasound was normal.  If she continues to have pain, we can refer her to a breast specialist.

## 2021-11-15 DIAGNOSIS — E78.00 HYPERCHOLESTEROLEMIA: ICD-10-CM

## 2021-11-15 DIAGNOSIS — I10 ESSENTIAL HYPERTENSION: ICD-10-CM

## 2021-11-15 DIAGNOSIS — K21.9 GASTROESOPHAGEAL REFLUX DISEASE WITHOUT ESOPHAGITIS: ICD-10-CM

## 2021-11-15 RX ORDER — OMEPRAZOLE 20 MG/1
CAPSULE, DELAYED RELEASE ORAL
Qty: 90 CAPSULE | Refills: 0 | Status: SHIPPED | OUTPATIENT
Start: 2021-11-15 | End: 2021-11-17 | Stop reason: SDUPTHER

## 2021-11-15 RX ORDER — ATORVASTATIN CALCIUM 10 MG/1
10 TABLET, FILM COATED ORAL NIGHTLY
Qty: 90 TABLET | Refills: 0 | Status: SHIPPED | OUTPATIENT
Start: 2021-11-15 | End: 2022-01-31

## 2021-11-15 NOTE — TELEPHONE ENCOUNTER
Pt is requesting refills of the following:    omeprazole (PriLOSEC) 20 mg capsule  losartan (COZAAR) 50 mg tablet  losartan-hydrochlorothiazide (HYZAAR) 100-25 mg per tablet  atorvastatin (LIPITOR) 10 mg tablet      Please send to [facility] in [place] on [address]

## 2021-11-16 RX ORDER — LOSARTAN POTASSIUM 50 MG/1
50 TABLET ORAL EVERY MORNING
Qty: 90 TABLET | Refills: 0 | Status: SHIPPED | OUTPATIENT
Start: 2021-11-16 | End: 2022-06-03

## 2021-11-16 RX ORDER — LOSARTAN POTASSIUM AND HYDROCHLOROTHIAZIDE 25; 100 MG/1; MG/1
1 TABLET ORAL DAILY
Qty: 90 TABLET | Refills: 0 | OUTPATIENT
Start: 2021-11-16

## 2021-11-17 ENCOUNTER — TELEPHONE (OUTPATIENT)
Dept: FAMILY MEDICINE | Facility: CLINIC | Age: 56
End: 2021-11-17

## 2021-11-17 DIAGNOSIS — K21.9 GASTROESOPHAGEAL REFLUX DISEASE WITHOUT ESOPHAGITIS: ICD-10-CM

## 2021-11-17 RX ORDER — OMEPRAZOLE 20 MG/1
CAPSULE, DELAYED RELEASE ORAL
Qty: 90 CAPSULE | Refills: 0 | Status: SHIPPED | OUTPATIENT
Start: 2021-11-17 | End: 2022-04-12

## 2021-11-17 NOTE — TELEPHONE ENCOUNTER
Pt is requesting a refill of:    omeprazole 20 mg capsule (with refills)    [facility] (pt did not have a number for them)    [facility] said she needs a higher level of care. Pt also doesn't have any more pills left.

## 2021-11-19 ENCOUNTER — TELEPHONE (OUTPATIENT)
Dept: FAMILY MEDICINE | Facility: CLINIC | Age: 56
End: 2021-11-19

## 2021-11-22 NOTE — TELEPHONE ENCOUNTER
Pt is calling because she wants a recommendation for where to go to get toenails surgically removed.    She stated she's been dealing with nail fungus since she was a kid. She said they're now very discolored, have an odor and she cannot cut them because they're so thick.      
Spoke with patient, given Dr. Dewey (podiatry) phone number to call and schedule appointment.  
[phone]

## 2021-12-07 ENCOUNTER — TELEPHONE (OUTPATIENT)
Dept: FAMILY MEDICINE | Facility: CLINIC | Age: 56
End: 2021-12-07
Payer: COMMERCIAL

## 2021-12-07 NOTE — TELEPHONE ENCOUNTER
Patient called stating that he C-pap machine was recalled and she wants to have her chest examined. Patient states that the recall is stating that she could get cancer from the C-pap machine that was recalled. Advised patient that she would need to see her pulmonologist and she states that she doesn't have one. Patient would like a recommendation for a pulmonologist. Please advise

## 2022-01-31 DIAGNOSIS — E78.00 HYPERCHOLESTEROLEMIA: ICD-10-CM

## 2022-01-31 RX ORDER — ATORVASTATIN CALCIUM 10 MG/1
TABLET, FILM COATED ORAL
Qty: 90 TABLET | Refills: 1 | Status: SHIPPED | OUTPATIENT
Start: 2022-01-31 | End: 2022-03-07 | Stop reason: SDUPTHER

## 2022-03-07 ENCOUNTER — TELEPHONE (OUTPATIENT)
Dept: FAMILY MEDICINE | Facility: CLINIC | Age: 57
End: 2022-03-07
Payer: COMMERCIAL

## 2022-03-07 DIAGNOSIS — E78.00 HYPERCHOLESTEROLEMIA: ICD-10-CM

## 2022-03-07 RX ORDER — ATORVASTATIN CALCIUM 10 MG/1
10 TABLET, FILM COATED ORAL NIGHTLY
Qty: 90 TABLET | Refills: 1 | Status: SHIPPED | OUTPATIENT
Start: 2022-03-07 | End: 2022-06-03

## 2022-03-17 ENCOUNTER — TELEPHONE (OUTPATIENT)
Dept: FAMILY MEDICINE | Facility: CLINIC | Age: 57
End: 2022-03-17
Payer: COMMERCIAL

## 2022-03-17 RX ORDER — LOSARTAN POTASSIUM AND HYDROCHLOROTHIAZIDE 25; 100 MG/1; MG/1
1 TABLET ORAL DAILY
Qty: 30 TABLET | Refills: 1 | Status: SHIPPED | OUTPATIENT
Start: 2022-03-17 | End: 2022-06-03

## 2022-04-12 DIAGNOSIS — K21.9 GASTROESOPHAGEAL REFLUX DISEASE WITHOUT ESOPHAGITIS: ICD-10-CM

## 2022-04-12 RX ORDER — OMEPRAZOLE 20 MG/1
CAPSULE, DELAYED RELEASE ORAL
Qty: 90 CAPSULE | Refills: 0 | Status: SHIPPED | OUTPATIENT
Start: 2022-04-12 | End: 2022-06-03

## 2022-04-29 ENCOUNTER — TELEPHONE (OUTPATIENT)
Dept: FAMILY MEDICINE | Facility: CLINIC | Age: 57
End: 2022-04-29
Payer: COMMERCIAL

## 2022-05-03 ENCOUNTER — TELEPHONE (OUTPATIENT)
Dept: FAMILY MEDICINE | Facility: CLINIC | Age: 57
End: 2022-05-03
Payer: COMMERCIAL

## 2022-05-03 NOTE — TELEPHONE ENCOUNTER
[facility] calling regarding this pt. They are mental health & manage pt's medication.    When pt was discharged, she was given 2 different losartans - potassium AND HCTZ.    Requesting clarification on which losartan is correct & fax a med list to [phone]    Okay to leave detailed message for Maia when returning call (as she will be driving all day)

## 2022-06-03 ENCOUNTER — OFFICE VISIT (OUTPATIENT)
Dept: FAMILY MEDICINE | Facility: CLINIC | Age: 57
End: 2022-06-03
Payer: COMMERCIAL

## 2022-06-03 VITALS
HEART RATE: 74 BPM | TEMPERATURE: 97.9 F | HEIGHT: 68 IN | DIASTOLIC BLOOD PRESSURE: 76 MMHG | SYSTOLIC BLOOD PRESSURE: 112 MMHG | WEIGHT: 281.4 LBS | OXYGEN SATURATION: 96 % | BODY MASS INDEX: 42.65 KG/M2 | RESPIRATION RATE: 16 BRPM

## 2022-06-03 DIAGNOSIS — F20.9 SCHIZOPHRENIA, UNSPECIFIED TYPE: ICD-10-CM

## 2022-06-03 DIAGNOSIS — E78.00 HYPERCHOLESTEROLEMIA: ICD-10-CM

## 2022-06-03 DIAGNOSIS — F41.9 ANXIETY: ICD-10-CM

## 2022-06-03 DIAGNOSIS — F19.11 HISTORY OF SUBSTANCE ABUSE (CMS/HCC): ICD-10-CM

## 2022-06-03 DIAGNOSIS — I10 ESSENTIAL HYPERTENSION: Primary | ICD-10-CM

## 2022-06-03 DIAGNOSIS — E66.813 CLASS 3 SEVERE OBESITY DUE TO EXCESS CALORIES WITH SERIOUS COMORBIDITY AND BODY MASS INDEX (BMI) OF 40.0 TO 44.9 IN ADULT (CMS/HCC): ICD-10-CM

## 2022-06-03 DIAGNOSIS — F31.10 BIPOLAR AFFECTIVE DISORDER, CURRENT EPISODE MANIC, CURRENT EPISODE SEVERITY UNSPECIFIED (CMS/HCC): ICD-10-CM

## 2022-06-03 DIAGNOSIS — E66.01 CLASS 3 SEVERE OBESITY DUE TO EXCESS CALORIES WITH SERIOUS COMORBIDITY AND BODY MASS INDEX (BMI) OF 40.0 TO 44.9 IN ADULT (CMS/HCC): ICD-10-CM

## 2022-06-03 DIAGNOSIS — F43.10 PTSD (POST-TRAUMATIC STRESS DISORDER): ICD-10-CM

## 2022-06-03 DIAGNOSIS — E55.9 VITAMIN D DEFICIENCY: ICD-10-CM

## 2022-06-03 DIAGNOSIS — K21.9 GASTROESOPHAGEAL REFLUX DISEASE WITHOUT ESOPHAGITIS: ICD-10-CM

## 2022-06-03 DIAGNOSIS — F33.9 EPISODE OF RECURRENT MAJOR DEPRESSIVE DISORDER, UNSPECIFIED DEPRESSION EPISODE SEVERITY (CMS/HCC): ICD-10-CM

## 2022-06-03 DIAGNOSIS — R73.03 PREDIABETES: ICD-10-CM

## 2022-06-03 PROCEDURE — 99214 OFFICE O/P EST MOD 30 MIN: CPT | Performed by: FAMILY MEDICINE

## 2022-06-03 PROCEDURE — 3008F BODY MASS INDEX DOCD: CPT | Performed by: FAMILY MEDICINE

## 2022-06-03 RX ORDER — PRAZOSIN HYDROCHLORIDE 2 MG/1
2 CAPSULE ORAL NIGHTLY
COMMUNITY
End: 2023-10-17 | Stop reason: SDUPTHER

## 2022-06-03 RX ORDER — BUPROPION HYDROCHLORIDE 150 MG/1
150 TABLET ORAL DAILY
COMMUNITY
Start: 2022-06-03 | End: 2023-10-17 | Stop reason: SDUPTHER

## 2022-06-03 RX ORDER — QUETIAPINE FUMARATE 300 MG/1
TABLET, FILM COATED ORAL
COMMUNITY
Start: 2022-05-25 | End: 2022-06-03

## 2022-06-03 RX ORDER — ATORVASTATIN CALCIUM 10 MG/1
10 TABLET, FILM COATED ORAL NIGHTLY
Qty: 90 TABLET | Refills: 1 | COMMUNITY
Start: 2022-06-03 | End: 2022-09-06

## 2022-06-03 RX ORDER — DULOXETIN HYDROCHLORIDE 30 MG/1
30 CAPSULE, DELAYED RELEASE ORAL DAILY
COMMUNITY
Start: 2022-06-03 | End: 2023-10-17 | Stop reason: SDUPTHER

## 2022-06-03 RX ORDER — QUETIAPINE FUMARATE 400 MG/1
400 TABLET, FILM COATED ORAL NIGHTLY
COMMUNITY
Start: 2022-06-03 | End: 2023-10-17 | Stop reason: SDUPTHER

## 2022-06-03 RX ORDER — NALTREXONE HYDROCHLORIDE 50 MG/1
50 TABLET, FILM COATED ORAL DAILY
COMMUNITY
Start: 2022-06-03 | End: 2024-08-13 | Stop reason: SDUPTHER

## 2022-06-03 RX ORDER — LOSARTAN POTASSIUM AND HYDROCHLOROTHIAZIDE 25; 100 MG/1; MG/1
1 TABLET ORAL DAILY
Qty: 30 TABLET | Refills: 1 | COMMUNITY
Start: 2022-06-03 | End: 2022-06-08

## 2022-06-03 RX ORDER — OXYCODONE HYDROCHLORIDE 5 MG/1
5 TABLET ORAL
COMMUNITY
Start: 2022-05-23 | End: 2022-06-06

## 2022-06-03 RX ORDER — NALTREXONE HYDROCHLORIDE 50 MG/1
TABLET, FILM COATED ORAL
COMMUNITY
Start: 2022-05-25 | End: 2022-06-03

## 2022-06-03 RX ORDER — OMEPRAZOLE 20 MG/1
20 CAPSULE, DELAYED RELEASE ORAL
Qty: 90 CAPSULE | Refills: 0
Start: 2022-06-03 | End: 2022-06-28 | Stop reason: SDUPTHER

## 2022-06-03 RX ORDER — DULOXETIN HYDROCHLORIDE 60 MG/1
60 CAPSULE, DELAYED RELEASE ORAL DAILY
COMMUNITY
Start: 2022-06-03 | End: 2023-10-17 | Stop reason: SDUPTHER

## 2022-06-03 ASSESSMENT — ENCOUNTER SYMPTOMS: SHORTNESS OF BREATH: 0

## 2022-06-03 NOTE — PROGRESS NOTES
Daily Progress Note       Patient ID: Kar Nicholas is a 57 y.o. female.    HPI    57 year old female presents for follow up visit.      Hx of Schizophrenia, Bipolar - Manic, PTSD and Anxiety - has been following up with Psychiatry - now with [facility] in [place].  Recent adjustments that she feels she is doing better on.  Currently on Wellbutrin XL, Cymbalta, Seroquel and Prazosin.      Essential Hypertension - currently on losartan-HCTZ 100-25 mg daily.      Hypercholesterolemia - currently on atorvastatin 10 mg qhs - tolerating with no complaints.      GERD - currently on omeprazole 20 mg daily.  Attempted to switch to H2 blocker but had worsening of symptoms.      Vitamin D Deficiency - was on Ergocalciferol 50,000 IU once weekly x 12 weeks, then on Vitamin D 2000 IU once daily; currently not on supplementation.     History of substance abuse     S/p ORIF of left distal radius fracture on 4/25/2022.      The following have been reviewed and updated as appropriate in this visit:   Allergies  Meds  Problems         Review of Systems   Respiratory: Negative for shortness of breath.    Cardiovascular: Negative for chest pain.             Current Outpatient Medications   Medication Sig Dispense Refill   • atorvastatin (LIPITOR) 10 mg tablet Take 1 tablet (10 mg total) by mouth nightly. 90 tablet 1   • buPROPion XL (WELLBUTRIN XL) 150 mg 24 hr tablet 1 tablet (150 mg total) daily.     • DULoxetine (CYMBALTA) 30 mg capsule 1 capsule (30 mg total) daily.     • DULoxetine (CYMBALTA) 60 mg capsule 1 capsule (60 mg total) daily.     • losartan-hydrochlorothiazide (HYZAAR) 100-25 mg per tablet Take 1 tablet by mouth daily. 30 tablet 1   • nalTREXone (DEPADE) 50 mg tablet 1 tablet (50 mg total) daily.     • omeprazole (PriLOSEC) 20 mg capsule Take 1 capsule (20 mg total) by mouth daily before breakfast. 90 capsule 0   • oxyCODONE (ROXICODONE) 5 mg immediate release tablet Take 5 mg by mouth.     • prazosin  (MINIPRESS) 2 mg capsule Take 2 mg by mouth nightly.     • QUEtiapine (SEROquel) 300 mg tablet 1 tablet (300 mg total) nightly.       No current facility-administered medications for this visit.     Past Medical History:   Diagnosis Date   • Alcoholism (CMS/[facility])     In Recovery  Since October 2016    • Anxiety    • Asthma     Remote   • Bipolar affect, depressed (CMS/[facility])    • Body mass index (BMI) 45.0-49.9, adult    • Borderline type 2 diabetes mellitus 4/15/2019   • Cocaine addiction (CMS/[facility])      None since November 2016   • Delayed emergence from general anesthesia    • Depression    • GERD (gastroesophageal reflux disease)    • History of snoring    • Hypertension    • Joint pain    • Lipid disorder    • Migraines    • Morbid obesity with BMI of 45.0-49.9, adult (CMS/[facility])    • Osteoarthritis     Knees   • Post-menopause    • Pre-diabetes    • Schizophrenia (CMS/[facility])    • Sciatica    • Sleep apnea     Uses CPAP occas- To bring CPAP Day of Sx   • Urinary incontinence      Family History   Problem Relation Age of Onset   • Colon cancer Biological Mother    • Lung cancer Biological Father    • Heart failure Biological Brother    • Heart block Biological Daughter    • Breast cancer Other    • Breast cancer Cousin      Past Surgical History:   Procedure Laterality Date   • CHOLECYSTECTOMY  2018   • COLONOSCOPY     • HYSTERECTOMY  2010    PARTIAL   • JOINT REPLACEMENT      right knee   • KNEE SURGERY Right    • REDUCTION MAMMAPLASTY Bilateral    • WISDOM TOOTH EXTRACTION       Social History     Tobacco Use   • Smoking status: Current Every Day Smoker     Packs/day: 0.25     Years: 30.00     Pack years: 7.50   • Smokeless tobacco: Never Used   Substance Use Topics   • Alcohol use: No     Comment: Recovering Alcoholic since 10/2016   • Drug use: No     Types: Cocaine     Comment: None since 11/2016     Allergies   Allergen Reactions   • Shellfish Containing Products Angioedema     Other reaction(s): shellfish  "      Objective   No new labs.    Vitals:    06/03/22 1106   BP: 112/76   BP Location: Right upper arm   Patient Position: Sitting   Pulse: 74   Resp: 16   Temp: 36.6 °C (97.9 °F)   TempSrc: Temporal   SpO2: 96%   Weight: 128 kg (281 lb 6.4 oz)   Height: 1.727 m (5' 8\")         Physical Exam  Vitals and nursing note reviewed.   Constitutional:       Appearance: Normal appearance. She is well-developed.   HENT:      Head: Normocephalic and atraumatic.   Eyes:      Extraocular Movements: Extraocular movements intact.      Conjunctiva/sclera: Conjunctivae normal.   Pulmonary:      Effort: Pulmonary effort is normal.   Musculoskeletal:         General: Normal range of motion.      Cervical back: Normal range of motion.   Neurological:      General: No focal deficit present.      Mental Status: She is alert and oriented to person, place, and time.   Psychiatric:         Mood and Affect: Mood normal.         Behavior: Behavior normal.         Thought Content: Thought content normal.         Judgment: Judgment normal.         Assessment/Plan   Problem List Items Addressed This Visit        Circulatory    Essential hypertension - Primary    Current Assessment & Plan     Well controlled on current regimen           Relevant Medications    prazosin (MINIPRESS) 2 mg capsule    losartan-hydrochlorothiazide (HYZAAR) 100-25 mg per tablet    Other Relevant Orders    CBC and Differential    Comprehensive metabolic panel    Microalbumin/Creatinine Ur Random    TSH w reflex FT4       Digestive    Gastroesophageal reflux disease    Current Assessment & Plan     Unable to tolerate switch from PPI to H2 blocker.  On lower dose PPI            Relevant Medications    omeprazole (PriLOSEC) 20 mg capsule    Vitamin D deficiency    Relevant Orders    Vitamin D 25 hydroxy       Musculoskeletal    PTSD (post-traumatic stress disorder)    Current Assessment & Plan     Continue follow up with Psychiatry           Relevant Medications    " QUEtiapine (SEROquel) 300 mg tablet    buPROPion XL (WELLBUTRIN XL) 150 mg 24 hr tablet    DULoxetine (CYMBALTA) 30 mg capsule    DULoxetine (CYMBALTA) 60 mg capsule    prazosin (MINIPRESS) 2 mg capsule       Endocrine/Metabolic    Hypercholesterolemia    Current Assessment & Plan     Continue on statin therapy           Relevant Medications    atorvastatin (LIPITOR) 10 mg tablet    Other Relevant Orders    Comprehensive metabolic panel    Lipid panel    Class 3 severe obesity due to excess calories with serious comorbidity and body mass index (BMI) of 40.0 to 44.9 in adult (CMS/HCC)    Prediabetes    Current Assessment & Plan     Recommend low carb diet, moderate exercise and weight loss. Decrease intake of bread, rice, pasta, starches, juice, soda and sweets.           Relevant Orders    Comprehensive metabolic panel    Hemoglobin A1c    Microalbumin/Creatinine Ur Random       Other    Depression    Current Assessment & Plan     Continue follow up with Psychiatry           Relevant Medications    QUEtiapine (SEROquel) 300 mg tablet    buPROPion XL (WELLBUTRIN XL) 150 mg 24 hr tablet    DULoxetine (CYMBALTA) 30 mg capsule    DULoxetine (CYMBALTA) 60 mg capsule    prazosin (MINIPRESS) 2 mg capsule    Anxiety    Current Assessment & Plan     Continue follow up with Psychiatry           Relevant Medications    QUEtiapine (SEROquel) 300 mg tablet    buPROPion XL (WELLBUTRIN XL) 150 mg 24 hr tablet    DULoxetine (CYMBALTA) 30 mg capsule    DULoxetine (CYMBALTA) 60 mg capsule    prazosin (MINIPRESS) 2 mg capsule    Bipolar disorder, manic (CMS/[facility])    Current Assessment & Plan     Continue follow up with Psychiatry           Relevant Medications    QUEtiapine (SEROquel) 300 mg tablet    buPROPion XL (WELLBUTRIN XL) 150 mg 24 hr tablet    DULoxetine (CYMBALTA) 30 mg capsule    DULoxetine (CYMBALTA) 60 mg capsule    prazosin (MINIPRESS) 2 mg capsule    Schizophrenia (CMS/HCC)    Current Assessment & Plan     Continue  follow up with Psychiatry           Relevant Medications    QUEtiapine (SEROquel) 300 mg tablet    buPROPion XL (WELLBUTRIN XL) 150 mg 24 hr tablet    DULoxetine (CYMBALTA) 30 mg capsule    DULoxetine (CYMBALTA) 60 mg capsule    prazosin (MINIPRESS) 2 mg capsule    History of substance abuse (CMS/HCC)    Relevant Medications    nalTREXone (DEPADE) 50 mg tablet          Prosper Grullon MD  6/3/2022

## 2022-06-08 DIAGNOSIS — I10 ESSENTIAL HYPERTENSION: ICD-10-CM

## 2022-06-08 RX ORDER — LOSARTAN POTASSIUM AND HYDROCHLOROTHIAZIDE 25; 100 MG/1; MG/1
TABLET ORAL
Qty: 30 TABLET | Refills: 1 | Status: SHIPPED | OUTPATIENT
Start: 2022-06-08 | End: 2022-08-03

## 2022-06-28 ENCOUNTER — TELEPHONE (OUTPATIENT)
Dept: FAMILY MEDICINE | Facility: CLINIC | Age: 57
End: 2022-06-28
Payer: COMMERCIAL

## 2022-06-28 DIAGNOSIS — K21.9 GASTROESOPHAGEAL REFLUX DISEASE WITHOUT ESOPHAGITIS: ICD-10-CM

## 2022-06-28 RX ORDER — OMEPRAZOLE 20 MG/1
20 CAPSULE, DELAYED RELEASE ORAL
Qty: 90 CAPSULE | Refills: 0 | Status: SHIPPED | OUTPATIENT
Start: 2022-06-28 | End: 2022-08-03

## 2022-06-28 NOTE — TELEPHONE ENCOUNTER
Do you have enough medication for the next 5 days?: none    Did you request this medication through your pharmacy or our patient portal in the last day or two? no    Name of medication requested: Omeprazole  Medication Strength: 20 mg  Mediation Directions: daily  Before breakfaast  Quantity Requested (example 30/90):   Number of refills requested:       System Generated Preferred Pharmacy(s)  are as follows.  If more than one pharmacy displays please identify which one should be used for this call    Has the pharmacy information below been confirmed with the patient?  [facility], no longer with [address] [address]  Phone: [phone] Fax: [phone]    [place] [address]  Phone: [phone] Fax: [phone]    [place] [address]  Phone: [phone] Fax: [phone]          If necessary, provide pharmacy details below.     Is this pharmacy a mail order pharmacy?:   Pharmacy Name:   Pharmacy City:   Pharmacy Telephone:     Additional Comments:    Next Encounter with this provider: 9/6/2022

## 2022-08-03 DIAGNOSIS — I10 ESSENTIAL HYPERTENSION: ICD-10-CM

## 2022-08-03 DIAGNOSIS — K21.9 GASTROESOPHAGEAL REFLUX DISEASE WITHOUT ESOPHAGITIS: ICD-10-CM

## 2022-08-03 RX ORDER — LOSARTAN POTASSIUM AND HYDROCHLOROTHIAZIDE 25; 100 MG/1; MG/1
TABLET ORAL
Qty: 90 TABLET | Refills: 0 | Status: SHIPPED | OUTPATIENT
Start: 2022-08-03 | End: 2022-09-06

## 2022-08-03 RX ORDER — OMEPRAZOLE 20 MG/1
20 CAPSULE, DELAYED RELEASE ORAL
Qty: 90 CAPSULE | Refills: 0 | Status: SHIPPED | OUTPATIENT
Start: 2022-08-03 | End: 2022-09-06

## 2022-08-05 ENCOUNTER — OFFICE VISIT (OUTPATIENT)
Dept: FAMILY MEDICINE | Facility: CLINIC | Age: 57
End: 2022-08-05
Payer: COMMERCIAL

## 2022-08-05 ENCOUNTER — APPOINTMENT (OUTPATIENT)
Dept: LAB | Age: 57
End: 2022-08-05
Attending: FAMILY MEDICINE
Payer: COMMERCIAL

## 2022-08-05 VITALS
WEIGHT: 287.2 LBS | HEART RATE: 87 BPM | TEMPERATURE: 97.1 F | DIASTOLIC BLOOD PRESSURE: 66 MMHG | OXYGEN SATURATION: 95 % | BODY MASS INDEX: 43.53 KG/M2 | SYSTOLIC BLOOD PRESSURE: 106 MMHG | HEIGHT: 68 IN

## 2022-08-05 DIAGNOSIS — R19.7 DIARRHEA, UNSPECIFIED TYPE: Primary | ICD-10-CM

## 2022-08-05 DIAGNOSIS — I10 ESSENTIAL (PRIMARY) HYPERTENSION: ICD-10-CM

## 2022-08-05 DIAGNOSIS — R73.09 IMPAIRED GLUCOSE REGULATION: ICD-10-CM

## 2022-08-05 DIAGNOSIS — R61 NIGHT SWEATS: ICD-10-CM

## 2022-08-05 DIAGNOSIS — E78.00 PURE HYPERCHOLESTEROLEMIA, UNSPECIFIED: ICD-10-CM

## 2022-08-05 DIAGNOSIS — E55.9 VITAMIN D DEFICIENCY, UNSPECIFIED: ICD-10-CM

## 2022-08-05 DIAGNOSIS — R73.03 PREDIABETES: ICD-10-CM

## 2022-08-05 PROCEDURE — 3008F BODY MASS INDEX DOCD: CPT | Performed by: NURSE PRACTITIONER

## 2022-08-05 PROCEDURE — 99213 OFFICE O/P EST LOW 20 MIN: CPT | Performed by: NURSE PRACTITIONER

## 2022-08-05 PROCEDURE — 30099997 SPECIMEN PROCESSING

## 2022-08-05 ASSESSMENT — ENCOUNTER SYMPTOMS
CHILLS: 1
POLYDIPSIA: 1
NAUSEA: 0
DIARRHEA: 1
POLYPHAGIA: 1
WHEEZING: 0
ABDOMINAL PAIN: 1
COUGH: 0
CONSTIPATION: 1
LIGHT-HEADEDNESS: 1
FEVER: 0
FATIGUE: 1
DIAPHORESIS: 1
HEADACHES: 0
SHORTNESS OF BREATH: 0
VOMITING: 0

## 2022-08-05 NOTE — PATIENT INSTRUCTIONS
1) Patient has had recurrent diarrhea and constipation, possible food allergy, potentially lactose. Add food allergy panel to labs drawn this morning.     2) Review A1c, symptoms concerning for possible DM    3) [place] diet, PEÑA (Bananas, Rice, Applesauce, Tea, Toast) as a start and increase as tolerated.     Follow up based on labs

## 2022-08-05 NOTE — PROGRESS NOTES
[facility]  [address] [place] [address]  [phone]     Reason for visit:   Chief Complaint   Patient presents with   • Follow-up     Shes been having stomach pains, hard and swollen. This has been going on for about 3 years. Diarrhea 4-5xs a week or constipation. She's been eating with her emotions and more junk foods.  Possible lactose allergy - noticed after consuming milk with cereal.  Discuss possible diabetes - frequency, excessive thirst, night sweats      HPI   Kar Nicholas is a 57 y.o. female who presents with multiple issues        Medical History:  Past Medical History:   Diagnosis Date   • Alcoholism (CMS/[facility])     In Recovery  Since October 2016    • Anxiety    • Asthma     Remote   • Bipolar affect, depressed (CMS/[facility])    • Body mass index (BMI) 45.0-49.9, adult    • Borderline type 2 diabetes mellitus 4/15/2019   • Cocaine addiction (CMS/[facility])      None since November 2016   • Delayed emergence from general anesthesia    • Depression    • GERD (gastroesophageal reflux disease)    • History of snoring    • Hypertension    • Joint pain    • Lipid disorder    • Migraines    • Morbid obesity with BMI of 45.0-49.9, adult (CMS/[facility])    • Osteoarthritis     Knees   • Post-menopause    • Pre-diabetes    • Schizophrenia (CMS/[facility])    • Sciatica    • Sleep apnea     Uses CPAP occas- To bring CPAP Day of Sx   • Urinary incontinence        Surgical History:  Past Surgical History:   Procedure Laterality Date   • CHOLECYSTECTOMY  2018   • COLONOSCOPY     • HYSTERECTOMY  2010    PARTIAL   • JOINT REPLACEMENT      right knee   • KNEE SURGERY Right    • REDUCTION MAMMAPLASTY Bilateral    • WISDOM TOOTH EXTRACTION         Social History:  Social History     Social History Narrative   • Not on file       Family History:  Family History   Problem Relation Age of Onset   • Colon cancer Biological Mother    • Lung cancer Biological Father    • Heart failure Biological Brother    •  Heart block Biological Daughter    • Breast cancer Other    • Breast cancer Cousin        Allergies:  Shellfish containing products    Current Medications:  Current Outpatient Medications   Medication Sig Dispense Refill   • atorvastatin (LIPITOR) 10 mg tablet Take 1 tablet (10 mg total) by mouth nightly. 90 tablet 1   • buPROPion XL (WELLBUTRIN XL) 150 mg 24 hr tablet 1 tablet (150 mg total) daily.     • DULoxetine (CYMBALTA) 30 mg capsule 1 capsule (30 mg total) daily.     • DULoxetine (CYMBALTA) 60 mg capsule 1 capsule (60 mg total) daily.     • losartan-hydrochlorothiazide (HYZAAR) 100-25 mg per tablet TAKE 1 TABLET BY ORAL ROUTE ONCE DAILY 90 tablet 0   • nalTREXone (DEPADE) 50 mg tablet 1 tablet (50 mg total) daily.     • omeprazole (PriLOSEC) 20 mg capsule TAKE 1 CAPSULE (20 MG TOTAL) BY MOUTH DAILY BEFORE BREAKFAST. 90 capsule 0   • prazosin (MINIPRESS) 2 mg capsule Take 2 mg by mouth nightly.     • QUEtiapine (SEROquel) 300 mg tablet 1 tablet (300 mg total) nightly.       No current facility-administered medications for this visit.       Review of Systems:  Review of Systems   Constitutional: Positive for chills, diaphoresis (frequent sweating) and fatigue. Negative for fever.   Respiratory: Negative for cough, shortness of breath and wheezing.    Cardiovascular: Negative for chest pain.   Gastrointestinal: Positive for abdominal pain (lower abd after eating milk), constipation and diarrhea (alternating diarrhea and constipation). Negative for nausea and vomiting.   Endocrine: Positive for polydipsia and polyphagia.   Neurological: Positive for light-headedness. Negative for headaches.       Objective     Vital Signs:  Vitals:    08/05/22 1141   BP: 106/66   Pulse: 87   Temp: 36.2 °C (97.1 °F)   SpO2: 95%       BMI:  Body mass index is 43.67 kg/m².     Physical Exam  Constitutional:       Appearance: Normal appearance.   HENT:      Head: Normocephalic and atraumatic.   Cardiovascular:      Rate and Rhythm:  Normal rate and regular rhythm.      Heart sounds: Normal heart sounds.   Pulmonary:      Effort: Pulmonary effort is normal.      Breath sounds: Normal breath sounds.   Neurological:      General: No focal deficit present.      Mental Status: She is alert and oriented to person, place, and time.   Psychiatric:         Attention and Perception: Attention and perception normal.         Mood and Affect: Mood and affect normal.         Speech: Speech normal.         Behavior: Behavior normal. Behavior is cooperative.         Thought Content: Thought content normal.         Cognition and Memory: Cognition normal.         Judgment: Judgment normal.         Recent labs before today:     Lab Results   Component Value Date    WBC 5.9 04/21/2021    HGB 12.6 04/21/2021    HCT 38.1 04/21/2021     04/21/2021    CHOL 205 (H) 04/21/2021    TRIG 107 04/21/2021    HDL 65 04/21/2021    ALT 8 04/21/2021    AST 10 04/21/2021     04/21/2021    K 4.2 04/21/2021     04/21/2021    CREATININE 0.92 04/21/2021    BUN 12 04/21/2021    CO2 24 04/21/2021    TSH 1.45 04/21/2021    INR 0.9 08/03/2018    HGBA1C 5.8 (H) 04/21/2021    MICROALBUR 0.5 04/21/2021        Procedures   Assessment     Patient Instructions   1) Patient has had recurrent diarrhea and constipation, possible food allergy, potentially lactose. Add food allergy panel to labs drawn this morning.     2) Review A1c, symptoms concerning for possible DM    3) Davis diet, PEÑA (Bananas, Rice, Applesauce, Tea, Toast) as a start and increase as tolerated.     Follow up based on labs    [unfilled]  Problem List Items Addressed This Visit    None     Visit Diagnoses     Diarrhea, unspecified type    -  Primary    Relevant Orders    FOOD ALLERGY PROFILE    Night sweats        Impaired glucose regulation                 The total time spent ON THE DAY OF THE VISIT was 25 minutes, including preparing to see the patient, obtaining and reviewing separately obtained history,  performing medically appropriate examination or evaluation, counseling and educating patient/family/caregiver, ordering medications, tests or procedures, referring and communicating with other health care professionals, documenting clinical information in electronic or other record, independently interpreting and communicating results to patient/family/caregiver, and/or care coordination.             Oskar Armstrong DNP, KAREN  8/5/2022      This document was created using Dragon dictation software.  There might be some typographical errors due to this technology.

## 2022-08-06 LAB
25(OH)D3 SERPL-MCNC: 38 NG/ML (ref 30–100)
ALBUMIN SERPL-MCNC: 4.1 G/DL (ref 3.6–5.1)
ALBUMIN/CREAT UR: NORMAL MCG/MG CREAT
ALBUMIN/GLOB SERPL: 1.5 (CALC) (ref 1–2.5)
ALP SERPL-CCNC: 72 U/L (ref 37–153)
ALT SERPL-CCNC: 12 U/L (ref 6–29)
AST SERPL-CCNC: 10 U/L (ref 10–35)
BASOPHILS # BLD AUTO: 51 CELLS/UL (ref 0–200)
BASOPHILS NFR BLD AUTO: 0.8 %
BILIRUB SERPL-MCNC: 0.4 MG/DL (ref 0.2–1.2)
BUN SERPL-MCNC: 14 MG/DL (ref 7–25)
BUN/CREAT SERPL: NORMAL (CALC) (ref 6–22)
CALCIUM SERPL-MCNC: 9.7 MG/DL (ref 8.6–10.4)
CHLORIDE SERPL-SCNC: 107 MMOL/L (ref 98–110)
CHOLEST SERPL-MCNC: 185 MG/DL
CHOLEST/HDLC SERPL: 2.8 (CALC)
CO2 SERPL-SCNC: 29 MMOL/L (ref 20–32)
CREAT SERPL-MCNC: 0.95 MG/DL (ref 0.5–1.03)
CREAT UR-MCNC: 93 MG/DL (ref 20–275)
EGFRCR SERPLBLD CKD-EPI 2021: 70 ML/MIN/1.73M2
EOSINOPHIL # BLD AUTO: 109 CELLS/UL (ref 15–500)
EOSINOPHIL NFR BLD AUTO: 1.7 %
ERYTHROCYTE [DISTWIDTH] IN BLOOD BY AUTOMATED COUNT: 14.2 % (ref 11–15)
GLOBULIN SER CALC-MCNC: 2.7 G/DL (CALC) (ref 1.9–3.7)
GLUCOSE SERPL-MCNC: 96 MG/DL (ref 65–99)
HBA1C MFR BLD: 6 % OF TOTAL HGB
HCT VFR BLD AUTO: 35.7 % (ref 35–45)
HDLC SERPL-MCNC: 67 MG/DL
HGB BLD-MCNC: 12 G/DL (ref 11.7–15.5)
LDLC SERPL CALC-MCNC: 98 MG/DL (CALC)
LYMPHOCYTES # BLD AUTO: 1498 CELLS/UL (ref 850–3900)
LYMPHOCYTES NFR BLD AUTO: 23.4 %
MCH RBC QN AUTO: 27.4 PG (ref 27–33)
MCHC RBC AUTO-ENTMCNC: 33.6 G/DL (ref 32–36)
MCV RBC AUTO: 81.5 FL (ref 80–100)
MICROALBUMIN UR-MCNC: <0.2 MG/DL
MONOCYTES # BLD AUTO: 454 CELLS/UL (ref 200–950)
MONOCYTES NFR BLD AUTO: 7.1 %
NEUTROPHILS # BLD AUTO: 4288 CELLS/UL (ref 1500–7800)
NEUTROPHILS NFR BLD AUTO: 67 %
NONHDLC SERPL-MCNC: 118 MG/DL (CALC)
PLATELET # BLD AUTO: 274 THOUSAND/UL (ref 140–400)
PMV BLD REES-ECKER: 11.4 FL (ref 7.5–12.5)
POTASSIUM SERPL-SCNC: 4.1 MMOL/L (ref 3.5–5.3)
PROT SERPL-MCNC: 6.8 G/DL (ref 6.1–8.1)
RBC # BLD AUTO: 4.38 MILLION/UL (ref 3.8–5.1)
SODIUM SERPL-SCNC: 144 MMOL/L (ref 135–146)
TRIGL SERPL-MCNC: 102 MG/DL
TSH SERPL-ACNC: 1.13 MIU/L (ref 0.4–4.5)
WBC # BLD AUTO: 6.4 THOUSAND/UL (ref 3.8–10.8)

## 2022-08-19 ENCOUNTER — TELEPHONE (OUTPATIENT)
Dept: FAMILY MEDICINE | Facility: CLINIC | Age: 57
End: 2022-08-19

## 2022-08-19 NOTE — TELEPHONE ENCOUNTER
Request for Medical Advice (NON-URGENT)   Patient PCP: Prosper Grullon MD  New or Existing Issue: existing  Question or Concern: Pt called complaining of cramping, nausea and lethargy. Pt was waiting on results from allergy tests, but its showing in chart as not collected. Pt is requesting someone follow up with her and reissue the test if it has, in fact, not been colleted.  Preferred Pharmacy:   [facility] - [address] [address]  Phone: [phone] Fax: [phone]      The practice will reach out to discuss your Medical Question or Concern within 2 business days.

## 2022-08-22 NOTE — TELEPHONE ENCOUNTER
Looks like theres an order in the system - not sure why it wasn't done?  Maybe we can print and fax for the patient?

## 2022-08-29 ENCOUNTER — TELEPHONE (OUTPATIENT)
Dept: FAMILY MEDICINE | Facility: CLINIC | Age: 57
End: 2022-08-29
Payer: COMMERCIAL

## 2022-08-29 NOTE — TELEPHONE ENCOUNTER
Spoke with pt. States she has been having intermittent trouble with constipation and diarrhea for the past 3 years. States since yesterday she has been having severe abdominal pain. Her abd is bloated and hard. She is unable to pass flatulence. Pt advised she needs to be evaluated in ED to r/o blockage. Pt VU. She will proceed to [place] [facility]

## 2022-08-29 NOTE — TELEPHONE ENCOUNTER
Patient called with complaints of severe abdominal pain and swollen stomach. Call transferred to [facility] for triage.

## 2022-09-06 ENCOUNTER — APPOINTMENT (OUTPATIENT)
Dept: LAB | Age: 57
End: 2022-09-06
Attending: NURSE PRACTITIONER
Payer: COMMERCIAL

## 2022-09-06 ENCOUNTER — OFFICE VISIT (OUTPATIENT)
Dept: FAMILY MEDICINE | Facility: CLINIC | Age: 57
End: 2022-09-06
Payer: COMMERCIAL

## 2022-09-06 VITALS
OXYGEN SATURATION: 98 % | SYSTOLIC BLOOD PRESSURE: 120 MMHG | BODY MASS INDEX: 42.89 KG/M2 | WEIGHT: 283 LBS | DIASTOLIC BLOOD PRESSURE: 80 MMHG | HEIGHT: 68 IN | HEART RATE: 82 BPM | TEMPERATURE: 98.2 F | RESPIRATION RATE: 16 BRPM

## 2022-09-06 DIAGNOSIS — K21.9 GASTROESOPHAGEAL REFLUX DISEASE WITHOUT ESOPHAGITIS: ICD-10-CM

## 2022-09-06 DIAGNOSIS — I10 ESSENTIAL HYPERTENSION: ICD-10-CM

## 2022-09-06 DIAGNOSIS — E78.00 HYPERCHOLESTEROLEMIA: ICD-10-CM

## 2022-09-06 DIAGNOSIS — Z92.89 HISTORY OF RECENT HOSPITALIZATION: ICD-10-CM

## 2022-09-06 DIAGNOSIS — R73.03 PREDIABETES: Primary | ICD-10-CM

## 2022-09-06 DIAGNOSIS — R19.7 DIARRHEA, UNSPECIFIED: ICD-10-CM

## 2022-09-06 DIAGNOSIS — E55.9 VITAMIN D DEFICIENCY: ICD-10-CM

## 2022-09-06 DIAGNOSIS — R19.8 ALTERNATING CONSTIPATION AND DIARRHEA: ICD-10-CM

## 2022-09-06 PROCEDURE — 99214 OFFICE O/P EST MOD 30 MIN: CPT | Performed by: FAMILY MEDICINE

## 2022-09-06 PROCEDURE — 30099997 SPECIMEN PROCESSING

## 2022-09-06 PROCEDURE — 3008F BODY MASS INDEX DOCD: CPT | Performed by: FAMILY MEDICINE

## 2022-09-06 RX ORDER — OMEPRAZOLE 20 MG/1
20 CAPSULE, DELAYED RELEASE ORAL
Qty: 90 CAPSULE | Refills: 0 | COMMUNITY
Start: 2022-09-06 | End: 2022-11-30

## 2022-09-06 RX ORDER — LOSARTAN POTASSIUM AND HYDROCHLOROTHIAZIDE 25; 100 MG/1; MG/1
1 TABLET ORAL DAILY
Qty: 90 TABLET | Refills: 0
Start: 2022-09-06 | End: 2022-09-28 | Stop reason: SDUPTHER

## 2022-09-06 RX ORDER — ATORVASTATIN CALCIUM 10 MG/1
10 TABLET, FILM COATED ORAL NIGHTLY
Qty: 90 TABLET | Refills: 1 | COMMUNITY
Start: 2022-09-06 | End: 2022-11-01

## 2022-09-06 ASSESSMENT — ENCOUNTER SYMPTOMS
ABDOMINAL PAIN: 0
CONSTIPATION: 1
DIARRHEA: 1

## 2022-09-06 ASSESSMENT — PAIN SCALES - GENERAL: PAINLEVEL: 0-NO PAIN

## 2022-09-06 NOTE — PROGRESS NOTES
Daily Progress Note       Patient ID: Kar Nicholas is a 57 y.o. female.    HPI    57-year-old female presents for hospital follow-up.    Recently admitted to [facility] between 8/29/2022 through 8/31/2022.  She went to the hospital with lower abdominal pain.  CT of the abdomen suggested possibility of an early or incomplete bowel obstruction.  Surgery was consulted.  NGT was placed.  Responded well to conservative management and was discharged home.  Recommended outpatient colonoscopy.  She reports she is doing better.  Has hx of intermittent diarrhea/constipation that will be triggered by certain foods.  Has noticed granola and dairy as a trigger.  Scheduled with GI on 9/28 to set up a colonoscopy.        Hx of Schizophrenia, Bipolar - Manic, PTSD and Anxiety - has been following up with Psychiatry - now with [facility] Team in [place].  Recent adjustments that she feels she is doing better on.  Currently on Wellbutrin XL, Cymbalta, Seroquel and Prazosin.      Essential Hypertension - currently on losartan-HCTZ 100-25 mg daily.      Hypercholesterolemia - currently on atorvastatin 10 mg qhs - tolerating with no complaints.      GERD - currently on omeprazole 20 mg daily.  Attempted to switch to H2 blocker but had worsening of symptoms.      Vitamin D Deficiency - was on Ergocalciferol 50,000 IU once weekly x 12 weeks, then on Vitamin D 2000 IU once daily     History of substance abuse     The following have been reviewed and updated as appropriate in this visit:   Allergies  Meds  Problems         Review of Systems   Gastrointestinal: Positive for constipation and diarrhea. Negative for abdominal pain.             Current Outpatient Medications   Medication Sig Dispense Refill    atorvastatin (LIPITOR) 10 mg tablet Take 1 tablet (10 mg total) by mouth nightly. 90 tablet 1    buPROPion XL (WELLBUTRIN XL) 150 mg 24 hr tablet 1 tablet (150 mg total) daily.      DULoxetine (CYMBALTA) 30 mg capsule 1  capsule (30 mg total) daily.      DULoxetine (CYMBALTA) 60 mg capsule 1 capsule (60 mg total) daily.      losartan-hydrochlorothiazide (HYZAAR) 100-25 mg per tablet Take 1 tablet by mouth daily. 90 tablet 0    nalTREXone (DEPADE) 50 mg tablet 1 tablet (50 mg total) daily.      omeprazole (PriLOSEC) 20 mg capsule Take 1 capsule (20 mg total) by mouth daily before breakfast. 90 capsule 0    prazosin (MINIPRESS) 2 mg capsule Take 2 mg by mouth nightly.      QUEtiapine (SEROquel) 300 mg tablet 1 tablet (300 mg total) nightly.       No current facility-administered medications for this visit.     Past Medical History:   Diagnosis Date    Alcoholism (CMS/[facility])     In Recovery  Since October 2016     Anxiety     Asthma     Remote    Bipolar affect, depressed (CMS/[facility])     Body mass index (BMI) 45.0-49.9, adult     Borderline type 2 diabetes mellitus 4/15/2019    Cocaine addiction (CMS/[facility])      None since November 2016    Delayed emergence from general anesthesia     Depression     GERD (gastroesophageal reflux disease)     History of snoring     Hypertension     Joint pain     Lipid disorder     Migraines     Morbid obesity with BMI of 45.0-49.9, adult (CMS/[facility])     Osteoarthritis     Knees    Post-menopause     Pre-diabetes     Schizophrenia (CMS/[facility])     Sciatica     Sleep apnea     Uses CPAP occas- To bring CPAP Day of Sx    Urinary incontinence      Family History   Problem Relation Age of Onset    Colon cancer Biological Mother     Lung cancer Biological Father     Heart failure Biological Brother     Heart block Biological Daughter     Breast cancer Other     Breast cancer Cousin      Past Surgical History:   Procedure Laterality Date    CHOLECYSTECTOMY  2018    COLONOSCOPY      HYSTERECTOMY  2010    PARTIAL    JOINT REPLACEMENT      right knee    KNEE SURGERY Right     REDUCTION MAMMAPLASTY Bilateral     WISDOM TOOTH EXTRACTION       Social History     Tobacco Use     Smoking status: Current Some Day Smoker     Packs/day: 0.25     Years: 30.00     Pack years: 7.50    Smokeless tobacco: Never Used    Tobacco comment: 3 cigarettes a week   Vaping Use    Vaping Use: Never used   Substance Use Topics    Alcohol use: Not Currently     Comment: Recovering Alcoholic since 10/2016    Drug use: No     Types: Cocaine     Comment: None since 11/2016     Allergies   Allergen Reactions    Shellfish Containing Products Angioedema     Other reaction(s): shellfish       Objective     Component      Latest Ref Rng & Units 8/5/2022 8/5/2022 8/5/2022          11:31 AM 11:31 AM 11:31 AM   WBC      3.8 - 10.8 Thousand/uL 6.4     RBC      3.80 - 5.10 Million/uL 4.38     Hemoglobin      11.7 - 15.5 g/dL 12.0     Hematocrit      35.0 - 45.0 % 35.7     MCV      80.0 - 100.0 fL 81.5     MCH      27.0 - 33.0 pg 27.4     MCHC      32.0 - 36.0 g/dL 33.6     RDW      11.0 - 15.0 % 14.2     Platelets      140 - 400 Thousand/uL 274     MPV      7.5 - 12.5 fL 11.4     Absolute Neutrophils      1,500 - 7,800 cells/uL 4,288     Absolute Lymphocytes      850 - 3,900 cells/uL 1,498     Absolute Monocytes      200 - 950 cells/uL 454     Absolute Eosinophils      15 - 500 cells/uL 109     Absolute Basophils      0 - 200 cells/uL 51     Neutrophils      % 67     Lymphocytes      % 23.4     Monocytes      % 7.1     Eosinophils      % 1.7     Basophils      % 0.8     Glucose      65 - 99 mg/dL  96    BUN      7 - 25 mg/dL  14    Creatinine      0.50 - 1.03 mg/dL  0.95    eGFR      > OR = 60 mL/min/1.73m2  70    Bun/Creatinine Ratio      6 - 22 (calc)  NOT APPLICABLE    Sodium      135 - 146 mmol/L  144    Potassium      3.5 - 5.3 mmol/L  4.1    Chloride      98 - 110 mmol/L  107    CO2      20 - 32 mmol/L  29    Calcium      8.6 - 10.4 mg/dL  9.7    Total Protein      6.1 - 8.1 g/dL  6.8    Albumin      3.6 - 5.1 g/dL  4.1    Globulin      1.9 - 3.7 g/dL (calc)  2.7    ALBUMIN/GLOBULIN RATIO      1.0 - 2.5 (calc)   1.5    Bilirubin, Total      0.2 - 1.2 mg/dL  0.4    Alkaline Phosphatase      37 - 153 U/L  72    AST (SGOT)      10 - 35 U/L  10    ALT (SGPT)      6 - 29 U/L  12    Cholesterol      <200 mg/dL      HDL      > OR = 50 mg/dL      Triglycerides      <150 mg/dL      LDL Calculated      mg/dL (calc)      Chol/Hdlc Ratio      <5.0 (calc)      Non-HDL, Calculated      <130 mg/dL (calc)      Creatinine, Urine      20 - 275 mg/dL      Microalb, Ur      See Note: mg/dL      Microalb/Creat Ratio      <30 mcg/mg creat      Hemoglobin A1C      <5.7 % of total Hgb   6.0 (H)   TSH W/Reflex To Ft4      0.40 - 4.50 mIU/L      Vit D, 25-Hydroxy      30 - 100 ng/mL        Component      Latest Ref Rng & Units 8/5/2022 8/5/2022 8/5/2022 8/5/2022          11:31 AM 11:31 AM 11:31 AM 11:31 AM   WBC      3.8 - 10.8 Thousand/uL       RBC      3.80 - 5.10 Million/uL       Hemoglobin      11.7 - 15.5 g/dL       Hematocrit      35.0 - 45.0 %       MCV      80.0 - 100.0 fL       MCH      27.0 - 33.0 pg       MCHC      32.0 - 36.0 g/dL       RDW      11.0 - 15.0 %       Platelets      140 - 400 Thousand/uL       MPV      7.5 - 12.5 fL       Absolute Neutrophils      1,500 - 7,800 cells/uL       Absolute Lymphocytes      850 - 3,900 cells/uL       Absolute Monocytes      200 - 950 cells/uL       Absolute Eosinophils      15 - 500 cells/uL       Absolute Basophils      0 - 200 cells/uL       Neutrophils      %       Lymphocytes      %       Monocytes      %       Eosinophils      %       Basophils      %       Glucose      65 - 99 mg/dL       BUN      7 - 25 mg/dL       Creatinine      0.50 - 1.03 mg/dL       eGFR      > OR = 60 mL/min/1.73m2       Bun/Creatinine Ratio      6 - 22 (calc)       Sodium      135 - 146 mmol/L       Potassium      3.5 - 5.3 mmol/L       Chloride      98 - 110 mmol/L       CO2      20 - 32 mmol/L       Calcium      8.6 - 10.4 mg/dL       Total Protein      6.1 - 8.1 g/dL       Albumin      3.6 - 5.1 g/dL      "  Globulin      1.9 - 3.7 g/dL (calc)       ALBUMIN/GLOBULIN RATIO      1.0 - 2.5 (calc)       Bilirubin, Total      0.2 - 1.2 mg/dL       Alkaline Phosphatase      37 - 153 U/L       AST (SGOT)      10 - 35 U/L       ALT (SGPT)      6 - 29 U/L       Cholesterol      <200 mg/dL 185      HDL      > OR = 50 mg/dL 67      Triglycerides      <150 mg/dL 102      LDL Calculated      mg/dL (calc) 98      Chol/Hdlc Ratio      <5.0 (calc) 2.8      Non-HDL, Calculated      <130 mg/dL (calc) 118      Creatinine, Urine      20 - 275 mg/dL  93     Microalb, Ur      See Note: mg/dL  <0.2     Microalb/Creat Ratio      <30 mcg/mg creat  NOTE     Hemoglobin A1C      <5.7 % of total Hgb       TSH W/Reflex To Ft4      0.40 - 4.50 mIU/L   1.13    Vit D, 25-Hydroxy      30 - 100 ng/mL    38       Vitals:    09/06/22 1107   BP: 120/80   BP Location: Left upper arm   Patient Position: Sitting   Pulse: 82   Resp: 16   Temp: 36.8 °C (98.2 °F)   TempSrc: Temporal   SpO2: 98%   Weight: 128 kg (283 lb)   Height: 1.727 m (5' 8\")         Physical Exam  Vitals and nursing note reviewed.   Constitutional:       Appearance: Normal appearance. She is well-developed.   HENT:      Head: Normocephalic and atraumatic.   Eyes:      Extraocular Movements: Extraocular movements intact.      Conjunctiva/sclera: Conjunctivae normal.   Pulmonary:      Effort: Pulmonary effort is normal.   Musculoskeletal:         General: Normal range of motion.      Cervical back: Normal range of motion.   Neurological:      General: No focal deficit present.      Mental Status: She is alert and oriented to person, place, and time.   Psychiatric:         Mood and Affect: Mood normal.         Behavior: Behavior normal.         Thought Content: Thought content normal.         Judgment: Judgment normal.         Assessment/Plan   Problem List Items Addressed This Visit        Circulatory    Essential hypertension    Current Assessment & Plan     Well controlled on current " regimen           Relevant Medications    losartan-hydrochlorothiazide (HYZAAR) 100-25 mg per tablet       Digestive    Gastroesophageal reflux disease    Current Assessment & Plan     Unable to tolerate switch from PPI to H2 blocker.  On lower dose PPI            Relevant Medications    omeprazole (PriLOSEC) 20 mg capsule       Endocrine/Metabolic    Prediabetes - Primary    Current Assessment & Plan     Recommend low carb diet, moderate exercise and weight loss. Decrease intake of bread, rice, pasta, starches, juice, soda and sweets.           Vitamin D deficiency    Current Assessment & Plan     Continue on supplementation              Other    Hypercholesterolemia    Current Assessment & Plan     Continue on statin therapy           Relevant Medications    atorvastatin (LIPITOR) 10 mg tablet      Other Visit Diagnoses     History of recent hospitalization        Hospital records were reviewed    Alternating constipation and diarrhea        Follow up with GI as scheduled.  Will need colonoscopy.  Allergy testing done earlier today      Labs reviewed with patient     Prosper Grullon MD  9/6/2022

## 2022-09-07 LAB
ALMOND IGE QN: <0.1 KU/L
CASHEW NUT IGE QN: <0.1 KU/L
CODFISH IGE QN: <0.1 KU/L
DEPRECATED ALMOND IGE RAST QL: 0
DEPRECATED CASHEW NUT IGE RAST QL: 0
DEPRECATED CODFISH IGE RAST QL: 0
DEPRECATED EGG WHITE IGE RAST QL: 0
DEPRECATED HAZELNUT IGE RAST QL: 0
DEPRECATED MILK IGE RAST QL: 0
DEPRECATED PEANUT IGE RAST QL: 0
DEPRECATED SALMON IGE RAST QL: 0
DEPRECATED SCALLOP IGE RAST QL: 2
DEPRECATED SESAME SEED IGE RAST QL: 0
DEPRECATED SHRIMP IGE RAST QL: 3
DEPRECATED SOYBEAN IGE RAST QL: 0
DEPRECATED TUNA IGE RAST QL: 0
DEPRECATED WALNUT IGE RAST QL: 0
DEPRECATED WHEAT IGE RAST QL: 0
EGG WHITE IGE QN: <0.1 KU/L
HAZELNUT IGE QN: <0.1 KU/L
MILK IGE QN: <0.1 KU/L
PEANUT IGE QN: <0.1 KU/L
REF LAB TEST REF RANGE: NORMAL
SALMON IGE QN: <0.1 KU/L
SCALLOP IGE QN: 2.91 KU/L
SESAME SEED IGE QN: <0.1 KU/L
SHRIMP IGE QN: 8.73 KU/L
SOYBEAN IGE QN: <0.1 KU/L
TUNA IGE QN: <0.1 KU/L
WALNUT IGE QN: <0.1 KU/L
WHEAT IGE QN: <0.1 KU/L

## 2022-09-14 ENCOUNTER — TELEPHONE (OUTPATIENT)
Dept: FAMILY MEDICINE | Facility: CLINIC | Age: 57
End: 2022-09-14

## 2022-09-14 NOTE — TELEPHONE ENCOUNTER
Pt is back in the hospital for continuing stomach - she is inquiring if Dr ng'd her test results for food allergies.    Please call back as soon as you can. Thanks!

## 2022-09-14 NOTE — TELEPHONE ENCOUNTER
Patient appears to have possible allergy to scallops but a more likely allergy to shrimp.  Neither levels are significantly high.  Would recommend following up with an allergist to discuss allergy challenge test

## 2022-09-15 NOTE — ASSESSMENT & PLAN NOTE
Reflux precautions given. EGD showed small hiatal hernia otherwise normal - will attempt to switch PPI to H2 blocker.    15-Sep-2022 14:23

## 2022-09-27 DIAGNOSIS — I10 ESSENTIAL HYPERTENSION: ICD-10-CM

## 2022-09-27 NOTE — TELEPHONE ENCOUNTER
Medication Request   Patient PCP: Prosper Grullon MD  Next Office Visit: Visit date not found  Has this provider prescribed this medication before?: Yes    Medication Name: Losartan  Medication Dose: 100-25 mg  Medication Frequency: 90 tablets  Preferred Pharmacy:   [facility] - [address] [address]  Phone: [phone] Fax: [phone]    Patient states pharmacy says previous prescription was discontinued.    Please allow 2 business days for your provider to send your medication request or to reach out to discuss.

## 2022-09-27 NOTE — TELEPHONE ENCOUNTER
Medication Request   Patient PCP: Prosper Grullon MD  Next Office Visit: Visit date not found  Has this provider prescribed this medication before?: Yes   Medication Name: Losartan  Medication Dose: 100-25 mg  Medication Frequency: 90  Preferred Pharmacy:   [facility] - [address] [address]  Phone: [phone] Fax: [phone]    Pharmacy is stating that previous prescription was discontinued.    Please allow 2 business days for your provider to send your medication request or to reach out to discuss.

## 2022-09-28 RX ORDER — LOSARTAN POTASSIUM AND HYDROCHLOROTHIAZIDE 25; 100 MG/1; MG/1
1 TABLET ORAL DAILY
Qty: 30 TABLET | Refills: 3 | Status: SHIPPED | OUTPATIENT
Start: 2022-09-28 | End: 2022-12-30

## 2022-10-04 ENCOUNTER — TELEPHONE (OUTPATIENT)
Dept: FAMILY MEDICINE | Facility: CLINIC | Age: 57
End: 2022-10-04
Payer: COMMERCIAL

## 2022-10-04 DIAGNOSIS — Z12.31 ENCOUNTER FOR SCREENING MAMMOGRAM FOR MALIGNANT NEOPLASM OF BREAST: Primary | ICD-10-CM

## 2022-10-04 NOTE — TELEPHONE ENCOUNTER
Pt called to request that Dr. Grullon can give her another letter for her Therapy Pet that he's given her previously. Pt also wants a script to get a Mammogram. Pt can be reached at [phone] Anytime.

## 2022-10-04 NOTE — LETTER
October 4, 2022     Patient: Kar Nicholas  YOB: 1965    To Whom it May Concern:    Kar Nicholas is currently a patient under my care. Kar is being treated for schizophrenia, bipolar disorder, mal, PTSD and Anxiety. She is concurrently under psychiatric care and also participates in group therapy. Kar feels that she would benefit from a support animal and I endorse her decision. She feels that the additional of a cat would give her purpose, give her something to love and to take care of which would help her to cope with her anxiety and other psychiatric conditions.     If you have any questions or concerns, please don't hesitate to call.      Sincerely,          Prosper Grullon MD

## 2022-10-07 ENCOUNTER — TELEPHONE (OUTPATIENT)
Dept: FAMILY MEDICINE | Facility: CLINIC | Age: 57
End: 2022-10-07
Payer: COMMERCIAL

## 2022-10-07 NOTE — TELEPHONE ENCOUNTER
Patient called to ask about hormonal imbalance.   She believes this could be contributing to her depression.  She also reports her abdomen has been swollen and looks like she is 8 months pregnant for about a year.       She states she spoke to her psychologist about it and they do not believe this is contributing.  She has had a partial hysterectomy in the past    Number provided for gynecology as she has not had a visit in over 10 years.      She will call back if she can't get an appointment or needs another number as she did not have any way to write a number down.     She reports she is not having any new thoughts of self harm different than every other day for the last 10 years.  Has psych team she works with

## 2022-10-07 NOTE — TELEPHONE ENCOUNTER
Kar called to discuss recommendations that Dr. Grullon may have. She felt that since her partial hysterectomy that her hormones have been causing her to have severe depression with thoughts of harming herself. Patient connected to back line for further triage.

## 2022-10-09 NOTE — PERIOPERATIVE NURSING NOTE
Patient arrive in PACU very somulent, sedated,  Unable to identify self or keep eyes open for several seconds. Snoring, unable to assess neurovascular or spinal level   actual

## 2022-10-13 ENCOUNTER — HOSPITAL ENCOUNTER (OUTPATIENT)
Dept: RADIOLOGY | Age: 57
Discharge: HOME | End: 2022-10-13
Attending: FAMILY MEDICINE
Payer: COMMERCIAL

## 2022-10-13 DIAGNOSIS — Z12.31 ENCOUNTER FOR SCREENING MAMMOGRAM FOR MALIGNANT NEOPLASM OF BREAST: ICD-10-CM

## 2022-10-13 PROCEDURE — 77063 BREAST TOMOSYNTHESIS BI: CPT

## 2022-10-14 ENCOUNTER — TELEPHONE (OUTPATIENT)
Dept: FAMILY MEDICINE | Facility: CLINIC | Age: 57
End: 2022-10-14
Payer: COMMERCIAL

## 2022-11-01 DIAGNOSIS — E78.00 HYPERCHOLESTEROLEMIA: ICD-10-CM

## 2022-11-01 RX ORDER — ATORVASTATIN CALCIUM 10 MG/1
TABLET, FILM COATED ORAL
Qty: 90 TABLET | Refills: 1 | Status: SHIPPED | OUTPATIENT
Start: 2022-11-01 | End: 2023-05-30

## 2022-11-30 DIAGNOSIS — K21.9 GASTROESOPHAGEAL REFLUX DISEASE WITHOUT ESOPHAGITIS: ICD-10-CM

## 2022-11-30 RX ORDER — OMEPRAZOLE 20 MG/1
20 CAPSULE, DELAYED RELEASE ORAL
Qty: 90 CAPSULE | Refills: 0 | Status: SHIPPED | OUTPATIENT
Start: 2022-11-30 | End: 2023-01-25

## 2022-12-07 ENCOUNTER — TELEPHONE (OUTPATIENT)
Dept: FAMILY MEDICINE | Facility: CLINIC | Age: 57
End: 2022-12-07

## 2022-12-07 NOTE — TELEPHONE ENCOUNTER
Patient called to speak with Dr Grullon. She states that she needs a recommendation for Home care. She is having trouble getting around the house by herself, also needs help with her medication and transportation. Requesting a callback.

## 2022-12-08 NOTE — TELEPHONE ENCOUNTER
Patient called again today and is stating that she is having trouble walking and experiencing incontinence and having a lot of difficulty doing things on her own. She would like a call back to discuss what she would need to do to get home care services.

## 2022-12-23 ENCOUNTER — OFFICE VISIT (OUTPATIENT)
Dept: FAMILY MEDICINE | Facility: CLINIC | Age: 57
End: 2022-12-23
Payer: COMMERCIAL

## 2022-12-23 VITALS
BODY MASS INDEX: 44.41 KG/M2 | SYSTOLIC BLOOD PRESSURE: 120 MMHG | TEMPERATURE: 97.8 F | RESPIRATION RATE: 16 BRPM | DIASTOLIC BLOOD PRESSURE: 70 MMHG | WEIGHT: 293 LBS | OXYGEN SATURATION: 98 % | HEART RATE: 74 BPM | HEIGHT: 68 IN

## 2022-12-23 DIAGNOSIS — M79.604 CHRONIC PAIN OF BOTH LOWER EXTREMITIES: Primary | ICD-10-CM

## 2022-12-23 DIAGNOSIS — R32 URINARY INCONTINENCE, UNSPECIFIED TYPE: ICD-10-CM

## 2022-12-23 DIAGNOSIS — R29.898 WEAKNESS OF BOTH LOWER EXTREMITIES: ICD-10-CM

## 2022-12-23 DIAGNOSIS — G89.29 CHRONIC PAIN OF BOTH LOWER EXTREMITIES: Primary | ICD-10-CM

## 2022-12-23 DIAGNOSIS — M79.605 CHRONIC PAIN OF BOTH LOWER EXTREMITIES: Primary | ICD-10-CM

## 2022-12-23 DIAGNOSIS — M54.16 LUMBAR RADICULOPATHY: ICD-10-CM

## 2022-12-23 DIAGNOSIS — Z91.81 HISTORY OF FALL: ICD-10-CM

## 2022-12-23 PROCEDURE — 3008F BODY MASS INDEX DOCD: CPT | Performed by: FAMILY MEDICINE

## 2022-12-23 PROCEDURE — 99213 OFFICE O/P EST LOW 20 MIN: CPT | Performed by: FAMILY MEDICINE

## 2022-12-23 ASSESSMENT — ENCOUNTER SYMPTOMS
WEAKNESS: 1
BACK PAIN: 1
ARTHRALGIAS: 1

## 2022-12-23 NOTE — PROGRESS NOTES
Daily Progress Note       Patient ID: Kar Nicholas is a 57 y.o. female.    HPI    57 year old female presents for follow up visit.      She reports she is having a lot of pain.  Has been having trouble walking.  Having trouble standing up from the seated position.  Has been having weakness.  Also having sciatica pain to the left.  She has had multiple falls.     She reports symptoms of urinary incontinence.        The following have been reviewed and updated as appropriate in this visit:   Allergies  Meds  Problems       Review of Systems   Genitourinary: Positive for urgency.   Musculoskeletal: Positive for arthralgias, back pain and gait problem.   Neurological: Positive for weakness.             Current Outpatient Medications   Medication Sig Dispense Refill   • atorvastatin (LIPITOR) 10 mg tablet TAKE ONE TABLET BY MOUTH AT BEDTIME 90 tablet 1   • buPROPion XL (WELLBUTRIN XL) 150 mg 24 hr tablet 1 tablet (150 mg total) daily.     • DULoxetine (CYMBALTA) 30 mg capsule 1 capsule (30 mg total) daily.     • DULoxetine (CYMBALTA) 60 mg capsule 1 capsule (60 mg total) daily.     • losartan-hydrochlorothiazide (HYZAAR) 100-25 mg per tablet Take 1 tablet by mouth daily. 30 tablet 3   • nalTREXone (DEPADE) 50 mg tablet 1 tablet (50 mg total) daily.     • omeprazole (PriLOSEC) 20 mg capsule TAKE 1 CAPSULE (20 MG TOTAL) BY MOUTH DAILY BEFORE BREAKFAST. 90 capsule 0   • prazosin (MINIPRESS) 2 mg capsule Take 2 mg by mouth nightly.     • QUEtiapine (SEROquel) 300 mg tablet 1 tablet (300 mg total) nightly.       No current facility-administered medications for this visit.     Past Medical History:   Diagnosis Date   • Alcoholism (CMS/HCC)     In Recovery  Since October 2016    • Anxiety    • Asthma     Remote   • Bipolar affect, depressed (CMS/[facility])    • Body mass index (BMI) 45.0-49.9, adult    • Borderline type 2 diabetes mellitus 4/15/2019   • Cocaine addiction (CMS/[facility])      None since November 2016   • Delayed  "emergence from general anesthesia    • Depression    • GERD (gastroesophageal reflux disease)    • History of snoring    • Hypertension    • Joint pain    • Lipid disorder    • Migraines    • Morbid obesity with BMI of 45.0-49.9, adult (CMS/HCC)    • Osteoarthritis     Knees   • Post-menopause    • Pre-diabetes    • Schizophrenia (CMS/HCC)    • Sciatica    • Sleep apnea     Uses CPAP occas- To bring CPAP Day of Sx   • Urinary incontinence      Family History   Problem Relation Age of Onset   • Colon cancer Biological Mother    • Lung cancer Biological Father    • Heart failure Biological Brother    • Heart block Biological Daughter    • Breast cancer Other    • Breast cancer Cousin      Past Surgical History:   Procedure Laterality Date   • CHOLECYSTECTOMY  2018   • COLONOSCOPY     • HYSTERECTOMY  2010    PARTIAL   • JOINT REPLACEMENT      right knee   • KNEE SURGERY Right    • REDUCTION MAMMAPLASTY Bilateral    • WISDOM TOOTH EXTRACTION       Social History     Tobacco Use   • Smoking status: Some Days     Packs/day: 0.25     Years: 30.00     Pack years: 7.50     Types: Cigarettes   • Smokeless tobacco: Never   • Tobacco comments:     3 cigarettes a week   Vaping Use   • Vaping Use: Never used   Substance Use Topics   • Alcohol use: Not Currently     Comment: Recovering Alcoholic since 10/2016   • Drug use: No     Types: Cocaine     Comment: None since 11/2016     Allergies   Allergen Reactions   • Shellfish Containing Products Angioedema     Other reaction(s): shellfish       Objective   No new labs.    Vitals:    12/23/22 1118   BP: 120/70   BP Location: Left upper arm   Patient Position: Sitting   Pulse: 74   Resp: 16   Temp: 36.6 °C (97.8 °F)   TempSrc: Temporal   SpO2: 98%   Weight: 135 kg (298 lb)   Height: 1.727 m (5' 8\")         Physical Exam  Vitals and nursing note reviewed.   Constitutional:       Appearance: Normal appearance. She is well-developed.   HENT:      Head: Normocephalic and atraumatic. "   Eyes:      Extraocular Movements: Extraocular movements intact.      Conjunctiva/sclera: Conjunctivae normal.   Pulmonary:      Effort: Pulmonary effort is normal.   Musculoskeletal:         General: Normal range of motion.      Cervical back: Normal range of motion.   Neurological:      General: No focal deficit present.      Mental Status: She is alert and oriented to person, place, and time.   Psychiatric:         Mood and Affect: Mood normal.         Behavior: Behavior normal.         Thought Content: Thought content normal.         Judgment: Judgment normal.         Assessment/Plan   Problem List Items Addressed This Visit    None  Visit Diagnoses     Chronic pain of both lower extremities    -  Primary    Will refer to [facility] for further evaluation    Relevant Orders    Ambulatory referral to Physical Medicine Rehab    Lumbar radiculopathy        Will refer to [facility] for further evaluation    Relevant Orders    Ambulatory referral to Physical Medicine Rehab    History of fall        Will refer to [facility] for further evaluation    Relevant Orders    Ambulatory referral to Physical Medicine Rehab    Weakness of both lower extremities        Will refer to [facility] for further evaluation    Relevant Orders    Ambulatory referral to Physical Medicine Rehab    Urinary incontinence, unspecified type        Will refer to Urology at this time for further evaluation     Relevant Orders    Ambulatory referral to Urology          Prosper Grullon MD  12/23/2022

## 2022-12-27 ENCOUNTER — TELEPHONE (OUTPATIENT)
Dept: FAMILY MEDICINE | Facility: CLINIC | Age: 57
End: 2022-12-27

## 2022-12-27 NOTE — TELEPHONE ENCOUNTER
Patient called to speak with Dr Grullon or KAREN. She wants to know if her Home health care forms were completed. If anyone has any questions for her please give her a call.

## 2022-12-30 DIAGNOSIS — I10 ESSENTIAL HYPERTENSION: ICD-10-CM

## 2022-12-30 RX ORDER — LOSARTAN POTASSIUM AND HYDROCHLOROTHIAZIDE 25; 100 MG/1; MG/1
1 TABLET ORAL DAILY
Qty: 30 TABLET | Refills: 2 | Status: SHIPPED | OUTPATIENT
Start: 2022-12-30 | End: 2023-02-23

## 2023-01-19 ENCOUNTER — TELEPHONE (OUTPATIENT)
Dept: FAMILY MEDICINE | Facility: CLINIC | Age: 58
End: 2023-01-19

## 2023-01-19 NOTE — TELEPHONE ENCOUNTER
Pt is following up on a fax that was sent 1/12 to Dr. Grullon office for Home care services. Pt would like to know if fax was received and if  was able to fill out the paper work. Please can someone reach out?

## 2023-01-20 NOTE — TELEPHONE ENCOUNTER
Spoke with patient, stating she faxed over a form on 01/12/2023. Made patient aware we do not have form as of 01/20/2022. Patient will bring form to the office.

## 2023-01-25 ENCOUNTER — TELEPHONE (OUTPATIENT)
Dept: FAMILY MEDICINE | Facility: CLINIC | Age: 58
End: 2023-01-25

## 2023-01-25 DIAGNOSIS — K21.9 GASTROESOPHAGEAL REFLUX DISEASE WITHOUT ESOPHAGITIS: ICD-10-CM

## 2023-01-25 RX ORDER — OMEPRAZOLE 20 MG/1
20 CAPSULE, DELAYED RELEASE ORAL
Qty: 90 CAPSULE | Refills: 1 | Status: SHIPPED | OUTPATIENT
Start: 2023-01-25 | End: 2023-06-20 | Stop reason: SDUPTHER

## 2023-02-23 DIAGNOSIS — I10 ESSENTIAL HYPERTENSION: ICD-10-CM

## 2023-02-23 RX ORDER — LOSARTAN POTASSIUM AND HYDROCHLOROTHIAZIDE 25; 100 MG/1; MG/1
1 TABLET ORAL DAILY
Qty: 30 TABLET | Refills: 1 | Status: SHIPPED | OUTPATIENT
Start: 2023-02-23 | End: 2023-04-21

## 2023-03-20 ENCOUNTER — TELEPHONE (OUTPATIENT)
Dept: FAMILY MEDICINE | Facility: CLINIC | Age: 58
End: 2023-03-20

## 2023-03-20 NOTE — TELEPHONE ENCOUNTER
Request for Medical Advice (NON-URGENT)   Patient PCP: Prosper Grullon MD  New or Existing Issue: Existing  Question or Concern: Pt has had ongoing stomach issues for years. Has had a GI workup, twice. Most recent Colonoscopy by Dr. Frantz Taylor on 12/13/22 confirmed diverticulosis, but no active inflammation at the time of the procedure. Pt has changed her diet, but continues to have episodes of diarrhea 4-5x/wk. She continues to have stomach pains after meals, and sometimes has urgency for the bathroom after meals. She wants to know what else she can do or try to help her sx?  Preferred Pharmacy:   [facility] - [address] [address]  Phone: [phone] Fax: [phone]      The practice will reach out to discuss your Medical Question or Concern within 2 business days.

## 2023-03-20 NOTE — TELEPHONE ENCOUNTER
She should follow up again with GI.  Reading over the last consult prior to her colonoscopy, it mentions investigating further with possible CT scan if symptoms persisted and no cause found on colonoscopy.

## 2023-03-23 ENCOUNTER — APPOINTMENT (OUTPATIENT)
Dept: RADIOLOGY | Age: 58
End: 2023-03-23

## 2023-04-21 DIAGNOSIS — I10 ESSENTIAL HYPERTENSION: ICD-10-CM

## 2023-04-21 RX ORDER — LOSARTAN POTASSIUM AND HYDROCHLOROTHIAZIDE 25; 100 MG/1; MG/1
1 TABLET ORAL DAILY
Qty: 30 TABLET | Refills: 0 | Status: SHIPPED | OUTPATIENT
Start: 2023-04-21 | End: 2023-06-20

## 2023-05-01 ENCOUNTER — TELEPHONE (OUTPATIENT)
Dept: FAMILY MEDICINE | Facility: CLINIC | Age: 58
End: 2023-05-01

## 2023-05-01 NOTE — TELEPHONE ENCOUNTER
Patient called to speak with Dr Grullon. She states that she needs to talk about her bp medication. She has been having diarrhea and frequent urination  throughout the day and all night. Please give the patient a callback to advise.

## 2023-05-01 NOTE — TELEPHONE ENCOUNTER
Reviewed recommendations with patient.  She was informed that meds are not a diuretic and should not cause increased urination.  She will call Gi to review

## 2023-05-30 DIAGNOSIS — E78.00 HYPERCHOLESTEROLEMIA: ICD-10-CM

## 2023-05-30 RX ORDER — ATORVASTATIN CALCIUM 10 MG/1
TABLET, FILM COATED ORAL
Qty: 90 TABLET | Refills: 1 | Status: SHIPPED | OUTPATIENT
Start: 2023-05-30 | End: 2023-06-20 | Stop reason: SDUPTHER

## 2023-06-20 ENCOUNTER — TELEPHONE (OUTPATIENT)
Dept: FAMILY MEDICINE | Facility: CLINIC | Age: 58
End: 2023-06-20
Payer: COMMERCIAL

## 2023-06-20 DIAGNOSIS — K21.9 GASTROESOPHAGEAL REFLUX DISEASE WITHOUT ESOPHAGITIS: ICD-10-CM

## 2023-06-20 DIAGNOSIS — I10 ESSENTIAL HYPERTENSION: ICD-10-CM

## 2023-06-20 DIAGNOSIS — E78.00 HYPERCHOLESTEROLEMIA: ICD-10-CM

## 2023-06-20 RX ORDER — LOSARTAN POTASSIUM AND HYDROCHLOROTHIAZIDE 25; 100 MG/1; MG/1
1 TABLET ORAL DAILY
Qty: 30 TABLET | Refills: 0 | Status: SHIPPED | OUTPATIENT
Start: 2023-06-20 | End: 2023-06-20 | Stop reason: SDUPTHER

## 2023-06-20 NOTE — TELEPHONE ENCOUNTER
Pt is requesting a call. Pt would like to discuss medication and which medication is needed for refill. Please can someone reach out and advise?

## 2023-06-20 NOTE — TELEPHONE ENCOUNTER
Patient requesting refills.  How many refills should I send and Is she due for a follow up  Last visit December

## 2023-06-22 RX ORDER — OMEPRAZOLE 20 MG/1
20 CAPSULE, DELAYED RELEASE ORAL
Qty: 30 CAPSULE | Refills: 1 | Status: SHIPPED | OUTPATIENT
Start: 2023-06-22 | End: 2023-07-18

## 2023-06-22 RX ORDER — ATORVASTATIN CALCIUM 10 MG/1
10 TABLET, FILM COATED ORAL NIGHTLY
Qty: 30 TABLET | Refills: 1 | Status: SHIPPED | OUTPATIENT
Start: 2023-06-22 | End: 2023-08-17 | Stop reason: SDUPTHER

## 2023-06-22 RX ORDER — LOSARTAN POTASSIUM AND HYDROCHLOROTHIAZIDE 25; 100 MG/1; MG/1
1 TABLET ORAL DAILY
Qty: 30 TABLET | Refills: 1 | Status: SHIPPED | OUTPATIENT
Start: 2023-06-22 | End: 2023-08-17

## 2023-07-18 DIAGNOSIS — K21.9 GASTROESOPHAGEAL REFLUX DISEASE WITHOUT ESOPHAGITIS: ICD-10-CM

## 2023-07-18 RX ORDER — OMEPRAZOLE 20 MG/1
20 CAPSULE, DELAYED RELEASE ORAL
Qty: 90 CAPSULE | Refills: 1 | Status: SHIPPED | OUTPATIENT
Start: 2023-07-18 | End: 2023-11-10 | Stop reason: SDUPTHER

## 2023-07-26 ENCOUNTER — TRANSCRIBE ORDERS (OUTPATIENT)
Dept: SCHEDULING | Age: 58
End: 2023-07-26

## 2023-07-26 DIAGNOSIS — M54.50 LOW BACK PAIN, UNSPECIFIED: Primary | ICD-10-CM

## 2023-07-26 DIAGNOSIS — M54.2 CERVICALGIA: ICD-10-CM

## 2023-08-11 ENCOUNTER — HOSPITAL ENCOUNTER (OUTPATIENT)
Dept: RADIOLOGY | Facility: HOSPITAL | Age: 58
Discharge: HOME | End: 2023-08-11
Attending: ORTHOPAEDIC SURGERY
Payer: COMMERCIAL

## 2023-08-11 VITALS — WEIGHT: 293 LBS | BODY MASS INDEX: 45.61 KG/M2

## 2023-08-11 DIAGNOSIS — M54.50 LOW BACK PAIN, UNSPECIFIED: ICD-10-CM

## 2023-08-11 DIAGNOSIS — M54.2 CERVICALGIA: ICD-10-CM

## 2023-08-17 ENCOUNTER — OFFICE VISIT (OUTPATIENT)
Dept: FAMILY MEDICINE | Facility: CLINIC | Age: 58
End: 2023-08-17
Payer: COMMERCIAL

## 2023-08-17 VITALS
TEMPERATURE: 98.2 F | RESPIRATION RATE: 16 BRPM | OXYGEN SATURATION: 96 % | BODY MASS INDEX: 44.41 KG/M2 | SYSTOLIC BLOOD PRESSURE: 115 MMHG | HEART RATE: 90 BPM | WEIGHT: 293 LBS | HEIGHT: 68 IN | DIASTOLIC BLOOD PRESSURE: 70 MMHG

## 2023-08-17 DIAGNOSIS — F20.9 SCHIZOPHRENIA, UNSPECIFIED TYPE: ICD-10-CM

## 2023-08-17 DIAGNOSIS — N32.81 OVERACTIVE BLADDER: ICD-10-CM

## 2023-08-17 DIAGNOSIS — F31.10 BIPOLAR AFFECTIVE DISORDER, CURRENT EPISODE MANIC, CURRENT EPISODE SEVERITY UNSPECIFIED (CMS/HCC): ICD-10-CM

## 2023-08-17 DIAGNOSIS — Z00.00 ANNUAL PHYSICAL EXAM: Primary | ICD-10-CM

## 2023-08-17 DIAGNOSIS — F43.10 PTSD (POST-TRAUMATIC STRESS DISORDER): ICD-10-CM

## 2023-08-17 DIAGNOSIS — E55.9 VITAMIN D DEFICIENCY: ICD-10-CM

## 2023-08-17 DIAGNOSIS — R73.03 PREDIABETES: ICD-10-CM

## 2023-08-17 DIAGNOSIS — G95.89 MASS OF SPINAL CORD (CMS/HCC): ICD-10-CM

## 2023-08-17 DIAGNOSIS — K52.9 CHRONIC DIARRHEA: ICD-10-CM

## 2023-08-17 DIAGNOSIS — E78.00 HYPERCHOLESTEROLEMIA: ICD-10-CM

## 2023-08-17 DIAGNOSIS — I10 ESSENTIAL HYPERTENSION: ICD-10-CM

## 2023-08-17 PROCEDURE — 3008F BODY MASS INDEX DOCD: CPT | Performed by: FAMILY MEDICINE

## 2023-08-17 PROCEDURE — 3074F SYST BP LT 130 MM HG: CPT | Performed by: FAMILY MEDICINE

## 2023-08-17 PROCEDURE — 99396 PREV VISIT EST AGE 40-64: CPT | Performed by: FAMILY MEDICINE

## 2023-08-17 PROCEDURE — 3078F DIAST BP <80 MM HG: CPT | Performed by: FAMILY MEDICINE

## 2023-08-17 RX ORDER — LOSARTAN POTASSIUM 100 MG/1
100 TABLET ORAL DAILY
Qty: 90 TABLET | Refills: 1 | Status: SHIPPED | OUTPATIENT
Start: 2023-08-17 | End: 2024-01-24

## 2023-08-17 RX ORDER — MIRABEGRON 50 MG/1
TABLET, FILM COATED, EXTENDED RELEASE ORAL
COMMUNITY
Start: 2023-08-09 | End: 2024-02-14 | Stop reason: SDUPTHER

## 2023-08-17 RX ORDER — ATORVASTATIN CALCIUM 10 MG/1
10 TABLET, FILM COATED ORAL NIGHTLY
Qty: 30 TABLET | Refills: 1
Start: 2023-08-17 | End: 2023-11-10 | Stop reason: SDUPTHER

## 2023-08-17 ASSESSMENT — ENCOUNTER SYMPTOMS
DIARRHEA: 1
DIZZINESS: 0
TROUBLE SWALLOWING: 0
VOMITING: 0
BACK PAIN: 1
SHORTNESS OF BREATH: 0
ABDOMINAL PAIN: 0
LIGHT-HEADEDNESS: 0
PALPITATIONS: 0
FREQUENCY: 1
NAUSEA: 0
FATIGUE: 0
ABDOMINAL DISTENTION: 1
CONSTIPATION: 0
FEVER: 0
COUGH: 0

## 2023-08-17 ASSESSMENT — PATIENT HEALTH QUESTIONNAIRE - PHQ9
SUM OF ALL RESPONSES TO PHQ9 QUESTIONS 1 & 2: 4
SUM OF ALL RESPONSES TO PHQ QUESTIONS 1-9: 22

## 2023-08-17 NOTE — ASSESSMENT & PLAN NOTE
Due to increased urination, we will discontinue the HCTZ at this time.  Continue on losartan.  She will follow up in 4-6 weeks to reassess.

## 2023-08-17 NOTE — ASSESSMENT & PLAN NOTE
Continue follow up with Urology.  Continue with Myrbetriq.  We will discontinue the HCTZ to see if it helps further improves symptoms.

## 2023-08-17 NOTE — PROGRESS NOTES
Daily Progress Note       Patient ID: Kar Nicholas is a 58 y.o. female.    HPI    58 year old female presents for annual physical exam.      Hx of Schizophrenia, Bipolar - Manic, PTSD and Anxiety - has been following up with Psychiatry.  Currently on Wellbutrin XL, Cymbalta, Seroquel and Prazosin.  She has been having suicidal thoughts but she currently has no plans on acting on them.  She is scheduled with Psychiatry next week.      Essential Hypertension - currently on losartan-HCTZ 100-25 mg daily.      Hypercholesterolemia - currently on atorvastatin 10 mg qhs - tolerating with no complaints.      GERD - currently on omeprazole 20 mg daily.  Attempted to switch to H2 blocker but had worsening of symptoms.      Vitamin D Deficiency     OAB - following up with Urology.  Currently on Myrbetriq 50 mg daily.      Following up with ENT, Dr. Vladimir Toussaint.  Due to chronic nasal obstruction she is scheduled for septoplasty and turbinectomy on 9/13/2023.    Recently had MRI of the cervical spine and lumbar spine done on May 11, 2023.  Cervical spine showed degenerative disc disease.  MRI of the lumbar spine showed 5 mm mass in the right aspect of the thecal sac at L1, this may represent schwannoma/neurofibroma.  Follow-up MRI with IV contrast was recommended.    She continues to have symptoms of chronic diarrhea.  Had a colonoscopy on 12/13/2022 which showed no cause.    History of substance abuse     Colonoscopy-12/13/2022, repeat in 5 years    Mammogram - 10/13/2022    The following have been reviewed and updated as appropriate in this visit:   Tobacco  Allergies  Meds  Problems  Med Hx  Surg Hx  Fam Hx       Review of Systems   Constitutional: Negative for fatigue and fever.   HENT: Negative for trouble swallowing.    Respiratory: Negative for cough and shortness of breath.    Cardiovascular: Negative for chest pain and palpitations.   Gastrointestinal: Positive for abdominal distention and diarrhea.  Negative for abdominal pain, constipation, nausea and vomiting.   Genitourinary: Positive for frequency.   Musculoskeletal: Positive for back pain and gait problem.   Neurological: Negative for dizziness, syncope and light-headedness.   All other systems reviewed and are negative.            Current Outpatient Medications   Medication Sig Dispense Refill   • atorvastatin (LIPITOR) 10 mg tablet Take 1 tablet (10 mg total) by mouth nightly. 30 tablet 1   • buPROPion XL (WELLBUTRIN XL) 150 mg 24 hr tablet 1 tablet (150 mg total) daily.     • DULoxetine (CYMBALTA) 30 mg capsule 1 capsule (30 mg total) daily.     • DULoxetine (CYMBALTA) 60 mg capsule 1 capsule (60 mg total) daily.     • losartan (COZAAR) 100 mg tablet Take 1 tablet (100 mg total) by mouth daily. 90 tablet 1   • MYRBETRIQ 50 mg ER tablet      • nalTREXone (DEPADE) 50 mg tablet 1 tablet (50 mg total) daily.     • omeprazole (PriLOSEC) 20 mg capsule TAKE 1 CAPSULE (20 MG TOTAL) BY MOUTH DAILY BEFORE BREAKFAST. 90 capsule 1   • prazosin (MINIPRESS) 2 mg capsule Take 2 mg by mouth nightly.     • QUEtiapine (SEROquel) 300 mg tablet 1 tablet (300 mg total) nightly.       No current facility-administered medications for this visit.     Past Medical History:   Diagnosis Date   • Alcoholism (CMS/[facility])     In Recovery  Since October 2016    • Anxiety    • Asthma     Remote   • Bipolar affect, depressed (CMS/[facility])    • Body mass index (BMI) 45.0-49.9, adult    • Borderline type 2 diabetes mellitus 4/15/2019   • Cocaine addiction (CMS/[facility])      None since November 2016   • Delayed emergence from general anesthesia    • Depression    • GERD (gastroesophageal reflux disease)    • History of snoring    • Hypertension    • Joint pain    • Lipid disorder    • Migraines    • Morbid obesity with BMI of 45.0-49.9, adult (CMS/[facility])    • Osteoarthritis     Knees   • Post-menopause    • Pre-diabetes    • Schizophrenia (CMS/[facility])    • Sciatica    • Sleep apnea     Uses CPAP occas-  "To bring CPAP Day of Sx   • Urinary incontinence      Family History   Problem Relation Age of Onset   • Colon cancer Biological Mother    • Lung cancer Biological Father    • Heart failure Biological Brother    • Heart block Biological Daughter    • Breast cancer Other    • Breast cancer Cousin      Past Surgical History:   Procedure Laterality Date   • CHOLECYSTECTOMY  2018   • COLONOSCOPY     • HYSTERECTOMY  2010    PARTIAL   • JOINT REPLACEMENT      right knee   • KNEE SURGERY Right    • REDUCTION MAMMAPLASTY Bilateral    • WISDOM TOOTH EXTRACTION       Social History     Tobacco Use   • Smoking status: Some Days     Packs/day: 0.25     Years: 30.00     Pack years: 7.50     Types: Cigarettes   • Smokeless tobacco: Never   • Tobacco comments:     3 cigarettes a week   Vaping Use   • Vaping Use: Never used   Substance Use Topics   • Alcohol use: Not Currently     Comment: Recovering Alcoholic since 10/2016   • Drug use: No     Types: Cocaine     Comment: None since 11/2016     Allergies   Allergen Reactions   • Shellfish Containing Products Angioedema     Other reaction(s): shellfish       Objective   No new labs.    Vitals:    08/17/23 1318   BP: 115/70   BP Location: Left upper arm   Patient Position: Sitting   Pulse: 90   Resp: 16   Temp: 36.8 °C (98.2 °F)   TempSrc: Temporal   SpO2: 96%   Weight: 134 kg (296 lb)   Height: 1.727 m (5' 8\")         Physical Exam  Vitals and nursing note reviewed.   Constitutional:       Appearance: She is well-developed.   HENT:      Head: Normocephalic and atraumatic.      Right Ear: Tympanic membrane normal.      Left Ear: Tympanic membrane normal.   Eyes:      Extraocular Movements: Extraocular movements intact.      Conjunctiva/sclera: Conjunctivae normal.      Pupils: Pupils are equal, round, and reactive to light.   Cardiovascular:      Rate and Rhythm: Normal rate and regular rhythm.      Heart sounds: Normal heart sounds.   Pulmonary:      Effort: Pulmonary effort is " normal. No respiratory distress.      Breath sounds: Normal breath sounds.   Abdominal:      General: Bowel sounds are normal. There is no distension.      Palpations: Abdomen is soft.      Tenderness: There is no abdominal tenderness.   Musculoskeletal:         General: Normal range of motion.      Cervical back: Normal range of motion and neck supple.   Skin:     General: Skin is warm and dry.   Neurological:      Mental Status: She is alert and oriented to person, place, and time.         Assessment/Plan   Problem List Items Addressed This Visit        Circulatory    Essential hypertension    Current Assessment & Plan     Due to increased urination, we will discontinue the HCTZ at this time.  Continue on losartan.  She will follow up in 4-6 weeks to reassess.         Relevant Medications    losartan (COZAAR) 100 mg tablet    Other Relevant Orders    CBC and Differential    Comprehensive metabolic panel    TSH w reflex FT4    Urinalysis with microscopic    Microalbumin/Creatinine Ur Random       Digestive    Chronic diarrhea    Current Assessment & Plan     Will refer to GI at this time for further evaluation and management          Relevant Orders    Ambulatory referral to Gastroenterology       Genitourinary    Overactive bladder    Current Assessment & Plan     Continue follow up with Urology.  Continue with Myrbetriq.  We will discontinue the HCTZ to see if it helps further improves symptoms.           Relevant Medications    MYRBETRIQ 50 mg ER tablet       Endocrine/Metabolic    Prediabetes    Current Assessment & Plan     Recommend low carb diet, moderate exercise and weight loss. Decrease intake of bread, rice, pasta, starches, juice, soda and sweets.         Relevant Orders    Comprehensive metabolic panel    Hemoglobin A1c    Microalbumin/Creatinine Ur Random    Vitamin D deficiency    Relevant Orders    Vitamin D 25 hydroxy       Mental Health    Bipolar affective disorder, current episode manic  (CMS/HCC)    Current Assessment & Plan     Continue follow up with Psychiatry.  She promises she will discuss her depression symptoms with her psychiatrist next week.          PTSD (post-traumatic stress disorder)    Current Assessment & Plan     Continue follow up with Psychiatry.  She promises she will discuss her depression symptoms with her psychiatrist next week.          Schizophrenia (CMS/HCC)    Current Assessment & Plan     Continue follow up with Psychiatry.             Other    Hypercholesterolemia    Current Assessment & Plan     Continue on statin therapy         Relevant Medications    atorvastatin (LIPITOR) 10 mg tablet    Other Relevant Orders    Comprehensive metabolic panel    Lipid panel    TSH w reflex FT4    Annual physical exam - Primary    Current Assessment & Plan     Recommend well balanced diet with plenty of fruits and vegetables, whole grains, lean proteins and low fat dairy. Recommend an exercise program - goal should be at least 150 minutes/week of moderate exercise.          Relevant Orders    CBC and Differential    Comprehensive metabolic panel    Hemoglobin A1c    Lipid panel    TSH w reflex FT4    Urinalysis with microscopic    Microalbumin/Creatinine Ur Random    Vitamin D 25 hydroxy    Mass of spinal cord (CMS/HCC)    Current Assessment & Plan     MRI of the lumbar spine showed 5 mm mass in the right aspect of the thecal sac at L1, this may represent schwannoma/neurofibroma.  Follow-up MRI with IV contrast was ordered for further evaluation.             Prosper Grullon MD  8/17/2023

## 2023-08-17 NOTE — ASSESSMENT & PLAN NOTE
Continue follow up with Psychiatry.  She promises she will discuss her depression symptoms with her psychiatrist next week.

## 2023-08-17 NOTE — ASSESSMENT & PLAN NOTE
MRI of the lumbar spine showed 5 mm mass in the right aspect of the thecal sac at L1, this may represent schwannoma/neurofibroma.  Follow-up MRI with IV contrast was ordered for further evaluation.

## 2023-08-21 ENCOUNTER — TRANSCRIBE ORDERS (OUTPATIENT)
Dept: SCHEDULING | Age: 58
End: 2023-08-21

## 2023-08-21 ENCOUNTER — TELEPHONE (OUTPATIENT)
Dept: FAMILY MEDICINE | Facility: CLINIC | Age: 58
End: 2023-08-21

## 2023-08-21 DIAGNOSIS — M54.50 LOW BACK PAIN, UNSPECIFIED: Primary | ICD-10-CM

## 2023-08-21 DIAGNOSIS — M54.2 CERVICALGIA: ICD-10-CM

## 2023-08-21 DIAGNOSIS — D33.4 BENIGN NEOPLASM OF SPINAL CORD (CMS/HCC): Primary | ICD-10-CM

## 2023-08-21 NOTE — TELEPHONE ENCOUNTER
Patient called to speak with Dr Grullon. She wants to know if he could give her a callback to discuss a a script in her chart that she states bere is unable to locate for an Mri with contrast. Please give the patient a callback to discuss.

## 2023-08-31 ENCOUNTER — HOSPITAL ENCOUNTER (OUTPATIENT)
Dept: RADIOLOGY | Facility: HOSPITAL | Age: 58
Discharge: HOME | End: 2023-08-31
Attending: ORTHOPAEDIC SURGERY
Payer: COMMERCIAL

## 2023-08-31 DIAGNOSIS — D33.4 BENIGN NEOPLASM OF SPINAL CORD (CMS/HCC): ICD-10-CM

## 2023-08-31 RX ORDER — GADOBUTROL 604.72 MG/ML
13.4 INJECTION INTRAVENOUS ONCE
Status: COMPLETED | OUTPATIENT
Start: 2023-08-31 | End: 2023-08-31

## 2023-08-31 RX ADMIN — GADOBUTROL 13.4 ML: 604.72 INJECTION INTRAVENOUS at 13:06

## 2023-09-12 LAB
25(OH)D3 SERPL-MCNC: 17 NG/ML (ref 30–100)
ALBUMIN SERPL-MCNC: 4.3 G/DL (ref 3.6–5.1)
ALBUMIN/CREAT UR: 3 MCG/MG CREAT
ALBUMIN/GLOB SERPL: 1.7 (CALC) (ref 1–2.5)
ALP SERPL-CCNC: 71 U/L (ref 37–153)
ALT SERPL-CCNC: 10 U/L (ref 6–29)
APPEARANCE UR: CLEAR
AST SERPL-CCNC: 12 U/L (ref 10–35)
BACTERIA #/AREA URNS HPF: ABNORMAL /HPF
BASOPHILS # BLD AUTO: 83 CELLS/UL (ref 0–200)
BASOPHILS NFR BLD AUTO: 1.4 %
BILIRUB SERPL-MCNC: 0.4 MG/DL (ref 0.2–1.2)
BILIRUB UR QL STRIP: NEGATIVE
BUN SERPL-MCNC: 16 MG/DL (ref 7–25)
BUN/CREAT SERPL: 15 (CALC) (ref 6–22)
CALCIUM SERPL-MCNC: 9.6 MG/DL (ref 8.6–10.4)
CHLORIDE SERPL-SCNC: 107 MMOL/L (ref 98–110)
CHOLEST SERPL-MCNC: 193 MG/DL
CHOLEST/HDLC SERPL: 2.7 (CALC)
CO2 SERPL-SCNC: 27 MMOL/L (ref 20–32)
COLOR UR: YELLOW
CREAT SERPL-MCNC: 1.07 MG/DL (ref 0.5–1.03)
CREAT UR-MCNC: 179 MG/DL (ref 20–275)
EGFRCR SERPLBLD CKD-EPI 2021: 61 ML/MIN/1.73M2
EOSINOPHIL # BLD AUTO: 100 CELLS/UL (ref 15–500)
EOSINOPHIL NFR BLD AUTO: 1.7 %
ERYTHROCYTE [DISTWIDTH] IN BLOOD BY AUTOMATED COUNT: 13.7 % (ref 11–15)
GLOBULIN SER CALC-MCNC: 2.6 G/DL (CALC) (ref 1.9–3.7)
GLUCOSE SERPL-MCNC: 92 MG/DL (ref 65–99)
GLUCOSE UR QL STRIP: NEGATIVE
HBA1C MFR BLD: 5.8 % OF TOTAL HGB
HCT VFR BLD AUTO: 37.6 % (ref 35–45)
HDLC SERPL-MCNC: 71 MG/DL
HGB BLD-MCNC: 12.3 G/DL (ref 11.7–15.5)
HGB UR QL STRIP: NEGATIVE
HYALINE CASTS #/AREA URNS LPF: ABNORMAL /LPF
KETONES UR QL STRIP: NEGATIVE
LDLC SERPL CALC-MCNC: 98 MG/DL (CALC)
LEUKOCYTE ESTERASE UR QL STRIP: ABNORMAL
LYMPHOCYTES # BLD AUTO: 1457 CELLS/UL (ref 850–3900)
LYMPHOCYTES NFR BLD AUTO: 24.7 %
MCH RBC QN AUTO: 27.2 PG (ref 27–33)
MCHC RBC AUTO-ENTMCNC: 32.7 G/DL (ref 32–36)
MCV RBC AUTO: 83 FL (ref 80–100)
MICROALBUMIN UR-MCNC: 0.5 MG/DL
MONOCYTES # BLD AUTO: 561 CELLS/UL (ref 200–950)
MONOCYTES NFR BLD AUTO: 9.5 %
NEUTROPHILS # BLD AUTO: 3699 CELLS/UL (ref 1500–7800)
NEUTROPHILS NFR BLD AUTO: 62.7 %
NITRITE UR QL STRIP: NEGATIVE
NONHDLC SERPL-MCNC: 122 MG/DL (CALC)
PH UR STRIP: 5.5 [PH] (ref 5–8)
PLATELET # BLD AUTO: 279 THOUSAND/UL (ref 140–400)
PMV BLD REES-ECKER: 11.6 FL (ref 7.5–12.5)
POTASSIUM SERPL-SCNC: 4.4 MMOL/L (ref 3.5–5.3)
PROT SERPL-MCNC: 6.9 G/DL (ref 6.1–8.1)
PROT UR QL STRIP: NEGATIVE
RBC # BLD AUTO: 4.53 MILLION/UL (ref 3.8–5.1)
RBC #/AREA URNS HPF: ABNORMAL /HPF
SERVICE CMNT-IMP: ABNORMAL
SODIUM SERPL-SCNC: 142 MMOL/L (ref 135–146)
SP GR UR STRIP: 1.02 (ref 1–1.03)
SQUAMOUS #/AREA URNS HPF: ABNORMAL /HPF
TRIGL SERPL-MCNC: 142 MG/DL
TSH SERPL-ACNC: 2.07 MIU/L (ref 0.4–4.5)
WBC # BLD AUTO: 5.9 THOUSAND/UL (ref 3.8–10.8)
WBC #/AREA URNS HPF: ABNORMAL /HPF

## 2023-09-15 ENCOUNTER — TELEPHONE (OUTPATIENT)
Dept: FAMILY MEDICINE | Facility: CLINIC | Age: 58
End: 2023-09-15

## 2023-09-15 NOTE — TELEPHONE ENCOUNTER
Called the number given.  Nurse answered the phone.  Requested Kar and phone not transferred.  She states she can have me discuss with Dr Sandoval but she has currently left for the day.  Advised I would call back

## 2023-09-15 NOTE — TELEPHONE ENCOUNTER
"Kar called because she is currently in [facility] in the \"physch ceja\". She would like advice on if she should get the shock wave therapy they are recommending for her. She is unsure exactly what is being offered but asked that she get a call back to get advice. Please call [phone]  And the doctor name is Dr. Sandoval.   "

## 2023-10-02 ENCOUNTER — PATIENT OUTREACH (OUTPATIENT)
Dept: CASE MANAGEMENT | Facility: CLINIC | Age: 58
End: 2023-10-02
Payer: COMMERCIAL

## 2023-10-02 NOTE — PROGRESS NOTES
STEFAN outreach #1, no answer. VM left with RN contact information for return call back.  Another outreach attempt will be made at the next business day.    Priscila Welsh, RN  Nurse Ambulatory Care Manager  [phone]

## 2023-10-03 NOTE — PROGRESS NOTES
NAME: Kar Nicholas    MRN: [id number]    YOB: 1965    Event Review:    Initial TCM Patient Outreach Date: 10/02/23    Discharge Diagnosis: Acute chest pain; cocaine abuse    Patient readmitted in the last 30 days: No  Discharging Facility: [facility]  Date of Last Admission: 09/27/23  Date of Last Discharge: 09/29/23    Interventions/ Care Coordination:  Two outreach calls were made within 1-2 days of discharge. Unable to reach patient.     If patient is seen in the office within 14 calendar days, you may bill for a TCM visit.     RN phone number left for future care management needs.    Priscila Welsh, KEERTHI  [phone]

## 2023-10-03 NOTE — PROGRESS NOTES
STEFAN outreach #2, no answer. VM left with RN contact information for return call back.    Priscila Welsh, RN  Nurse Ambulatory Care Manager  [phone]

## 2023-10-11 ENCOUNTER — TELEPHONE (OUTPATIENT)
Dept: FAMILY MEDICINE | Facility: CLINIC | Age: 58
End: 2023-10-11
Payer: COMMERCIAL

## 2023-10-11 NOTE — TELEPHONE ENCOUNTER
[facility] Appointment Request   Provider: Prosper Grullon  Appointment Type: TCM  Reason for Visit: Discharged from [facility] 9/29 diagnosed with Acute chest pain; cocaine abuse.  Available Day and Time: asap  Best Contact Number: [phone]    The practice will reach out to schedule your appointment within the next 2 business days.

## 2023-10-12 NOTE — TELEPHONE ENCOUNTER
Patient has been scheduled with Dr. Grullon for 10/17/23 at 11 am - 40 minute visit, per doctor request.    Luli PATEL

## 2023-10-17 ENCOUNTER — OFFICE VISIT (OUTPATIENT)
Dept: FAMILY MEDICINE | Facility: CLINIC | Age: 58
End: 2023-10-17
Payer: COMMERCIAL

## 2023-10-17 ENCOUNTER — TELEPHONE (OUTPATIENT)
Dept: NEUROSURGERY | Facility: CLINIC | Age: 58
End: 2023-10-17
Payer: COMMERCIAL

## 2023-10-17 VITALS
HEIGHT: 68 IN | DIASTOLIC BLOOD PRESSURE: 70 MMHG | OXYGEN SATURATION: 98 % | BODY MASS INDEX: 44.41 KG/M2 | TEMPERATURE: 98.2 F | HEART RATE: 75 BPM | RESPIRATION RATE: 16 BRPM | SYSTOLIC BLOOD PRESSURE: 120 MMHG | WEIGHT: 293 LBS

## 2023-10-17 DIAGNOSIS — F20.9 SCHIZOPHRENIA, UNSPECIFIED TYPE: ICD-10-CM

## 2023-10-17 DIAGNOSIS — R73.03 PREDIABETES: ICD-10-CM

## 2023-10-17 DIAGNOSIS — F43.10 PTSD (POST-TRAUMATIC STRESS DISORDER): ICD-10-CM

## 2023-10-17 DIAGNOSIS — F33.9 EPISODE OF RECURRENT MAJOR DEPRESSIVE DISORDER, UNSPECIFIED DEPRESSION EPISODE SEVERITY (CMS/HCC): ICD-10-CM

## 2023-10-17 DIAGNOSIS — G95.89 MASS OF SPINAL CORD (CMS/HCC): Primary | ICD-10-CM

## 2023-10-17 DIAGNOSIS — F41.9 ANXIETY: ICD-10-CM

## 2023-10-17 DIAGNOSIS — F19.11 HISTORY OF SUBSTANCE ABUSE (CMS/HCC): ICD-10-CM

## 2023-10-17 DIAGNOSIS — F31.10 BIPOLAR AFFECTIVE DISORDER, CURRENT EPISODE MANIC, CURRENT EPISODE SEVERITY UNSPECIFIED (CMS/HCC): ICD-10-CM

## 2023-10-17 PROCEDURE — 99214 OFFICE O/P EST MOD 30 MIN: CPT | Performed by: FAMILY MEDICINE

## 2023-10-17 PROCEDURE — 3074F SYST BP LT 130 MM HG: CPT | Performed by: FAMILY MEDICINE

## 2023-10-17 PROCEDURE — 3008F BODY MASS INDEX DOCD: CPT | Performed by: FAMILY MEDICINE

## 2023-10-17 PROCEDURE — 3078F DIAST BP <80 MM HG: CPT | Performed by: FAMILY MEDICINE

## 2023-10-17 RX ORDER — BUPROPION HYDROCHLORIDE 150 MG/1
150 TABLET ORAL DAILY
Start: 2023-10-17 | End: 2024-02-14 | Stop reason: SDUPTHER

## 2023-10-17 RX ORDER — HYDRALAZINE HYDROCHLORIDE 50 MG/1
TABLET, FILM COATED ORAL
COMMUNITY
Start: 2023-10-03 | End: 2024-08-14

## 2023-10-17 RX ORDER — PRAZOSIN HYDROCHLORIDE 2 MG/1
2 CAPSULE ORAL NIGHTLY
Start: 2023-10-17 | End: 2024-02-14

## 2023-10-17 RX ORDER — DULOXETIN HYDROCHLORIDE 60 MG/1
60 CAPSULE, DELAYED RELEASE ORAL DAILY
Start: 2023-10-17 | End: 2024-02-14 | Stop reason: SDUPTHER

## 2023-10-17 RX ORDER — CLONIDINE HYDROCHLORIDE 0.1 MG/1
TABLET ORAL
COMMUNITY
Start: 2023-10-12 | End: 2024-08-14 | Stop reason: SDUPTHER

## 2023-10-17 RX ORDER — PANTOPRAZOLE SODIUM 40 MG/1
TABLET, DELAYED RELEASE ORAL
COMMUNITY
Start: 2023-10-02 | End: 2024-02-14

## 2023-10-17 RX ORDER — QUETIAPINE FUMARATE 400 MG/1
400 TABLET, FILM COATED ORAL NIGHTLY
Start: 2023-10-17 | End: 2024-02-14 | Stop reason: SDUPTHER

## 2023-10-17 RX ORDER — DULOXETIN HYDROCHLORIDE 30 MG/1
30 CAPSULE, DELAYED RELEASE ORAL DAILY
Start: 2023-10-17 | End: 2024-02-14 | Stop reason: SDUPTHER

## 2023-10-17 ASSESSMENT — ENCOUNTER SYMPTOMS: CONSTITUTIONAL NEGATIVE: 1

## 2023-10-17 NOTE — PROGRESS NOTES
Daily Progress Note       Patient ID: Kar Nicholas is a 58 y.o. female.    HPI     58-year-old female presents for hospital follow-up.    Recently admitted from 9/27/2023 through 9/29/2023 at [facility].  Presented with acute chest pain after cocaine use.  Cardiac work-up was negative.      Hx of Schizophrenia, Bipolar - Manic, PTSD and Anxiety - has been following up with Psychiatry.  Currently on Wellbutrin XL, Cymbalta, Seroquel and Prazosin.  Most recently her Seroquel was increased to 400 mg.  She is status post electroconvulsive therapy on 9/18/2023.  Feels like she is doing better at this time.  Feels motivated.  Has not used since hospitalization.        The following have been reviewed and updated as appropriate in this visit:   Allergies  Meds  Problems       Review of Systems   Constitutional: Negative.              Current Outpatient Medications   Medication Sig Dispense Refill    atorvastatin (LIPITOR) 10 mg tablet Take 1 tablet (10 mg total) by mouth nightly. 30 tablet 1    buPROPion XL (WELLBUTRIN XL) 150 mg 24 hr tablet Take 1 tablet (150 mg total) by mouth daily.      DULoxetine (CYMBALTA) 30 mg capsule Take 1 capsule (30 mg total) by mouth daily.      DULoxetine (CYMBALTA) 60 mg capsule Take 1 capsule (60 mg total) by mouth daily.      losartan (COZAAR) 100 mg tablet Take 1 tablet (100 mg total) by mouth daily. 90 tablet 1    MYRBETRIQ 50 mg ER tablet       nalTREXone (DEPADE) 50 mg tablet 1 tablet (50 mg total) daily.      omeprazole (PriLOSEC) 20 mg capsule TAKE 1 CAPSULE (20 MG TOTAL) BY MOUTH DAILY BEFORE BREAKFAST. 90 capsule 1    prazosin (MINIPRESS) 2 mg capsule Take 1 capsule (2 mg total) by mouth nightly.      QUEtiapine (SEROquel) 400 mg tablet Take 1 tablet (400 mg total) by mouth nightly.      cloNIDine (CATAPRES) 0.1 mg tablet       hydrALAZINE (APRESOLINE) 50 mg tablet       pantoprazole (PROTONIX) 40 mg EC tablet        No current facility-administered  medications for this visit.     Past Medical History:   Diagnosis Date    Alcoholism (CMS/[facility])     In Recovery  Since October 2016     Anxiety     Asthma     Remote    Bipolar affect, depressed (CMS/[facility])     Body mass index (BMI) 45.0-49.9, adult     Borderline type 2 diabetes mellitus 4/15/2019    Cocaine addiction (CMS/HCC)      None since November 2016    Delayed emergence from general anesthesia     Depression     GERD (gastroesophageal reflux disease)     History of snoring     Hypertension     Joint pain     Lipid disorder     Migraines     Morbid obesity with BMI of 45.0-49.9, adult (CMS/HCC)     Osteoarthritis     Knees    Post-menopause     Schizophrenia (CMS/HCC)     Sciatica     Sleep apnea     Uses CPAP occas- To bring CPAP Day of Sx    Urinary incontinence      Family History   Problem Relation Age of Onset    Colon cancer Biological Mother     Lung cancer Biological Father     Heart failure Biological Brother     Heart block Biological Daughter     Breast cancer Other     Breast cancer Cousin      Past Surgical History:   Procedure Laterality Date    CHOLECYSTECTOMY  2018    COLONOSCOPY      HYSTERECTOMY  2010    PARTIAL    JOINT REPLACEMENT      right knee    KNEE SURGERY Right     REDUCTION MAMMAPLASTY Bilateral     WISDOM TOOTH EXTRACTION       Social History     Tobacco Use    Smoking status: Some Days     Packs/day: 0.25     Years: 30.00     Additional pack years: 0.00     Total pack years: 7.50     Types: Cigarettes    Smokeless tobacco: Never    Tobacco comments:     3 cigarettes a week   Vaping Use    Vaping Use: Never used   Substance Use Topics    Alcohol use: Not Currently     Comment: Recovering Alcoholic since 10/2016    Drug use: No     Types: Cocaine     Comment: None since 11/2016     Allergies   Allergen Reactions    Shellfish Containing Products Angioedema     Other reaction(s): shellfish       Objective     Component      Latest Ref Rng  9/11/2023   WBC      3.8 - 10.8 Thousand/uL 5.9    RBC      3.80 - 5.10 Million/uL 4.53    Hemoglobin      11.7 - 15.5 g/dL 12.3    Hematocrit      35.0 - 45.0 % 37.6    MCV      80.0 - 100.0 fL 83.0    MCH      27.0 - 33.0 pg 27.2    MCHC      32.0 - 36.0 g/dL 32.7    RDW      11.0 - 15.0 % 13.7    Platelets      140 - 400 Thousand/uL 279    MPV      7.5 - 12.5 fL 11.6    Absolute Neutrophils      1,500 - 7,800 cells/uL 3,699    Absolute Lymphocytes      850 - 3,900 cells/uL 1,457    Absolute Monocytes      200 - 950 cells/uL 561    Absolute Eosinophils      15 - 500 cells/uL 100    Absolute Basophils      0 - 200 cells/uL 83    Neutrophils      % 62.7    Lymphocytes      % 24.7    Monocytes      % 9.5    Eosinophils      % 1.7    Basophils      % 1.4    Glucose      65 - 99 mg/dL 92    BUN      7 - 25 mg/dL 16    Creatinine      0.50 - 1.03 mg/dL 1.07 (H)    eGFR      > OR = 60 mL/min/1.73m2 61    Bun/Creatinine Ratio      6 - 22 (calc) 15    Sodium      135 - 146 mmol/L 142    Potassium      3.5 - 5.3 mmol/L 4.4    Chloride      98 - 110 mmol/L 107    CO2      20 - 32 mmol/L 27    Calcium      8.6 - 10.4 mg/dL 9.6    Total Protein      6.1 - 8.1 g/dL 6.9    Albumin      3.6 - 5.1 g/dL 4.3    Globulin      1.9 - 3.7 g/dL (calc) 2.6    ALBUMIN/GLOBULIN RATIO      1.0 - 2.5 (calc) 1.7    Bilirubin, Total      0.2 - 1.2 mg/dL 0.4    Alkaline Phosphatase      37 - 153 U/L 71    AST (SGOT)      10 - 35 U/L 12    ALT (SGPT)      6 - 29 U/L 10    Color, Urine      YELLOW  YELLOW    Clarity, Urine      CLEAR  CLEAR    Specific Gravity, Urine      1.001 - 1.035  1.017    pH, Urine      5.0 - 8.0  5.5    Glucose, Urine      NEGATIVE  NEGATIVE    Bilirubin, Urine      NEGATIVE  NEGATIVE    Ketones, Urine      NEGATIVE  NEGATIVE    Blood, Urine      NEGATIVE  NEGATIVE    Protein, Urine      NEGATIVE  NEGATIVE    Nitrite, Urine      NEGATIVE  NEGATIVE    Leukocyte Esterase      NEGATIVE  2+ !    WBC (UA)      < OR = 5 /HPF  "6-10 !    RBC (UA)      < OR = 2 /HPF NONE SEEN    SQUAMOUS EPITHELIAL CELLS (UA)      < OR = 5 /HPF 10-20 !    Bacteria (UA)      NONE SEEN /HPF NONE SEEN    HYALINE CAST (UA)      NONE SEEN /LPF NONE SEEN    Note SEE COMMENT    Cholesterol      <200 mg/dL 193    HDL      > OR = 50 mg/dL 71    Triglycerides      <150 mg/dL 142    LDL Calculated      mg/dL (calc) 98    Chol/Hdlc Ratio      <5.0 (calc) 2.7    Non-HDL, Calculated      <130 mg/dL (calc) 122    Creatinine, Urine      20 - 275 mg/dL 179    Microalbumin, Ur      See Note: mg/dL 0.5    Microalb/Creat Ratio      <30 mcg/mg creat 3    Hemoglobin A1C      <5.7 % of total Hgb 5.8 (H)    TSH W/Reflex To Ft4      0.40 - 4.50 mIU/L 2.07    Vit D, 25-Hydroxy      30 - 100 ng/mL 17 (L)       Legend:  (H) High  ! Abnormal  (L) Low      Vitals:    10/17/23 1107   BP: 120/70   BP Location: Left upper arm   Patient Position: Sitting   Pulse: 75   Resp: 16   Temp: 36.8 °C (98.2 °F)   TempSrc: Temporal   SpO2: 98%   Weight: 135 kg (298 lb)   Height: 1.727 m (5' 8\")         Physical Exam  Vitals and nursing note reviewed.   Constitutional:       Appearance: Normal appearance. She is well-developed.   HENT:      Head: Normocephalic and atraumatic.   Eyes:      Extraocular Movements: Extraocular movements intact.      Conjunctiva/sclera: Conjunctivae normal.   Pulmonary:      Effort: Pulmonary effort is normal.   Musculoskeletal:         General: Normal range of motion.      Cervical back: Normal range of motion.   Neurological:      General: No focal deficit present.      Mental Status: She is alert and oriented to person, place, and time.   Psychiatric:         Mood and Affect: Mood normal.         Behavior: Behavior normal.         Thought Content: Thought content normal.         Judgment: Judgment normal.         Assessment/Plan   Problem List Items Addressed This Visit        Endocrine/Metabolic    Prediabetes    Current Assessment & Plan     Recommend low carb diet, " moderate exercise and weight loss. Decrease intake of bread, rice, pasta, starches, juice, soda and sweets.            Mental Health    Depression    Current Assessment & Plan     Continue follow up with Psychiatry         Relevant Medications    buPROPion XL (WELLBUTRIN XL) 150 mg 24 hr tablet    DULoxetine (CYMBALTA) 30 mg capsule    DULoxetine (CYMBALTA) 60 mg capsule    prazosin (MINIPRESS) 2 mg capsule    QUEtiapine (SEROquel) 400 mg tablet    Anxiety    Current Assessment & Plan     Continue follow up with Psychiatry         Relevant Medications    buPROPion XL (WELLBUTRIN XL) 150 mg 24 hr tablet    DULoxetine (CYMBALTA) 30 mg capsule    DULoxetine (CYMBALTA) 60 mg capsule    prazosin (MINIPRESS) 2 mg capsule    QUEtiapine (SEROquel) 400 mg tablet    Bipolar affective disorder, current episode manic (CMS/HCC)    Current Assessment & Plan     Continue follow up with Psychiatry         Relevant Medications    buPROPion XL (WELLBUTRIN XL) 150 mg 24 hr tablet    DULoxetine (CYMBALTA) 30 mg capsule    DULoxetine (CYMBALTA) 60 mg capsule    prazosin (MINIPRESS) 2 mg capsule    QUEtiapine (SEROquel) 400 mg tablet    PTSD (post-traumatic stress disorder)    Current Assessment & Plan     Continue follow up with Psychiatry         Relevant Medications    buPROPion XL (WELLBUTRIN XL) 150 mg 24 hr tablet    DULoxetine (CYMBALTA) 30 mg capsule    DULoxetine (CYMBALTA) 60 mg capsule    prazosin (MINIPRESS) 2 mg capsule    QUEtiapine (SEROquel) 400 mg tablet    Schizophrenia (CMS/HCC)    Current Assessment & Plan     Continue follow up with Psychiatry         Relevant Medications    buPROPion XL (WELLBUTRIN XL) 150 mg 24 hr tablet    DULoxetine (CYMBALTA) 30 mg capsule    DULoxetine (CYMBALTA) 60 mg capsule    prazosin (MINIPRESS) 2 mg capsule    QUEtiapine (SEROquel) 400 mg tablet    History of substance abuse (CMS/HCC)    Current Assessment & Plan     Clean since recent hospitalization on 9/27/2023!            Other     Mass of spinal cord (CMS/HCC) - Primary    Relevant Orders    Ambulatory referral to Neurosurgery       Prosper Grullon MD  10/17/2023

## 2023-10-17 NOTE — TELEPHONE ENCOUNTER
New Patient:   Referred to Dr. Varela    Referring Provider:   Dr. Prosper Grullon (PCP)    MRI: Lumbar Spine 8/23/23 & 8/11/23 @ [facility]    X-rays: No    Dexa: No    EMG:     Onset of sympotms/reason for visit:   Patient has pain in both legs mainly in thighs when she is laying down. Low back pain when sitting down. She cannot get in and out of chairs as it's very painful. Walking up and down stairs is very painful. Symptoms started 1-2 years ago and have gotten progressively worse. Pain 7-10/10.     Physical Therapy: No    Pain Management: No    Previous Surgery: No    Endocrinologist: No    Rheumatologist: No    Insurance: [employer]

## 2023-11-03 ENCOUNTER — OFFICE VISIT (OUTPATIENT)
Dept: NEUROSURGERY | Facility: CLINIC | Age: 58
End: 2023-11-03
Payer: COMMERCIAL

## 2023-11-03 VITALS
OXYGEN SATURATION: 96 % | SYSTOLIC BLOOD PRESSURE: 130 MMHG | DIASTOLIC BLOOD PRESSURE: 86 MMHG | HEIGHT: 68 IN | HEART RATE: 84 BPM | BODY MASS INDEX: 44.41 KG/M2 | WEIGHT: 293 LBS

## 2023-11-03 DIAGNOSIS — M47.816 LUMBAR SPONDYLOSIS: Primary | ICD-10-CM

## 2023-11-03 PROCEDURE — 3075F SYST BP GE 130 - 139MM HG: CPT | Performed by: NEUROLOGICAL SURGERY

## 2023-11-03 PROCEDURE — 99204 OFFICE O/P NEW MOD 45 MIN: CPT | Performed by: NEUROLOGICAL SURGERY

## 2023-11-03 PROCEDURE — 3079F DIAST BP 80-89 MM HG: CPT | Performed by: NEUROLOGICAL SURGERY

## 2023-11-03 PROCEDURE — 3008F BODY MASS INDEX DOCD: CPT | Performed by: NEUROLOGICAL SURGERY

## 2023-11-03 NOTE — PROGRESS NOTES
.      [facility]  Medical Office [address], [address]  [place] [address]  Phone: [phone]  Fax: [phone]      Patient ID: Kar Nicholas                              : 1965    Visit Date: 11/3/2023    Referring Provider: Prosper Grullon MD    Chief Complaint / History of Present Illness:   Kar Nicholas is a pleasant 58 y.o. female seen today as a consult for a lumbar schwannaoma. She had a right total knee replacement a few years ago that she was doing well from. She began to have difficulty walking and particularly when going from a sit to stand position. She returned back to her orthopedic knee surgeon who reported that it was not coming her right knee and he ordered cervical and lumbar spine imaging. Lumbar MRI revealed a 5mm mass in the right aspect of the thecal sac at the L1 level, representing a schewannoma/neurofibroma. She presents to the clinic today for further evaluation and recommendations. She continues with low back pain but predominately reports bilateral lower extremity pain. Most of the pain is in the anterior thighs to the knees. She reports multiple falls and symptoms of urinary incontinence. She has seen urology and started on Myrbetriq which has been helpful. She denies WHOL, speech difficulties, visual changes. She presents to the clinic today for further evaluation and recommendations.     Allergies:  Allergies   Allergen Reactions    Shellfish Containing Products Angioedema     Other reaction(s): shellfish       Medications:     Current Outpatient Medications:     atorvastatin (LIPITOR) 10 mg tablet, Take 1 tablet (10 mg total) by mouth nightly., Disp: 30 tablet, Rfl: 1    buPROPion XL (WELLBUTRIN XL) 150 mg 24 hr tablet, Take 1 tablet (150 mg total) by mouth daily., Disp: , Rfl:     cloNIDine (CATAPRES) 0.1 mg tablet, , Disp: , Rfl:     DULoxetine (CYMBALTA) 60 mg capsule, Take 1 capsule (60 mg total) by mouth daily.,  Disp: , Rfl:     losartan (COZAAR) 100 mg tablet, Take 1 tablet (100 mg total) by mouth daily., Disp: 90 tablet, Rfl: 1    MYRBETRIQ 50 mg ER tablet, , Disp: , Rfl:     omeprazole (PriLOSEC) 20 mg capsule, TAKE 1 CAPSULE (20 MG TOTAL) BY MOUTH DAILY BEFORE BREAKFAST., Disp: 90 capsule, Rfl: 1    QUEtiapine (SEROquel) 400 mg tablet, Take 1 tablet (400 mg total) by mouth nightly., Disp: , Rfl:     DULoxetine (CYMBALTA) 30 mg capsule, Take 1 capsule (30 mg total) by mouth daily. (Patient not taking: Reported on 11/3/2023), Disp: , Rfl:     hydrALAZINE (APRESOLINE) 50 mg tablet, , Disp: , Rfl:     nalTREXone (DEPADE) 50 mg tablet, 1 tablet (50 mg total) daily., Disp: , Rfl:     pantoprazole (PROTONIX) 40 mg EC tablet, , Disp: , Rfl:     prazosin (MINIPRESS) 2 mg capsule, Take 1 capsule (2 mg total) by mouth nightly. (Patient not taking: Reported on 11/3/2023), Disp: , Rfl:      Past Medical History:   Past Medical History:   Diagnosis Date    Alcoholism (CMS/[facility])     In Recovery  Since October 2016     Anxiety     Asthma     Remote    Bipolar affect, depressed (CMS/[facility])     Body mass index (BMI) 45.0-49.9, adult     Borderline type 2 diabetes mellitus 4/15/2019    Cocaine addiction (CMS/[facility])      None since November 2016    Delayed emergence from general anesthesia     Depression     GERD (gastroesophageal reflux disease)     History of snoring     Hypertension     Joint pain     Lipid disorder     Migraines     Morbid obesity with BMI of 45.0-49.9, adult (CMS/[facility])     Osteoarthritis     Knees    Post-menopause     Schizophrenia (CMS/[facility])     Sciatica     Sleep apnea     Uses CPAP occas- To bring CPAP Day of Sx    Urinary incontinence        Past Surgical History:   Past Surgical History:   Procedure Laterality Date    CHOLECYSTECTOMY  2018    COLONOSCOPY      HYSTERECTOMY  2010    PARTIAL    JOINT REPLACEMENT      right knee    KNEE SURGERY Right     REDUCTION MAMMAPLASTY Bilateral      WISDOM TOOTH EXTRACTION         Family History:  Family History   Problem Relation Age of Onset    Colon cancer Biological Mother     Lung cancer Biological Father     Heart failure Biological Brother     Heart block Biological Daughter     Breast cancer Other     Breast cancer Cousin        Social History:  Social History     Socioeconomic History    Marital status: Single     Spouse name: Not on file    Number of children: Not on file    Years of education: Not on file    Highest education level: Not on file   Occupational History    Not on file   Tobacco Use    Smoking status: Some Days     Packs/day: 0.25     Years: 30.00     Additional pack years: 0.00     Total pack years: 7.50     Types: Cigarettes    Smokeless tobacco: Never    Tobacco comments:     3 cigarettes a week   Vaping Use    Vaping Use: Never used   Substance and Sexual Activity    Alcohol use: Not Currently     Comment: Recovering Alcoholic since 10/2016    Drug use: No     Types: Cocaine     Comment: None since 11/2016    Sexual activity: Defer   Other Topics Concern    Not on file   Social History Narrative    Not on file     Social Determinants of Health     Financial Resource Strain: Not on file   Food Insecurity: Not on file   Transportation Needs: Not on file   Physical Activity: Not on file   Stress: Not on file   Social Connections: Not on file   Intimate Partner Violence: Not on file   Housing Stability: Not on file       Vitals:   Vitals:    11/03/23 1128   BP: 130/86   Pulse: 84   SpO2: 96%       Review of Systems:  A complete 14 point review of systems was conducted and was deemed negative except what was documented in the HPI.    Physical Examination:  Physical Exam   Constitutional: Oriented to person, place, and time. Appears well-developed and well-nourished.   Head: Normocephalic and atraumatic.   Right Ear: External ear normal.   Left Ear: External ear normal.   Nose: Nose normal.   Mouth/Throat:  Oropharynx is clear and moist.   Eyes: Conjunctivae and EOM are normal. Pupils are equal, round, and reactive to light. No scleral icterus.   Neck: Normal range of motion.   Cardiovascular: Normal rate.    Pulmonary/Chest: No respiratory distress.   Abdominal: Soft. Exhibits no distension.  Musculoskeletal: Normal range of motion. No edema or tenderness.   Neurological: Oriented to person, place, and time.   Skin: Skin is warm and dry. No rash noted. No erythema.   Psychiatric: Normal mood and affect. Speech is normal and behavior is normal. Thought content normal.     Vitals reviewed.    Neurological Examination:  Neurologic Exam     Mental Status   Oriented to person, place, and time.   Attention: normal.   Speech: speech is normal   Level of consciousness: alert    Cranial Nerves     CN II: Visual fields are full to confrontation.  Pupils are equal and briskly reactive to light.   CN III, IV, VI: Extra-occular muscles are intact  CN V: Facial sensation is intact and equal in V1-V3 distributions bilaterally.   CN VII: Face is symmetric with normal eye closure and smile.  CN VIII: Hearing is normal to rubbing fingers  CN IX, X: Palate elevates symmetrically. Phonation is normal.  CN XI: Head turning and shoulder shrug are intact  CN XII: Tongue is midline with normal movements and no atrophy.    Sensation ( /2)    Right Left  Right Left   C5 2 2 L2 2 2   C6 2 2 L3 2 2   C7 2 2 L4 2 2   C8 2 2 L5 2 2   T1 2 2 S1 2 2     Motor:     Deltoid Biceps Triceps Wrist ext Finger ext Hand Intrinsics Hip flexion Knee ext Dorsi-  flexion EHL Plantar Flexion   R 5 5 5 5 5 5 5 5 5 5 5   L 5 5 5 5 5 5 5 5 5 5 5     There is no pronator drift. Muscle bulk and tone are normal.     Gait, Coordination, and Reflexes     Reflexes   Reflexes 2+ except as noted.   Right Davison: absent  Left Davison: absent  Right ankle clonus: absent  Left ankle clonus: absent      Data Review:  Lab Results:   Lab Results   Component Value Date    WBC  5.9 09/11/2023    WBC 4.54 10/04/2019    HGB 12.3 09/11/2023    HGB 12.5 10/04/2019    HCT 37.6 09/11/2023    HCT 40.0 10/04/2019    MCV 83.0 09/11/2023    MCV 89.5 10/04/2019     09/11/2023     10/04/2019    RDW 13.7 09/11/2023    RDW 13.3 10/04/2019      Lab Results   Component Value Date    GLUCOSE 92 09/11/2023    GLUCOSE NEGATIVE 09/11/2023    GLUCOSE 91 10/04/2019    BUN 16 09/11/2023    BUN 6 (L) 10/04/2019     09/11/2023     10/04/2019    K 4.4 09/11/2023    K 4.3 10/04/2019     09/11/2023     10/04/2019    CO2 27 09/11/2023    CO2 27 10/04/2019    ANIONGAP 10 10/04/2019    CA 9.3 05/29/2018        Imaging:  MRI CERVICAL SPINE WITHOUT CONTRAST 8/11/2023  COMMENT:  Visualized portion of the posterior fossa and the spinal cord are without mass  or signal abnormality.  Vertebral bodies are normal is stature.   Mixed type I and type II endplate  degenerative signal is seen at C4-C5, C5-C6 and at C6-C7 levels.  Type II  endplate degenerative signal is seen at C7-T1 level.  At C2-C3 level, no degenerative disc disease is identified.  At C3-C4 level, minimal spondylolisthesis is seen where C4 vertebral body is  posterior to C3 vertebral body.  Minimal broad-based posterior disc osteophyte  bulge is seen with bilateral uncovertebral and facet joint hypertrophy.  This is  causing minimal mass effect on the thecal sac without right, mild to moderate  left neural foraminal stenosis.  At C4-C5 level, mild spondylolisthesis is seen where C5 vertebral body is  posterior to C4 vertebral body.  Mild broad-based posterior disc osteophyte  bulge is seen with bilateral uncovertebral and facet joint hypertrophy.  This is  causing mild mass effect on the anterior portion of the spinal cord and mild  bilateral neural foraminal stenosis.  At C5-C6 level, minimal spondylolisthesis is seen where C6 vertebral body is  posterior to C5 vertebral body.  Minimal broad-based posterior disc  osteophyte  bulge is seen with focal left paracentral disc osteophyte protrusion.  Bilateral  uncovertebral and facet joint hypertrophy is noted.  This is causing mild mass  effect on the anterior left aspect of the spinal cord and mild bilateral neural  foraminal stenosis.  At C6-C7 level, mild broad-based posterior disc osteophyte bulge is seen with  focal right paracentral disc osteophyte protrusion.  This is causing mild mass  effect on the anterior right aspect of the spinal cord without neural foramina  stenosis.  At C7-T1 level, minimal broad-based posterior disc osteophyte bulge is seen with  minimal bilateral uncovertebral and facet joint hypertrophy.  This is causing  minimal mass effect on the thecal sac and minimal bilateral neural foraminal  stenosis.  IMPRESSION:  Degenerative disc disease as described.    MRI LUMBAR SPINE WITH/WITHOUT CONTRAST 8/31/2023  Findings:  Examination was primarily tailored to postcontrast sequences given recent  complete unenhanced lumbar MRI.  Redemonstrated circumscribed 6-7 cm nodule in the right aspect of the cauda  equina at the L1 level, unchanged in the interval. This demonstrates bright  homogeneous hyperenhancement, in keeping with peripheral nerve sheath  tumor/schwannoma.  Otherwise, no abnormal enhancement in the vertebral column or spinal canal.  Redemonstrated mild degenerative changes of the lumbar spine without high-grade  spinal canal stenosis or neural foraminal narrowing.  IMPRESSION:  1. Unchanged size of nodule along the right cauda equina nerve roots at L1,  which demonstrates homogeneous hyperenhancement in keeping with peripheral nerve  sheath tumor/schwannoma.  2. No additional abnormal focus of enhancement in the lumbar spine.     Independent review of all imaging was done by myself as well as review of the radiologists readings and comparison to prior films.     Assessment / Plan:     In summary, Kar Nicholas is a pleasant 58 y.o. female seen  today as a consult for a lumbar schwannoma. She began to have difficulty walking and particularly when going from a sit to stand position. Lumbar MRI was order which revealed a 5mm mass in the right aspect of the thecal sac at the L1 level, representing a schwannoma/neurofibroma. She continues with low back pain but predominately reports bilateral lower extremity pain. Most of the pain is in the anterior thighs to the knees. She reports multiple falls.     The size and location of the schwannoma does fully correlate with the patient's symptoms.  The lesion is not large enough to cause radiculopathy or weakness.      At this time we mutually decided to progress forward with a referral to pain management for better pain control modalities. We will order MRI thoracic spine and MRI brain without contrast to complete the schwannoma work up along with a repeat MRI lumbar in six months time to ensure stability. She will call our office with any additional questions or concerns.           45 minutes were spent with the patient on examination, review of past medical history, and prior imaging, and coordinating care.  Patient seen earlier today. Signature timestamp does not reflect patient encounter time.   More than 50% of the time is spent counseling the patient and family and coordinating care.

## 2023-11-03 NOTE — LETTER
2023     Prosper Grullon MD  [address] [facility] [address]  [facility] [address]    Patient: Kar Nicholas  YOB: 1965  Date of Visit: 11/3/2023      Dear Dr. Grullon:    Thank you for referring Kar Nicholas to me for evaluation. Below are my notes for this consultation.    If you have questions, please do not hesitate to call me. I look forward to following your patient along with you.         Sincerely,        Pablo Varela MD        CC: Nancie Ramsay NP  11/3/2023  4:15 PM  Cosign Needed Addendum  .      [facility]  Medical Office Building [address], [address]  [place] [address]  Phone: [phone]  Fax: [phone]      Patient ID: Kar Nicholas                              : 1965    Visit Date: 11/3/2023    Referring Provider: Prosper Grullon MD    Chief Complaint / History of Present Illness:   Kar Nicholas is a pleasant 58 y.o. female seen today as a consult for a lumbar schwannaoma. She had a right total knee replacement a few years ago that she was doing well from. She began to have difficulty walking and particularly when going from a sit to stand position. She returned back to her orthopedic knee surgeon who reported that it was not coming her right knee and he ordered cervical and lumbar spine imaging. Lumbar MRI revealed a 5mm mass in the right aspect of the thecal sac at the L1 level, representing a schewannoma/neurofibroma. She presents to the clinic today for further evaluation and recommendations. She continues with low back pain but predominately reports bilateral lower extremity pain. Most of the pain is in the anterior thighs to the knees. She reports multiple falls and symptoms of urinary incontinence. She has seen urology and started on Myrbetriq which has been helpful. She denies WHOL, speech difficulties, visual changes. She presents to the clinic today for further evaluation and  recommendations.     Allergies:  Allergies   Allergen Reactions    Shellfish Containing Products Angioedema     Other reaction(s): shellfish       Medications:     Current Outpatient Medications:     atorvastatin (LIPITOR) 10 mg tablet, Take 1 tablet (10 mg total) by mouth nightly., Disp: 30 tablet, Rfl: 1    buPROPion XL (WELLBUTRIN XL) 150 mg 24 hr tablet, Take 1 tablet (150 mg total) by mouth daily., Disp: , Rfl:     cloNIDine (CATAPRES) 0.1 mg tablet, , Disp: , Rfl:     DULoxetine (CYMBALTA) 60 mg capsule, Take 1 capsule (60 mg total) by mouth daily., Disp: , Rfl:     losartan (COZAAR) 100 mg tablet, Take 1 tablet (100 mg total) by mouth daily., Disp: 90 tablet, Rfl: 1    MYRBETRIQ 50 mg ER tablet, , Disp: , Rfl:     omeprazole (PriLOSEC) 20 mg capsule, TAKE 1 CAPSULE (20 MG TOTAL) BY MOUTH DAILY BEFORE BREAKFAST., Disp: 90 capsule, Rfl: 1    QUEtiapine (SEROquel) 400 mg tablet, Take 1 tablet (400 mg total) by mouth nightly., Disp: , Rfl:     DULoxetine (CYMBALTA) 30 mg capsule, Take 1 capsule (30 mg total) by mouth daily. (Patient not taking: Reported on 11/3/2023), Disp: , Rfl:     hydrALAZINE (APRESOLINE) 50 mg tablet, , Disp: , Rfl:     nalTREXone (DEPADE) 50 mg tablet, 1 tablet (50 mg total) daily., Disp: , Rfl:     pantoprazole (PROTONIX) 40 mg EC tablet, , Disp: , Rfl:     prazosin (MINIPRESS) 2 mg capsule, Take 1 capsule (2 mg total) by mouth nightly. (Patient not taking: Reported on 11/3/2023), Disp: , Rfl:      Past Medical History:   Past Medical History:   Diagnosis Date    Alcoholism (CMS/Formerly [facility])     In Recovery  Since October 2016     Anxiety     Asthma     Remote    Bipolar affect, depressed (CMS/[facility]ly [facility])     Body mass index (BMI) 45.0-49.9, adult     Borderline type 2 diabetes mellitus 4/15/2019    Cocaine addiction (CMS/[facility]ly [facility])      None since November 2016    Delayed emergence from general anesthesia     Depression     GERD (gastroesophageal reflux disease)     History of snoring      Hypertension     Joint pain     Lipid disorder     Migraines     Morbid obesity with BMI of 45.0-49.9, adult (CMS/Formerly [facility])     Osteoarthritis     Knees    Post-menopause     Schizophrenia (CMS/HCC)     Sciatica     Sleep apnea     Uses CPAP occas- To bring CPAP Day of Sx    Urinary incontinence        Past Surgical History:   Past Surgical History:   Procedure Laterality Date    CHOLECYSTECTOMY  2018    COLONOSCOPY      HYSTERECTOMY  2010    PARTIAL    JOINT REPLACEMENT      right knee    KNEE SURGERY Right     REDUCTION MAMMAPLASTY Bilateral     WISDOM TOOTH EXTRACTION         Family History:  Family History   Problem Relation Age of Onset    Colon cancer Biological Mother     Lung cancer Biological Father     Heart failure Biological Brother     Heart block Biological Daughter     Breast cancer Other     Breast cancer Cousin        Social History:  Social History     Socioeconomic History    Marital status: Single     Spouse name: Not on file    Number of children: Not on file    Years of education: Not on file    Highest education level: Not on file   Occupational History    Not on file   Tobacco Use    Smoking status: Some Days     Packs/day: 0.25     Years: 30.00     Additional pack years: 0.00     Total pack years: 7.50     Types: Cigarettes    Smokeless tobacco: Never    Tobacco comments:     3 cigarettes a week   Vaping Use    Vaping Use: Never used   Substance and Sexual Activity    Alcohol use: Not Currently     Comment: Recovering Alcoholic since 10/2016    Drug use: No     Types: Cocaine     Comment: None since 11/2016    Sexual activity: Defer   Other Topics Concern    Not on file   Social History Narrative    Not on file     Social Determinants of Health     Financial Resource Strain: Not on file   Food Insecurity: Not on file   Transportation Needs: Not on file   Physical Activity: Not on file   Stress: Not on file   Social Connections: Not on file   Intimate  Partner Violence: Not on file   Housing Stability: Not on file       Vitals:   Vitals:    11/03/23 1128   BP: 130/86   Pulse: 84   SpO2: 96%       Review of Systems:  A complete 14 point review of systems was conducted and was deemed negative except what was documented in the HPI.    Physical Examination:  Physical Exam   Constitutional: Oriented to person, place, and time. Appears well-developed and well-nourished.   Head: Normocephalic and atraumatic.   Right Ear: External ear normal.   Left Ear: External ear normal.   Nose: Nose normal.   Mouth/Throat: Oropharynx is clear and moist.   Eyes: Conjunctivae and EOM are normal. Pupils are equal, round, and reactive to light. No scleral icterus.   Neck: Normal range of motion.   Cardiovascular: Normal rate.    Pulmonary/Chest: No respiratory distress.   Abdominal: Soft. Exhibits no distension.  Musculoskeletal: Normal range of motion. No edema or tenderness.   Neurological: Oriented to person, place, and time.   Skin: Skin is warm and dry. No rash noted. No erythema.   Psychiatric: Normal mood and affect. Speech is normal and behavior is normal. Thought content normal.     Vitals reviewed.    Neurological Examination:  Neurologic Exam     Mental Status   Oriented to person, place, and time.   Attention: normal.   Speech: speech is normal   Level of consciousness: alert    Cranial Nerves     CN II: Visual fields are full to confrontation.  Pupils are equal and briskly reactive to light.   CN III, IV, VI: Extra-occular muscles are intact  CN V: Facial sensation is intact and equal in V1-V3 distributions bilaterally.   CN VII: Face is symmetric with normal eye closure and smile.  CN VIII: Hearing is normal to rubbing fingers  CN IX, X: Palate elevates symmetrically. Phonation is normal.  CN XI: Head turning and shoulder shrug are intact  CN XII: Tongue is midline with normal movements and no atrophy.    Sensation ( /2)    Right Left  Right Left   C5 2 2 L2 2 2   C6 2 2 L3  2 2   C7 2 2 L4 2 2   C8 2 2 L5 2 2   T1 2 2 S1 2 2     Motor:     Deltoid Biceps Triceps Wrist ext Finger ext Hand Intrinsics Hip flexion Knee ext Dorsi-  flexion EHL Plantar Flexion   R 5 5 5 5 5 5 5 5 5 5 5   L 5 5 5 5 5 5 5 5 5 5 5     There is no pronator drift. Muscle bulk and tone are normal.     Gait, Coordination, and Reflexes     Reflexes   Reflexes 2+ except as noted.   Right Davison: absent  Left Davison: absent  Right ankle clonus: absent  Left ankle clonus: absent      Data Review:  Lab Results:   Lab Results   Component Value Date    WBC 5.9 09/11/2023    WBC 4.54 10/04/2019    HGB 12.3 09/11/2023    HGB 12.5 10/04/2019    HCT 37.6 09/11/2023    HCT 40.0 10/04/2019    MCV 83.0 09/11/2023    MCV 89.5 10/04/2019     09/11/2023     10/04/2019    RDW 13.7 09/11/2023    RDW 13.3 10/04/2019      Lab Results   Component Value Date    GLUCOSE 92 09/11/2023    GLUCOSE NEGATIVE 09/11/2023    GLUCOSE 91 10/04/2019    BUN 16 09/11/2023    BUN 6 (L) 10/04/2019     09/11/2023     10/04/2019    K 4.4 09/11/2023    K 4.3 10/04/2019     09/11/2023     10/04/2019    CO2 27 09/11/2023    CO2 27 10/04/2019    ANIONGAP 10 10/04/2019    CA 9.3 05/29/2018        Imaging:  MRI CERVICAL SPINE WITHOUT CONTRAST 8/11/2023  COMMENT:  Visualized portion of the posterior fossa and the spinal cord are without mass  or signal abnormality.  Vertebral bodies are normal is stature.   Mixed type I and type II endplate  degenerative signal is seen at C4-C5, C5-C6 and at C6-C7 levels.  Type II  endplate degenerative signal is seen at C7-T1 level.  At C2-C3 level, no degenerative disc disease is identified.  At C3-C4 level, minimal spondylolisthesis is seen where C4 vertebral body is  posterior to C3 vertebral body.  Minimal broad-based posterior disc osteophyte  bulge is seen with bilateral uncovertebral and facet joint hypertrophy.  This is  causing minimal mass effect on the thecal sac without right,  mild to moderate  left neural foraminal stenosis.  At C4-C5 level, mild spondylolisthesis is seen where C5 vertebral body is  posterior to C4 vertebral body.  Mild broad-based posterior disc osteophyte  bulge is seen with bilateral uncovertebral and facet joint hypertrophy.  This is  causing mild mass effect on the anterior portion of the spinal cord and mild  bilateral neural foraminal stenosis.  At C5-C6 level, minimal spondylolisthesis is seen where C6 vertebral body is  posterior to C5 vertebral body.  Minimal broad-based posterior disc osteophyte  bulge is seen with focal left paracentral disc osteophyte protrusion.  Bilateral  uncovertebral and facet joint hypertrophy is noted.  This is causing mild mass  effect on the anterior left aspect of the spinal cord and mild bilateral neural  foraminal stenosis.  At C6-C7 level, mild broad-based posterior disc osteophyte bulge is seen with  focal right paracentral disc osteophyte protrusion.  This is causing mild mass  effect on the anterior right aspect of the spinal cord without neural foramina  stenosis.  At C7-T1 level, minimal broad-based posterior disc osteophyte bulge is seen with  minimal bilateral uncovertebral and facet joint hypertrophy.  This is causing  minimal mass effect on the thecal sac and minimal bilateral neural foraminal  stenosis.  IMPRESSION:  Degenerative disc disease as described.    MRI LUMBAR SPINE WITH/WITHOUT CONTRAST 8/31/2023  Findings:  Examination was primarily tailored to postcontrast sequences given recent  complete unenhanced lumbar MRI.  Redemonstrated circumscribed 6-7 cm nodule in the right aspect of the cauda  equina at the L1 level, unchanged in the interval. This demonstrates bright  homogeneous hyperenhancement, in keeping with peripheral nerve sheath  tumor/schwannoma.  Otherwise, no abnormal enhancement in the vertebral column or spinal canal.  Redemonstrated mild degenerative changes of the lumbar spine without  high-grade  spinal canal stenosis or neural foraminal narrowing.  IMPRESSION:  1. Unchanged size of nodule along the right cauda equina nerve roots at L1,  which demonstrates homogeneous hyperenhancement in keeping with peripheral nerve  sheath tumor/schwannoma.  2. No additional abnormal focus of enhancement in the lumbar spine.     Independent review of all imaging was done by myself as well as review of the radiologists readings and comparison to prior films.     Assessment / Plan:     In summary, Kar Nicholas is a pleasant 58 y.o. female seen today as a consult for a lumbar schwannoma. She began to have difficulty walking and particularly when going from a sit to stand position. Lumbar MRI was order which revealed a 5mm mass in the right aspect of the thecal sac at the L1 level, representing a schwannoma/neurofibroma. She continues with low back pain but predominately reports bilateral lower extremity pain. Most of the pain is in the anterior thighs to the knees. She reports multiple falls.     The size and location of the schwannoma does fully correlate with the patient's symptoms.  The lesion is not large enough to cause radiculopathy or weakness.      At this time we mutually decided to progress forward with a referral to pain management for better pain control modalities. We will order MRI thoracic spine and MRI brain without contrast to complete the schwannoma work up along with a repeat MRI lumbar in six months time to ensure stability. She will call our office with any additional questions or concerns.           45 minutes were spent with the patient on examination, review of past medical history, and prior imaging, and coordinating care.  Patient seen earlier today. Signature timestamp does not reflect patient encounter time.   More than 50% of the time is spent counseling the patient and family and coordinating care.

## 2023-11-07 ENCOUNTER — TELEPHONE (OUTPATIENT)
Dept: NEUROSURGERY | Facility: CLINIC | Age: 58
End: 2023-11-07
Payer: COMMERCIAL

## 2023-11-07 NOTE — TELEPHONE ENCOUNTER
No auth required for [facility] health choices as secondary to medicare advantage plan .   Ref#: Allyson W 11/7/2023

## 2023-11-07 NOTE — TELEPHONE ENCOUNTER
MRI Brain and T spine +/- approved for [facility] thru 12/7/23.   [employer]) [phone]  Auth#: [id number]    Spoke to patient, gave auth and scheduling info, we plan to call patient with results.

## 2023-11-10 ENCOUNTER — TELEPHONE (OUTPATIENT)
Dept: FAMILY MEDICINE | Facility: CLINIC | Age: 58
End: 2023-11-10
Payer: COMMERCIAL

## 2023-11-10 DIAGNOSIS — E78.00 HYPERCHOLESTEROLEMIA: ICD-10-CM

## 2023-11-10 DIAGNOSIS — K21.9 GASTROESOPHAGEAL REFLUX DISEASE WITHOUT ESOPHAGITIS: ICD-10-CM

## 2023-11-10 RX ORDER — ATORVASTATIN CALCIUM 10 MG/1
10 TABLET, FILM COATED ORAL NIGHTLY
Qty: 30 TABLET | Refills: 1 | Status: SHIPPED | OUTPATIENT
Start: 2023-11-10 | End: 2024-02-14 | Stop reason: SDUPTHER

## 2023-11-10 RX ORDER — OMEPRAZOLE 20 MG/1
20 CAPSULE, DELAYED RELEASE ORAL
Qty: 30 CAPSULE | Refills: 1 | Status: SHIPPED | OUTPATIENT
Start: 2023-11-10 | End: 2024-02-14 | Stop reason: SDUPTHER

## 2023-11-10 NOTE — TELEPHONE ENCOUNTER
Medication Request   Patient PCP: Prosper Grullon MD  Next Office Visit: Visit date not found  Has this provider prescribed this medication before?: yes  Medication Name: omeprazole (PriLOSEC)   Medication Dose:20 mg capsule  Medication Frequency:   Preferred Pharmacy:   [place] [address]  Phone: [phone] Fax: [phone]      Please allow 2 business days for your provider to send your medication request or to reach out to discuss.     Medication Request   Patient PCP: Prosper Grullon MD  Next Office Visit: Visit date not found  Has this provider prescribed this medication before?: yes  Medication Name: atorvastatin (LIPITOR)   Medication Dose: 10 mg tablet  Medication Frequency:   Preferred Pharmacy:   [place] [address]  Phone: [phone] Fax: [phone]      Please allow 2 business days for your provider to send your medication request or to reach out to discuss.

## 2023-11-30 ENCOUNTER — HOSPITAL ENCOUNTER (OUTPATIENT)
Dept: RADIOLOGY | Facility: HOSPITAL | Age: 58
Discharge: HOME | End: 2023-11-30
Attending: NEUROLOGICAL SURGERY
Payer: COMMERCIAL

## 2023-11-30 DIAGNOSIS — M47.816 LUMBAR SPONDYLOSIS: ICD-10-CM

## 2023-11-30 RX ORDER — GADOBUTROL 604.72 MG/ML
13.6 INJECTION INTRAVENOUS ONCE
Status: COMPLETED | OUTPATIENT
Start: 2023-11-30 | End: 2023-11-30

## 2023-11-30 RX ADMIN — GADOBUTROL 13.6 ML: 604.72 INJECTION INTRAVENOUS at 14:29

## 2023-12-12 ENCOUNTER — TELEPHONE (OUTPATIENT)
Dept: FAMILY MEDICINE | Facility: CLINIC | Age: 58
End: 2023-12-12
Payer: COMMERCIAL

## 2023-12-12 DIAGNOSIS — R29.898 WEAKNESS OF RIGHT LOWER EXTREMITY: Primary | ICD-10-CM

## 2023-12-12 NOTE — TELEPHONE ENCOUNTER
Sabrina from [facility] called requesting a script for PT for patient. Patient stated to them that she is having right leg weakness and that is why she needs PT. When script is complete it can be faxed to [phone]. Please advise. Thanks!

## 2023-12-14 NOTE — TELEPHONE ENCOUNTER
Patient called to check the status of her request for Physical Therapy. Can someone please give her a call

## 2023-12-14 NOTE — TELEPHONE ENCOUNTER
Sabrina from [facility] called requesting a follow up on the script for PT. When script is complete it can be faxed to [phone].

## 2024-01-02 ENCOUNTER — TELEPHONE (OUTPATIENT)
Dept: NEUROSURGERY | Facility: CLINIC | Age: 59
End: 2024-01-02
Payer: COMMERCIAL

## 2024-01-02 NOTE — TELEPHONE ENCOUNTER
----- Message from RATNA Mackey sent at 1/2/2024  4:10 PM EST -----  Regarding: RE: MRI results from 11/30/23  Tried calling patient. No answer and no option for voicemail.    ----- Message -----  From: Guera Mckeon  Sent: 1/2/2024  11:51 AM EST  To: RATNA Mackey  Subject: MRI results from 11/30/23                        Patient left a message for MRI results, done 11/30/23,  in [facility]    [phone]

## 2024-01-08 ENCOUNTER — TELEPHONE (OUTPATIENT)
Dept: NEUROSURGERY | Facility: CLINIC | Age: 59
End: 2024-01-08
Payer: COMMERCIAL

## 2024-01-08 DIAGNOSIS — D32.9 MENINGIOMA (CMS/HCC): ICD-10-CM

## 2024-01-08 DIAGNOSIS — M47.816 LUMBAR SPONDYLOSIS: Primary | ICD-10-CM

## 2024-01-08 DIAGNOSIS — D36.10 SCHWANNOMA: ICD-10-CM

## 2024-01-08 NOTE — PROGRESS NOTES
I called patient again to discuss neuroaxis imaging. There was no answer and no option to leave a voicemail. She will need repeat lumbar and brain MRI with and without contrast in 3 months.    No

## 2024-01-08 NOTE — TELEPHONE ENCOUNTER
----- Message from RATNA Mackey sent at 1/8/2024 12:30 PM EST -----  Regarding: RE: MRI results from 11/30/23  I called again and no answer. She will need brain and lumbar MRI in 3 months. They are ordered.    ----- Message -----  From: Guera Mckeon  Sent: 1/2/2024  11:51 AM EST  To: RATNA Mackey  Subject: MRI results from 11/30/23                        Patient left a message for MRI results, done 11/30/23,  in [facility]    [phone]

## 2024-02-14 ENCOUNTER — OFFICE VISIT (OUTPATIENT)
Dept: FAMILY MEDICINE | Facility: CLINIC | Age: 59
End: 2024-02-14
Payer: COMMERCIAL

## 2024-02-14 VITALS
BODY MASS INDEX: 44.41 KG/M2 | DIASTOLIC BLOOD PRESSURE: 80 MMHG | WEIGHT: 293 LBS | SYSTOLIC BLOOD PRESSURE: 140 MMHG | OXYGEN SATURATION: 98 % | TEMPERATURE: 98 F | RESPIRATION RATE: 16 BRPM | HEIGHT: 68 IN | HEART RATE: 75 BPM

## 2024-02-14 DIAGNOSIS — F43.10 PTSD (POST-TRAUMATIC STRESS DISORDER): ICD-10-CM

## 2024-02-14 DIAGNOSIS — N32.81 OVERACTIVE BLADDER: ICD-10-CM

## 2024-02-14 DIAGNOSIS — E78.00 HYPERCHOLESTEROLEMIA: ICD-10-CM

## 2024-02-14 DIAGNOSIS — G89.4 CHRONIC PAIN SYNDROME: ICD-10-CM

## 2024-02-14 DIAGNOSIS — D36.10 SCHWANNOMA: ICD-10-CM

## 2024-02-14 DIAGNOSIS — F41.9 ANXIETY: ICD-10-CM

## 2024-02-14 DIAGNOSIS — R73.03 PREDIABETES: ICD-10-CM

## 2024-02-14 DIAGNOSIS — F20.9 SCHIZOPHRENIA, UNSPECIFIED TYPE: ICD-10-CM

## 2024-02-14 DIAGNOSIS — F19.11 HISTORY OF SUBSTANCE ABUSE (CMS/HCC): ICD-10-CM

## 2024-02-14 DIAGNOSIS — K52.9 CHRONIC DIARRHEA: Primary | ICD-10-CM

## 2024-02-14 DIAGNOSIS — I10 ESSENTIAL HYPERTENSION: ICD-10-CM

## 2024-02-14 DIAGNOSIS — F33.9 EPISODE OF RECURRENT MAJOR DEPRESSIVE DISORDER, UNSPECIFIED DEPRESSION EPISODE SEVERITY (CMS/HCC): ICD-10-CM

## 2024-02-14 DIAGNOSIS — K21.9 GASTROESOPHAGEAL REFLUX DISEASE WITHOUT ESOPHAGITIS: ICD-10-CM

## 2024-02-14 DIAGNOSIS — F31.10 BIPOLAR AFFECTIVE DISORDER, CURRENT EPISODE MANIC, CURRENT EPISODE SEVERITY UNSPECIFIED (CMS/HCC): ICD-10-CM

## 2024-02-14 PROBLEM — E66.01 CLASS 3 SEVERE OBESITY DUE TO EXCESS CALORIES WITH SERIOUS COMORBIDITY AND BODY MASS INDEX (BMI) OF 40.0 TO 44.9 IN ADULT (CMS/HCC): Status: RESOLVED | Noted: 2017-12-04 | Resolved: 2024-02-14

## 2024-02-14 PROBLEM — G95.89: Status: RESOLVED | Noted: 2023-08-17 | Resolved: 2024-02-14

## 2024-02-14 PROBLEM — E66.813 CLASS 3 SEVERE OBESITY DUE TO EXCESS CALORIES WITH SERIOUS COMORBIDITY AND BODY MASS INDEX (BMI) OF 40.0 TO 44.9 IN ADULT (CMS/HCC): Status: RESOLVED | Noted: 2017-12-04 | Resolved: 2024-02-14

## 2024-02-14 PROCEDURE — 3008F BODY MASS INDEX DOCD: CPT | Performed by: FAMILY MEDICINE

## 2024-02-14 PROCEDURE — 99214 OFFICE O/P EST MOD 30 MIN: CPT | Performed by: FAMILY MEDICINE

## 2024-02-14 PROCEDURE — 3077F SYST BP >= 140 MM HG: CPT | Performed by: FAMILY MEDICINE

## 2024-02-14 PROCEDURE — 3079F DIAST BP 80-89 MM HG: CPT | Performed by: FAMILY MEDICINE

## 2024-02-14 RX ORDER — LOSARTAN POTASSIUM 100 MG/1
100 TABLET ORAL DAILY
Qty: 90 TABLET | Refills: 0
Start: 2024-02-14 | End: 2024-04-23

## 2024-02-14 RX ORDER — MELOXICAM 15 MG/1
15 TABLET ORAL DAILY
Qty: 30 TABLET | Refills: 2 | Status: SHIPPED | OUTPATIENT
Start: 2024-02-14 | End: 2024-04-23

## 2024-02-14 RX ORDER — IBUPROFEN 400 MG/1
TABLET ORAL
COMMUNITY
Start: 2024-01-17 | End: 2024-02-14

## 2024-02-14 RX ORDER — BUPROPION HYDROCHLORIDE 150 MG/1
150 TABLET ORAL DAILY
Start: 2024-02-14 | End: 2024-08-13 | Stop reason: SDUPTHER

## 2024-02-14 RX ORDER — OMEPRAZOLE 20 MG/1
20 CAPSULE, DELAYED RELEASE ORAL
Qty: 30 CAPSULE | Refills: 1
Start: 2024-02-14 | End: 2024-02-22

## 2024-02-14 RX ORDER — ATORVASTATIN CALCIUM 10 MG/1
10 TABLET, FILM COATED ORAL NIGHTLY
Qty: 30 TABLET | Refills: 1
Start: 2024-02-14 | End: 2024-02-22

## 2024-02-14 RX ORDER — DULOXETIN HYDROCHLORIDE 60 MG/1
60 CAPSULE, DELAYED RELEASE ORAL DAILY
Start: 2024-02-14 | End: 2024-08-13 | Stop reason: SDUPTHER

## 2024-02-14 RX ORDER — QUETIAPINE FUMARATE 400 MG/1
400 TABLET, FILM COATED ORAL NIGHTLY
Start: 2024-02-14 | End: 2024-08-13 | Stop reason: SDUPTHER

## 2024-02-14 RX ORDER — MIRABEGRON 50 MG/1
50 TABLET, FILM COATED, EXTENDED RELEASE ORAL DAILY
Start: 2024-02-14 | End: 2024-08-14 | Stop reason: SDUPTHER

## 2024-02-14 ASSESSMENT — ENCOUNTER SYMPTOMS: CONSTITUTIONAL NEGATIVE: 1

## 2024-02-14 NOTE — ASSESSMENT & PLAN NOTE
MRI of the lumbar spine in August, 2023 showed 5 mm mass in the right aspect of the thecal sac at L1 level which may represent a schwannoma.  Follow-up MRI with IV contrast done on 8/31/2023 which showed unchanged size of nodule along the right cauda equina nerve roots at L1.  She was referred to neurosurgeon.  MRI of the thoracic spine and brain were done on 11/30/2023.  We demonstrated a 7 mm circumcised lesion along the right side of the cauda equina at the level of L1.  Also found 0.5 x 0.4 x 0.4 cm homogeneously enhancing lesion along the right internal auditory canal likely representing a vestibular schwannoma.  3-month follow-up MRI of the brain and lumbar spine were ordered.

## 2024-02-14 NOTE — PROGRESS NOTES
Daily Progress Note       Patient ID: Kar Nicholas is a 58 y.o. female.    HPI    58-year-old female presents for hospital follow-up.     Reports decreased hearing.  Also reports vertigo symptoms at times.       Hx of Schizophrenia, Bipolar - Manic, PTSD and Anxiety - has been following up with Psychiatry.  Currently on Wellbutrin XL, Cymbalta, Seroquel     Essential Hypertension - currently on losartan 25 mg daily.      Hypercholesterolemia - currently on atorvastatin 10 mg qhs - tolerating with no complaints.      GERD - currently on omeprazole 20 mg daily.  Attempted to switch to H2 blocker but had worsening of symptoms.      Vitamin D Deficiency      OAB - following up with Urology.  Currently on Myrbetriq 50 mg daily.      MRI of the lumbar spine in August, 2023 showed 5 mm mass in the right aspect of the thecal sac at L1 level which may represent a schwannoma.  Follow-up MRI with IV contrast done on 8/31/2023 which showed unchanged size of nodule along the right cauda equina nerve roots at L1.  She was referred to neurosurgeon.  MRI of the thoracic spine and brain were done on 11/30/2023.  We demonstrated a 7 mm circumcised lesion along the right side of the cauda equina at the level of L1.  Also found 0.5 x 0.4 x 0.4 cm homogeneously enhancing lesion along the right internal auditory canal likely representing a vestibular schwannoma.  3-month follow-up MRI of the brain and lumbar spine were ordered.     She continues to have symptoms of chronic diarrhea.  Had a colonoscopy on 12/13/2022 which showed no cause.    Chronic pain syndrome - seen by pain management.  Discussed opiates and gabapentin.  She does not want to take these type of medications.  Open for use of NSAIDs.     The following have been reviewed and updated as appropriate in this visit:   Allergies  Meds  Problems       Review of Systems   Constitutional: Negative.              Current Outpatient Medications   Medication Sig Dispense  Refill   • atorvastatin (LIPITOR) 10 mg tablet Take 1 tablet (10 mg total) by mouth nightly. 30 tablet 1   • buPROPion XL (WELLBUTRIN XL) 150 mg 24 hr tablet Take 1 tablet (150 mg total) by mouth daily.     • cloNIDine (CATAPRES) 0.1 mg tablet      • DULoxetine (CYMBALTA) 60 mg capsule Take 1 capsule (60 mg total) by mouth daily.     • hydrALAZINE (APRESOLINE) 50 mg tablet      • losartan (COZAAR) 100 mg tablet Take 1 tablet (100 mg total) by mouth daily. 90 tablet 0   • meloxicam (MOBIC) 15 mg tablet Take 1 tablet (15 mg total) by mouth daily. 30 tablet 2   • MYRBETRIQ 50 mg ER tablet Take 1 tablet (50 mg total) by mouth daily.     • nalTREXone (DEPADE) 50 mg tablet 1 tablet (50 mg total) daily.     • omeprazole (PriLOSEC) 20 mg capsule Take 1 capsule (20 mg total) by mouth daily before breakfast. 30 capsule 1   • QUEtiapine (SEROquel) 400 mg tablet Take 1 tablet (400 mg total) by mouth nightly.       No current facility-administered medications for this visit.     Past Medical History:   Diagnosis Date   • Alcoholism (CMS/[facility])     In Recovery  Since October 2016    • Anxiety    • Asthma     Remote   • Bipolar affect, depressed (CMS/HCC)    • Body mass index (BMI) 45.0-49.9, adult    • Borderline type 2 diabetes mellitus 4/15/2019   • Cocaine addiction (CMS/HCC)      None since November 2016   • Delayed emergence from general anesthesia    • Depression    • GERD (gastroesophageal reflux disease)    • History of snoring    • Hypertension    • Joint pain    • Lipid disorder    • Migraines    • Morbid obesity with BMI of 45.0-49.9, adult (CMS/HCC)    • Osteoarthritis     Knees   • Post-menopause    • Schizophrenia (CMS/HCC)    • Sciatica    • Sleep apnea     Uses CPAP occas- To bring CPAP Day of Sx   • Urinary incontinence      Family History   Problem Relation Age of Onset   • Colon cancer Biological Mother    • Lung cancer Biological Father    • Heart failure Biological Brother    • Heart block Biological Daughter   "  • Breast cancer Other    • Breast cancer Cousin      Past Surgical History:   Procedure Laterality Date   • CHOLECYSTECTOMY  2018   • COLONOSCOPY     • HYSTERECTOMY  2010    PARTIAL   • JOINT REPLACEMENT      right knee   • KNEE SURGERY Right    • REDUCTION MAMMAPLASTY Bilateral    • WISDOM TOOTH EXTRACTION       Social History     Tobacco Use   • Smoking status: Some Days     Packs/day: 0.25     Years: 30.00     Additional pack years: 0.00     Total pack years: 7.50     Types: Cigarettes   • Smokeless tobacco: Never   • Tobacco comments:     3 cigarettes a week   Vaping Use   • Vaping Use: Never used   Substance Use Topics   • Alcohol use: Not Currently     Comment: Recovering Alcoholic since 10/2016   • Drug use: No     Types: Cocaine     Comment: None since 11/2016     Allergies   Allergen Reactions   • Shellfish Containing Products Angioedema     Other reaction(s): shellfish       Objective   No new labs.    Vitals:    02/14/24 1047   BP: 140/80   BP Location: Left upper arm   Patient Position: Sitting   Pulse: 75   Resp: 16   Temp: 36.7 °C (98 °F)   TempSrc: Temporal   SpO2: 98%   Weight: 135 kg (298 lb)   Height: 1.727 m (5' 8\")         Physical Exam  Vitals and nursing note reviewed.   Constitutional:       Appearance: Normal appearance. She is well-developed.   HENT:      Head: Normocephalic and atraumatic.   Eyes:      Extraocular Movements: Extraocular movements intact.      Conjunctiva/sclera: Conjunctivae normal.   Pulmonary:      Effort: Pulmonary effort is normal.   Musculoskeletal:         General: Normal range of motion.      Cervical back: Normal range of motion.   Neurological:      General: No focal deficit present.      Mental Status: She is alert and oriented to person, place, and time.   Psychiatric:         Mood and Affect: Mood normal.         Behavior: Behavior normal.         Thought Content: Thought content normal.         Judgment: Judgment normal.         Assessment/Plan   Problem List " Items Addressed This Visit        Nervous    Chronic pain syndrome    Relevant Medications    meloxicam (MOBIC) 15 mg tablet       Circulatory    Essential hypertension    Current Assessment & Plan     Continue on current regimen          Relevant Medications    losartan (COZAAR) 100 mg tablet    Other Relevant Orders    CBC and Differential    Comprehensive metabolic panel    Lipid panel    Microalbumin/Creatinine Ur Random    TSH w reflex FT4    Urinalysis with microscopic       Digestive    Gastroesophageal reflux disease    Relevant Medications    omeprazole (PriLOSEC) 20 mg capsule    Chronic diarrhea - Primary    Current Assessment & Plan     Will get stool studies for further evaluation          Relevant Orders    Fecal leukocytes    Stool culture    Ova and parasite screen    C. difficile Toxin Immunoassay    Calprotectin, Fecal    Pancreatic Elastase, Fecal       Genitourinary    Overactive bladder    Current Assessment & Plan     Continue follow up with Urology.  Continue with Myrbetriq         Relevant Medications    MYRBETRIQ 50 mg ER tablet       Endocrine/Metabolic    Prediabetes    Current Assessment & Plan     Recommend low carb diet, moderate exercise and weight loss. Decrease intake of bread, rice, pasta, starches, juice, soda and sweets.         Relevant Orders    Comprehensive metabolic panel    Hemoglobin A1c    Microalbumin/Creatinine Ur Random       Mental Health    Depression    Current Assessment & Plan     Continue follow up with Psychiatry         Relevant Medications    buPROPion XL (WELLBUTRIN XL) 150 mg 24 hr tablet    DULoxetine (CYMBALTA) 60 mg capsule    QUEtiapine (SEROquel) 400 mg tablet    Anxiety    Current Assessment & Plan     Continue follow up with Psychiatry         Relevant Medications    buPROPion XL (WELLBUTRIN XL) 150 mg 24 hr tablet    DULoxetine (CYMBALTA) 60 mg capsule    QUEtiapine (SEROquel) 400 mg tablet    Bipolar affective disorder, current episode manic  (CMS/HCC)    Current Assessment & Plan     Continue follow up with Psychiatry         Relevant Medications    buPROPion XL (WELLBUTRIN XL) 150 mg 24 hr tablet    DULoxetine (CYMBALTA) 60 mg capsule    QUEtiapine (SEROquel) 400 mg tablet    PTSD (post-traumatic stress disorder)    Current Assessment & Plan     Continue follow up with Psychiatry         Relevant Medications    buPROPion XL (WELLBUTRIN XL) 150 mg 24 hr tablet    DULoxetine (CYMBALTA) 60 mg capsule    QUEtiapine (SEROquel) 400 mg tablet    Schizophrenia (CMS/HCC)    Current Assessment & Plan     Continue follow up with Psychiatry         Relevant Medications    buPROPion XL (WELLBUTRIN XL) 150 mg 24 hr tablet    DULoxetine (CYMBALTA) 60 mg capsule    QUEtiapine (SEROquel) 400 mg tablet    History of substance abuse (CMS/HCC)       Other    Hypercholesterolemia    Current Assessment & Plan     Continue on statin therapy          Relevant Medications    atorvastatin (LIPITOR) 10 mg tablet    Other Relevant Orders    Comprehensive metabolic panel    Lipid panel    TSH w reflex FT4    Schwannoma    Current Assessment & Plan     MRI of the lumbar spine in August, 2023 showed 5 mm mass in the right aspect of the thecal sac at L1 level which may represent a schwannoma.  Follow-up MRI with IV contrast done on 8/31/2023 which showed unchanged size of nodule along the right cauda equina nerve roots at L1.  She was referred to neurosurgeon.  MRI of the thoracic spine and brain were done on 11/30/2023.  We demonstrated a 7 mm circumcised lesion along the right side of the cauda equina at the level of L1.  Also found 0.5 x 0.4 x 0.4 cm homogeneously enhancing lesion along the right internal auditory canal likely representing a vestibular schwannoma.  3-month follow-up MRI of the brain and lumbar spine were ordered.            Prosper Grullon MD  2/14/2024

## 2024-02-15 ENCOUNTER — TELEPHONE (OUTPATIENT)
Dept: NEUROSURGERY | Facility: CLINIC | Age: 59
End: 2024-02-15
Payer: COMMERCIAL

## 2024-02-15 NOTE — TELEPHONE ENCOUNTER
MRI Brain and MRI L spine approved for [facility] thru 3/16/24    [facility]- [phone] ([employer])  Same auth for both, auth#: [id number]    Spoke to patient, gave auth and scheduling info. Patient will call us back to make appointment or we can call her with the results.

## 2024-02-19 ENCOUNTER — TELEPHONE (OUTPATIENT)
Dept: FAMILY MEDICINE | Facility: CLINIC | Age: 59
End: 2024-02-19
Payer: COMMERCIAL

## 2024-02-19 DIAGNOSIS — Z12.31 SCREENING MAMMOGRAM FOR HIGH-RISK PATIENT: Primary | ICD-10-CM

## 2024-02-19 NOTE — TELEPHONE ENCOUNTER
I never advised or told her to discontinue the Wellbutrin.  I discussed that she needed to speak to her psychiatrist for any change

## 2024-02-19 NOTE — TELEPHONE ENCOUNTER
Patient called to speak with someone from the office regarding a sample requested by Dr Grullon. She states that the instructions aren't clear ans would like to speak with someone to further explain. Please give the patient a callback to advise.

## 2024-02-19 NOTE — TELEPHONE ENCOUNTER
Pt called stating she needs a letter from Dr. Grullon stating that she is no longer taking Wellbutrin (as of 2/9 per pt) so she can provide this documentation to her new Psychiatrist.     Pt would like to pick this letter up at the  when ready. She has difficulty signing into the portal.     C: [phone]    Test Order Request   Patient PCP: Prosper Grullon MD  Next Office Visit: 5/14/2024  Preferred Lab: [facility]  [address] [place] [id number]  Tests Requested:  Screening Mammogram  , MyChart, or US Mail?: Would like to  w/ the above letter from Dr Grullon    The practice will reach out to discuss your request within 2 business days.

## 2024-02-19 NOTE — TELEPHONE ENCOUNTER
Spoke with patient, she is aware Mammogram order is in the system. Patient was given direction on how to complete stool kit

## 2024-02-19 NOTE — TELEPHONE ENCOUNTER
Patient reports she knows you didn't tell her to stop taking the medication.  She decided on her own to stop it.  She said she needs a note stating that she stopped taking it on her own

## 2024-02-20 ENCOUNTER — TELEPHONE (OUTPATIENT)
Dept: FAMILY MEDICINE | Facility: CLINIC | Age: 59
End: 2024-02-20
Payer: COMMERCIAL

## 2024-02-20 NOTE — TELEPHONE ENCOUNTER
Request for Encounter, Labs, or Testing Results to be sent to Provider  Patient PCP: Prosper Grullon MD  Receiving Provider Name: Dr. Hien Mills  Receiving Provider Phone Number: [phone]  Receiving Provider Fax Number: [phone]  Encounter, Labs, or Testing Results?: Needs a summary of care printout from last visit, and a list of previous dx and tx if that can be provided   Date of Service: just general information is needed        The practice will send your records to the  provider as requested within 2 business days.

## 2024-02-20 NOTE — TELEPHONE ENCOUNTER
Patient called back - following up letter about stopping Wellburtin on her own.    PH: [phone]    Luli PATEL

## 2024-02-20 NOTE — TELEPHONE ENCOUNTER
Asking for call back regarding status of note stating she stopped taking the medication on her own.

## 2024-02-22 ENCOUNTER — HOSPITAL ENCOUNTER (OUTPATIENT)
Dept: RADIOLOGY | Age: 59
Discharge: HOME | End: 2024-02-22
Attending: FAMILY MEDICINE
Payer: COMMERCIAL

## 2024-02-22 DIAGNOSIS — Z12.31 SCREENING MAMMOGRAM FOR HIGH-RISK PATIENT: ICD-10-CM

## 2024-02-22 DIAGNOSIS — K21.9 GASTROESOPHAGEAL REFLUX DISEASE WITHOUT ESOPHAGITIS: ICD-10-CM

## 2024-02-22 DIAGNOSIS — E78.00 HYPERCHOLESTEROLEMIA: ICD-10-CM

## 2024-02-22 PROCEDURE — 77067 SCR MAMMO BI INCL CAD: CPT

## 2024-02-22 RX ORDER — OMEPRAZOLE 20 MG/1
20 CAPSULE, DELAYED RELEASE ORAL
Qty: 30 CAPSULE | Refills: 1 | Status: SHIPPED | OUTPATIENT
Start: 2024-02-22 | End: 2024-04-23

## 2024-02-22 RX ORDER — ATORVASTATIN CALCIUM 10 MG/1
10 TABLET, FILM COATED ORAL NIGHTLY
Qty: 30 TABLET | Refills: 1 | Status: SHIPPED | OUTPATIENT
Start: 2024-02-22 | End: 2024-04-23

## 2024-03-11 ENCOUNTER — HOSPITAL ENCOUNTER (OUTPATIENT)
Dept: RADIOLOGY | Facility: HOSPITAL | Age: 59
Discharge: HOME | End: 2024-03-11
Attending: PHYSICIAN ASSISTANT
Payer: COMMERCIAL

## 2024-03-11 ENCOUNTER — HOSPITAL ENCOUNTER (OUTPATIENT)
Dept: RADIOLOGY | Facility: HOSPITAL | Age: 59
Discharge: HOME | End: 2024-03-11
Attending: NEUROLOGICAL SURGERY
Payer: COMMERCIAL

## 2024-03-11 DIAGNOSIS — M47.816 LUMBAR SPONDYLOSIS: ICD-10-CM

## 2024-03-11 DIAGNOSIS — D36.10 SCHWANNOMA: ICD-10-CM

## 2024-03-11 RX ORDER — GADOBUTROL 604.72 MG/ML
0.1 INJECTION INTRAVENOUS ONCE
Status: DISCONTINUED | OUTPATIENT
Start: 2024-03-11 | End: 2024-03-11

## 2024-03-11 RX ORDER — GADOBUTROL 604.72 MG/ML
0.1 INJECTION INTRAVENOUS ONCE
Status: COMPLETED | OUTPATIENT
Start: 2024-03-11 | End: 2024-03-11

## 2024-03-11 RX ADMIN — GADOBUTROL 13.5 ML: 604.72 INJECTION INTRAVENOUS at 11:56

## 2024-03-12 DIAGNOSIS — D36.10 SCHWANNOMA: ICD-10-CM

## 2024-03-12 DIAGNOSIS — D33.3 VESTIBULAR SCHWANNOMA (CMS/HCC): ICD-10-CM

## 2024-03-12 DIAGNOSIS — M47.816 LUMBAR SPONDYLOSIS: Primary | ICD-10-CM

## 2024-03-12 NOTE — PROGRESS NOTES
Patient had brain MRI completed showing a right-sided vestibular schwannoma measuring 7 mm when previously measuring around 6 mm in November.  She does not have any dizziness, ringing or ears or hearing loss.  She does not have any headaches.  This is likely an incidental finding and there is no acute neurosurgical intervention.  We would like to follow this and will plan to get a repeat MRI in 6 months.    She also had lumbar MRI completed showing overall stable 7 mm L1 schwannoma.  She is not having any correlating radicular pain.  Again, this is an incidental finding and we would like to repeat the MRI in about 6 months which will give us 1 year interval studies.    I explained results to the patient she appreciated the phone call.  She will call with additional questions or concerns.

## 2024-04-19 ENCOUNTER — TELEPHONE (OUTPATIENT)
Dept: FAMILY MEDICINE | Facility: CLINIC | Age: 59
End: 2024-04-19
Payer: COMMERCIAL

## 2024-04-19 DIAGNOSIS — R29.898 WEAKNESS OF RIGHT LOWER EXTREMITY: Primary | ICD-10-CM

## 2024-04-19 NOTE — TELEPHONE ENCOUNTER
Patient called needing a new PT order because her current one . She goes to [facility] in [place] and asked if someone could fax it over.     Lindy ROD Fax#: [phone]  [facility] Ph: [phone]

## 2024-04-23 ENCOUNTER — TELEPHONE (OUTPATIENT)
Dept: FAMILY MEDICINE | Facility: CLINIC | Age: 59
End: 2024-04-23
Payer: COMMERCIAL

## 2024-04-23 DIAGNOSIS — G89.4 CHRONIC PAIN SYNDROME: ICD-10-CM

## 2024-04-23 DIAGNOSIS — I10 ESSENTIAL HYPERTENSION: ICD-10-CM

## 2024-04-23 DIAGNOSIS — E78.00 HYPERCHOLESTEROLEMIA: ICD-10-CM

## 2024-04-23 DIAGNOSIS — K21.9 GASTROESOPHAGEAL REFLUX DISEASE WITHOUT ESOPHAGITIS: ICD-10-CM

## 2024-04-23 RX ORDER — ATORVASTATIN CALCIUM 10 MG/1
10 TABLET, FILM COATED ORAL NIGHTLY
Qty: 90 TABLET | Refills: 0 | Status: SHIPPED | OUTPATIENT
Start: 2024-04-23 | End: 2024-07-10

## 2024-04-23 RX ORDER — OMEPRAZOLE 20 MG/1
20 CAPSULE, DELAYED RELEASE ORAL
Qty: 90 CAPSULE | Refills: 0 | Status: SHIPPED | OUTPATIENT
Start: 2024-04-23 | End: 2024-07-10

## 2024-04-23 RX ORDER — LOSARTAN POTASSIUM 100 MG/1
100 TABLET ORAL DAILY
Qty: 90 TABLET | Refills: 1 | Status: SHIPPED | OUTPATIENT
Start: 2024-04-23 | End: 2024-08-13 | Stop reason: SDUPTHER

## 2024-04-23 RX ORDER — MELOXICAM 15 MG/1
15 TABLET ORAL DAILY
Qty: 30 TABLET | Refills: 1 | Status: SHIPPED | OUTPATIENT
Start: 2024-04-23 | End: 2024-05-16

## 2024-04-23 NOTE — TELEPHONE ENCOUNTER
Pt called today stating PE never received new script to continue PE and could refax fax number is [phone]

## 2024-05-02 ENCOUNTER — TELEPHONE (OUTPATIENT)
Dept: FAMILY MEDICINE | Facility: CLINIC | Age: 59
End: 2024-05-02

## 2024-05-03 NOTE — TELEPHONE ENCOUNTER
Spoke with patient, stating she would like a Rx for percocet 5mg to hold her until she see pain management, Patient is aware that she would have to see pain management for her RX.

## 2024-05-16 DIAGNOSIS — G89.4 CHRONIC PAIN SYNDROME: ICD-10-CM

## 2024-05-16 RX ORDER — MELOXICAM 15 MG/1
15 TABLET ORAL DAILY
Qty: 30 TABLET | Refills: 0 | Status: SHIPPED | OUTPATIENT
Start: 2024-05-16 | End: 2024-07-10

## 2024-06-11 DIAGNOSIS — G89.4 CHRONIC PAIN SYNDROME: ICD-10-CM

## 2024-06-11 RX ORDER — MELOXICAM 15 MG/1
15 TABLET ORAL DAILY
Qty: 30 TABLET | Refills: 0 | OUTPATIENT
Start: 2024-06-11

## 2024-07-09 DIAGNOSIS — K21.9 GASTROESOPHAGEAL REFLUX DISEASE WITHOUT ESOPHAGITIS: ICD-10-CM

## 2024-07-09 DIAGNOSIS — G89.4 CHRONIC PAIN SYNDROME: ICD-10-CM

## 2024-07-09 DIAGNOSIS — E78.00 HYPERCHOLESTEROLEMIA: ICD-10-CM

## 2024-07-10 RX ORDER — ATORVASTATIN CALCIUM 10 MG/1
10 TABLET, FILM COATED ORAL NIGHTLY
Qty: 90 TABLET | Refills: 1 | Status: SHIPPED | OUTPATIENT
Start: 2024-07-10 | End: 2024-08-13 | Stop reason: SDUPTHER

## 2024-07-10 RX ORDER — OMEPRAZOLE 20 MG/1
20 CAPSULE, DELAYED RELEASE ORAL
Qty: 90 CAPSULE | Refills: 1 | Status: SHIPPED | OUTPATIENT
Start: 2024-07-10 | End: 2024-08-13 | Stop reason: SDUPTHER

## 2024-07-10 RX ORDER — MELOXICAM 15 MG/1
15 TABLET ORAL DAILY
Qty: 30 TABLET | Refills: 0 | Status: SHIPPED | OUTPATIENT
Start: 2024-07-10 | End: 2024-08-06

## 2024-08-13 RX ORDER — ATORVASTATIN CALCIUM 10 MG/1
10 TABLET, FILM COATED ORAL NIGHTLY
Start: 2024-08-13 | End: 2025-02-06

## 2024-08-13 RX ORDER — MELOXICAM 15 MG/1
15 TABLET ORAL DAILY
Start: 2024-08-13 | End: 2024-09-04

## 2024-08-13 RX ORDER — LOSARTAN POTASSIUM 100 MG/1
100 TABLET ORAL DAILY
Start: 2024-08-13 | End: 2024-10-07

## 2024-08-13 RX ORDER — BUPROPION HYDROCHLORIDE 150 MG/1
150 TABLET ORAL DAILY
Start: 2024-08-13 | End: 2024-11-01 | Stop reason: ALTCHOICE

## 2024-08-13 RX ORDER — ATORVASTATIN CALCIUM 10 MG/1
10 TABLET, FILM COATED ORAL NIGHTLY
Start: 2024-08-13 | End: 2024-08-13 | Stop reason: SDUPTHER

## 2024-08-13 RX ORDER — QUETIAPINE FUMARATE 400 MG/1
400 TABLET, FILM COATED ORAL NIGHTLY
Start: 2024-08-13 | End: 2024-11-01 | Stop reason: ALTCHOICE

## 2024-08-13 RX ORDER — NALTREXONE HYDROCHLORIDE 50 MG/1
50 TABLET, FILM COATED ORAL DAILY
Start: 2024-08-13 | End: 2024-08-14 | Stop reason: SDUPTHER

## 2024-08-13 RX ORDER — DULOXETIN HYDROCHLORIDE 60 MG/1
60 CAPSULE, DELAYED RELEASE ORAL DAILY
Start: 2024-08-13

## 2024-08-13 RX ORDER — OMEPRAZOLE 20 MG/1
20 CAPSULE, DELAYED RELEASE ORAL
Start: 2024-08-13 | End: 2025-02-06

## 2024-08-13 NOTE — PROGRESS NOTES
Daily Progress Note       Patient ID: Kar Nicholas is a 59 y.o. female.    HPI    59-year-old female presents for hospital follow-up.     Reports decreased hearing.  Also reports vertigo symptoms at times.       Hx of Schizophrenia, Bipolar - Manic, PTSD and Anxiety - has been following up with Psychiatry.  Currently on Wellbutrin XL, Cymbalta, Seroquel      Essential Hypertension - currently on losartan 25 mg daily.      Hypercholesterolemia - currently on atorvastatin 10 mg qhs - tolerating with no complaints.      GERD - currently on omeprazole 20 mg daily.  Attempted to switch to H2 blocker but had worsening of symptoms.      Vitamin D Deficiency      OAB - following up with Urology.  Currently on Myrbetriq 50 mg daily.       MRI of the lumbar spine in August, 2023 showed 5 mm mass in the right aspect of the thecal sac at L1 level which may represent a schwannoma.  Follow-up MRI with IV contrast done on 8/31/2023 which showed unchanged size of nodule along the right cauda equina nerve roots at L1.  She was referred to neurosurgeon.  MRI of the thoracic spine and brain were done on 11/30/2023.  Demonstrated a 7 mm circumcised lesion along the right side of the cauda equina at the level of L1.  Also found 0.5 x 0.4 x 0.4 cm homogeneously enhancing lesion along the right internal auditory canal likely representing a vestibular schwannoma.  Follow-up MRIs were done on 3/11/2024.  MRI of the brain showed stable to minimally increased in size of a small enhancing mass in the right internal auditory canal most likely represent a vestibular schwannoma measuring up to 7 mm, previously 6 mm.  Also showed chronic nonspecific probably microangiopathic moderate pontine and supratentorial mild to moderate white matter disease without significant change.  MRI of lumbar spine showed stable 7 mm intradural extra-axial mass along one of the right cauda equina nerve roots at the level of L1 likely representing a schwannoma.   Recommended repeat in 6 months.      She continues to have symptoms of chronic diarrhea.  Had a colonoscopy on 12/13/2022 which showed no cause.  Reports diarrhea recently has been better.      Chronic pain syndrome - seen by pain management.  Discussed opiates and gabapentin.  She does not want to take these type of medications.  Open for use of NSAIDs.     She reports hx of childhood asthma.  Reports now having symptoms of chest tightness/wheezing mainly at night.     The following have been reviewed and updated as appropriate in this visit:   Allergies  Meds  Problems       Review of Systems   Constitutional: Negative.    Musculoskeletal:  Positive for arthralgias and back pain.             Current Outpatient Medications   Medication Sig Dispense Refill    albuterol HFA (PROAIR HFA) 90 mcg/actuation inhaler Inhale 2 puffs every 6 (six) hours as needed for wheezing or shortness of breath. 18 g 0    atorvastatin (LIPITOR) 10 mg tablet Take 1 tablet (10 mg total) by mouth nightly.      buPROPion XL (WELLBUTRIN XL) 150 mg 24 hr tablet Take 1 tablet (150 mg total) by mouth daily.      cloNIDine (CATAPRES) 0.1 mg tablet Take 1 tablet (0.1 mg total) by mouth 2 (two) times a day.      DULoxetine (CYMBALTA) 60 mg capsule Take 1 capsule (60 mg total) by mouth daily.      losartan (COZAAR) 100 mg tablet Take 1 tablet (100 mg total) by mouth daily.      meloxicam (MOBIC) 15 mg tablet Take 1 tablet (15 mg total) by mouth daily.      MYRBETRIQ 50 mg ER tablet Take 1 tablet (50 mg total) by mouth daily.      nalTREXone (DEPADE) 50 mg tablet Take 1 tablet (50 mg total) by mouth daily.      omeprazole (PriLOSEC) 20 mg capsule Take 1 capsule (20 mg total) by mouth daily before breakfast.      QUEtiapine (SEROquel) 400 mg tablet Take 1 tablet (400 mg total) by mouth nightly.       No current facility-administered medications for this visit.     Past Medical History:   Diagnosis Date    Alcoholism (CMS/[facility])     In Recovery  Since  October 2016     Anxiety     Asthma     Remote    Bipolar affect, depressed (CMS/[facility])     Body mass index (BMI) 45.0-49.9, adult     Borderline type 2 diabetes mellitus 4/15/2019    Cocaine addiction (CMS/[facility])      None since November 2016    Delayed emergence from general anesthesia     Depression     GERD (gastroesophageal reflux disease)     History of snoring     Hypertension     Joint pain     Lipid disorder     Migraines     Morbid obesity with BMI of 45.0-49.9, adult (CMS/[facility])     Osteoarthritis     Knees    Post-menopause     Schizophrenia (CMS/[facility])     Sciatica     Sleep apnea     Uses CPAP occas- To bring CPAP Day of Sx    Urinary incontinence      Family History   Problem Relation Age of Onset    Colon cancer Biological Mother     Lung cancer Biological Father     Heart failure Biological Brother     Heart block Biological Daughter     Breast cancer Other     Breast cancer Cousin      Past Surgical History:   Procedure Laterality Date    CHOLECYSTECTOMY  2018    COLONOSCOPY      HYSTERECTOMY  2010    PARTIAL    JOINT REPLACEMENT      right knee    KNEE SURGERY Right     REDUCTION MAMMAPLASTY Bilateral     WISDOM TOOTH EXTRACTION       Social History     Tobacco Use    Smoking status: Some Days     Packs/day: 0.25     Years: 30.00     Additional pack years: 0.00     Total pack years: 7.50     Types: Cigarettes    Smokeless tobacco: Never    Tobacco comments:     3 cigarettes a week   Vaping Use    Vaping Use: Never used   Substance Use Topics    Alcohol use: Not Currently     Comment: Recovering Alcoholic since 10/2016    Drug use: No     Types: Cocaine     Comment: None since 11/2016     Allergies   Allergen Reactions    Shellfish Containing Products Angioedema     Other reaction(s): shellfish       Objective   No new labs.    Vitals:    08/14/24 1012   BP: 130/80   BP Location: Left upper arm   Patient Position: Sitting   Pulse: 78   Resp: 16   Temp: 36.6 °C (97.8 °F)   TempSrc: Temporal   SpO2: 98%  "  Weight: (!) 137 kg (303 lb)   Height: 1.727 m (5' 8\")         Physical Exam  Vitals and nursing note reviewed.   Constitutional:       Appearance: Normal appearance. She is well-developed.   HENT:      Head: Normocephalic and atraumatic.   Eyes:      Extraocular Movements: Extraocular movements intact.      Conjunctiva/sclera: Conjunctivae normal.   Pulmonary:      Effort: Pulmonary effort is normal.   Musculoskeletal:         General: Normal range of motion.      Cervical back: Normal range of motion.   Neurological:      General: No focal deficit present.      Mental Status: She is alert and oriented to person, place, and time.   Psychiatric:         Mood and Affect: Mood normal.         Behavior: Behavior normal.         Thought Content: Thought content normal.         Judgment: Judgment normal.         Assessment/Plan   Problem List Items Addressed This Visit       Gastroesophageal reflux disease    Current Assessment & Plan     Unable to tolerate switch from PPI to H2 blocker.  On lower dose PPI          Relevant Medications    omeprazole (PriLOSEC) 20 mg capsule    Hypercholesterolemia    Current Assessment & Plan     Continue on statin therapy          Relevant Medications    atorvastatin (LIPITOR) 10 mg tablet    Other Relevant Orders    CBC and Differential    Comprehensive metabolic panel    Lipid panel    TSH w reflex FT4    Essential hypertension    Current Assessment & Plan     Continue on current regimen          Relevant Medications    losartan (COZAAR) 100 mg tablet    cloNIDine (CATAPRES) 0.1 mg tablet    Other Relevant Orders    CBC and Differential    Comprehensive metabolic panel    Lipid panel    Microalbumin/Creatinine Ur Random    TSH w reflex FT4    Urinalysis with microscopic    Depression    Current Assessment & Plan     Continue follow up with Psychiatry         Relevant Medications    QUEtiapine (SEROquel) 400 mg tablet    DULoxetine (CYMBALTA) 60 mg capsule    buPROPion XL (WELLBUTRIN " XL) 150 mg 24 hr tablet    Anxiety    Current Assessment & Plan     Continue follow up with Psychiatry         Relevant Medications    QUEtiapine (SEROquel) 400 mg tablet    DULoxetine (CYMBALTA) 60 mg capsule    buPROPion XL (WELLBUTRIN XL) 150 mg 24 hr tablet    Other Relevant Orders    CBC and Differential    Bipolar affective disorder, current episode manic (CMS/HCC)    Current Assessment & Plan     Continue follow up with Psychiatry         Relevant Medications    QUEtiapine (SEROquel) 400 mg tablet    DULoxetine (CYMBALTA) 60 mg capsule    buPROPion XL (WELLBUTRIN XL) 150 mg 24 hr tablet    PTSD (post-traumatic stress disorder)    Current Assessment & Plan     Continue follow up with Psychiatry         Relevant Medications    QUEtiapine (SEROquel) 400 mg tablet    DULoxetine (CYMBALTA) 60 mg capsule    buPROPion XL (WELLBUTRIN XL) 150 mg 24 hr tablet    Schizophrenia (CMS/HCC)    Current Assessment & Plan     Continue follow up with Psychiatry         Relevant Medications    QUEtiapine (SEROquel) 400 mg tablet    DULoxetine (CYMBALTA) 60 mg capsule    buPROPion XL (WELLBUTRIN XL) 150 mg 24 hr tablet    Overactive bladder    Relevant Medications    MYRBETRIQ 50 mg ER tablet    Other Relevant Orders    Urinalysis with microscopic    Prediabetes - Primary    Current Assessment & Plan     Recommend low carb diet, moderate exercise and weight loss. Decrease intake of bread, rice, pasta, starches, juice, soda and sweets.         Relevant Orders    CBC and Differential    Comprehensive metabolic panel    Hemoglobin A1c    Microalbumin/Creatinine Ur Random    Urinalysis with microscopic    History of substance abuse (CMS/HCC)    Current Assessment & Plan     Continue follow up with psychiatry          Relevant Medications    nalTREXone (DEPADE) 50 mg tablet    Chronic pain syndrome    Current Assessment & Plan     Will refer to Pain Management         Relevant Medications    meloxicam (MOBIC) 15 mg tablet    Other  Relevant Orders    Ambulatory referral to Pain Medicine       Prosper Grullon MD  8/14/2024

## 2024-08-14 ENCOUNTER — OFFICE VISIT (OUTPATIENT)
Dept: FAMILY MEDICINE | Facility: CLINIC | Age: 59
End: 2024-08-14
Payer: COMMERCIAL

## 2024-08-14 VITALS
RESPIRATION RATE: 16 BRPM | BODY MASS INDEX: 44.41 KG/M2 | WEIGHT: 293 LBS | HEART RATE: 78 BPM | OXYGEN SATURATION: 98 % | DIASTOLIC BLOOD PRESSURE: 80 MMHG | SYSTOLIC BLOOD PRESSURE: 130 MMHG | HEIGHT: 68 IN | TEMPERATURE: 97.8 F

## 2024-08-14 DIAGNOSIS — N32.81 OVERACTIVE BLADDER: ICD-10-CM

## 2024-08-14 DIAGNOSIS — F43.10 PTSD (POST-TRAUMATIC STRESS DISORDER): ICD-10-CM

## 2024-08-14 DIAGNOSIS — E78.00 HYPERCHOLESTEROLEMIA: ICD-10-CM

## 2024-08-14 DIAGNOSIS — F20.9 SCHIZOPHRENIA, UNSPECIFIED TYPE: ICD-10-CM

## 2024-08-14 DIAGNOSIS — F19.11 HISTORY OF SUBSTANCE ABUSE (CMS/HCC): ICD-10-CM

## 2024-08-14 DIAGNOSIS — I10 ESSENTIAL HYPERTENSION: ICD-10-CM

## 2024-08-14 DIAGNOSIS — K21.9 GASTROESOPHAGEAL REFLUX DISEASE WITHOUT ESOPHAGITIS: ICD-10-CM

## 2024-08-14 DIAGNOSIS — G89.4 CHRONIC PAIN SYNDROME: ICD-10-CM

## 2024-08-14 DIAGNOSIS — F31.10 BIPOLAR AFFECTIVE DISORDER, CURRENT EPISODE MANIC, CURRENT EPISODE SEVERITY UNSPECIFIED (CMS/HCC): ICD-10-CM

## 2024-08-14 DIAGNOSIS — F41.9 ANXIETY: ICD-10-CM

## 2024-08-14 DIAGNOSIS — F33.9 EPISODE OF RECURRENT MAJOR DEPRESSIVE DISORDER, UNSPECIFIED DEPRESSION EPISODE SEVERITY (CMS/HCC): ICD-10-CM

## 2024-08-14 DIAGNOSIS — R73.03 PREDIABETES: Primary | ICD-10-CM

## 2024-08-14 PROCEDURE — 3079F DIAST BP 80-89 MM HG: CPT | Performed by: FAMILY MEDICINE

## 2024-08-14 PROCEDURE — 3008F BODY MASS INDEX DOCD: CPT | Performed by: FAMILY MEDICINE

## 2024-08-14 PROCEDURE — 3075F SYST BP GE 130 - 139MM HG: CPT | Performed by: FAMILY MEDICINE

## 2024-08-14 PROCEDURE — 99214 OFFICE O/P EST MOD 30 MIN: CPT | Performed by: FAMILY MEDICINE

## 2024-08-14 RX ORDER — NALTREXONE HYDROCHLORIDE 50 MG/1
50 TABLET, FILM COATED ORAL DAILY
Start: 2024-08-14 | End: 2024-11-01 | Stop reason: ALTCHOICE

## 2024-08-14 RX ORDER — CLONIDINE HYDROCHLORIDE 0.1 MG/1
0.1 TABLET ORAL 2 TIMES DAILY
Start: 2024-08-14

## 2024-08-14 RX ORDER — ALBUTEROL SULFATE 90 UG/1
2 INHALANT RESPIRATORY (INHALATION) EVERY 6 HOURS PRN
Qty: 18 G | Refills: 0 | Status: SHIPPED | OUTPATIENT
Start: 2024-08-14

## 2024-08-14 RX ORDER — MIRABEGRON 50 MG/1
50 TABLET, FILM COATED, EXTENDED RELEASE ORAL DAILY
Start: 2024-08-14

## 2024-08-14 RX ORDER — MIRABEGRON 50 MG/1
50 TABLET, FILM COATED, EXTENDED RELEASE ORAL DAILY
Start: 2024-08-14 | End: 2024-08-14 | Stop reason: SDUPTHER

## 2024-08-14 ASSESSMENT — ENCOUNTER SYMPTOMS
BACK PAIN: 1
CONSTITUTIONAL NEGATIVE: 1
ARTHRALGIAS: 1

## 2024-08-22 ENCOUNTER — TELEPHONE (OUTPATIENT)
Dept: FAMILY MEDICINE | Facility: CLINIC | Age: 59
End: 2024-08-22

## 2024-08-22 LAB
ALBUMIN SERPL-MCNC: 4.3 G/DL (ref 3.6–5.1)
ALBUMIN/CREAT UR: 5 MG/G CREAT
ALBUMIN/GLOB SERPL: 1.7 (CALC) (ref 1–2.5)
ALP SERPL-CCNC: 83 U/L (ref 37–153)
ALT SERPL-CCNC: 8 U/L (ref 6–29)
APPEARANCE UR: CLEAR
AST SERPL-CCNC: 10 U/L (ref 10–35)
BACTERIA #/AREA URNS HPF: ABNORMAL /HPF
BASOPHILS # BLD AUTO: 51 CELLS/UL (ref 0–200)
BASOPHILS NFR BLD AUTO: 1.1 %
BILIRUB SERPL-MCNC: 0.6 MG/DL (ref 0.2–1.2)
BILIRUB UR QL STRIP: NEGATIVE
BUN SERPL-MCNC: 12 MG/DL (ref 7–25)
BUN/CREAT SERPL: ABNORMAL (CALC) (ref 6–22)
CALCIUM SERPL-MCNC: 9.3 MG/DL (ref 8.6–10.4)
CHLORIDE SERPL-SCNC: 109 MMOL/L (ref 98–110)
CHOLEST SERPL-MCNC: 193 MG/DL
CHOLEST/HDLC SERPL: 2.8 (CALC)
CO2 SERPL-SCNC: 25 MMOL/L (ref 20–32)
COLOR UR: YELLOW
CREAT SERPL-MCNC: 1 MG/DL (ref 0.5–1.03)
CREAT UR-MCNC: 177 MG/DL (ref 20–275)
EGFRCR SERPLBLD CKD-EPI 2021: 65 ML/MIN/1.73M2
EOSINOPHIL # BLD AUTO: 193 CELLS/UL (ref 15–500)
EOSINOPHIL NFR BLD AUTO: 4.2 %
ERYTHROCYTE [DISTWIDTH] IN BLOOD BY AUTOMATED COUNT: 13.3 % (ref 11–15)
GLOBULIN SER CALC-MCNC: 2.6 G/DL (CALC) (ref 1.9–3.7)
GLUCOSE SERPL-MCNC: 106 MG/DL (ref 65–99)
GLUCOSE UR QL STRIP: NEGATIVE
HBA1C MFR BLD: 6 % OF TOTAL HGB
HCT VFR BLD AUTO: 39.2 % (ref 35–45)
HDLC SERPL-MCNC: 70 MG/DL
HGB BLD-MCNC: 12.9 G/DL (ref 11.7–15.5)
HGB UR QL STRIP: NEGATIVE
HYALINE CASTS #/AREA URNS LPF: ABNORMAL /LPF
KETONES UR QL STRIP: NEGATIVE
LDLC SERPL CALC-MCNC: 104 MG/DL (CALC)
LEUKOCYTE ESTERASE UR QL STRIP: ABNORMAL
LYMPHOCYTES # BLD AUTO: 1113 CELLS/UL (ref 850–3900)
LYMPHOCYTES NFR BLD AUTO: 24.2 %
MCH RBC QN AUTO: 28.4 PG (ref 27–33)
MCHC RBC AUTO-ENTMCNC: 32.9 G/DL (ref 32–36)
MCV RBC AUTO: 86.2 FL (ref 80–100)
MICROALBUMIN UR-MCNC: 0.9 MG/DL
MONOCYTES # BLD AUTO: 322 CELLS/UL (ref 200–950)
MONOCYTES NFR BLD AUTO: 7 %
NEUTROPHILS # BLD AUTO: 2921 CELLS/UL (ref 1500–7800)
NEUTROPHILS NFR BLD AUTO: 63.5 %
NITRITE UR QL STRIP: NEGATIVE
NONHDLC SERPL-MCNC: 123 MG/DL (CALC)
PH UR STRIP: 5.5 [PH] (ref 5–8)
PLATELET # BLD AUTO: 278 THOUSAND/UL (ref 140–400)
PMV BLD REES-ECKER: 11.3 FL (ref 7.5–12.5)
POTASSIUM SERPL-SCNC: 4.1 MMOL/L (ref 3.5–5.3)
PROT SERPL-MCNC: 6.9 G/DL (ref 6.1–8.1)
PROT UR QL STRIP: NEGATIVE
RBC # BLD AUTO: 4.55 MILLION/UL (ref 3.8–5.1)
RBC #/AREA URNS HPF: ABNORMAL /HPF
SERVICE CMNT-IMP: ABNORMAL
SODIUM SERPL-SCNC: 144 MMOL/L (ref 135–146)
SP GR UR STRIP: 1.02 (ref 1–1.03)
SQUAMOUS #/AREA URNS HPF: ABNORMAL /HPF
TRIGL SERPL-MCNC: 91 MG/DL
TSH SERPL-ACNC: 1.66 MIU/L (ref 0.4–4.5)
WBC # BLD AUTO: 4.6 THOUSAND/UL (ref 3.8–10.8)
WBC #/AREA URNS HPF: ABNORMAL /HPF

## 2024-08-22 NOTE — TELEPHONE ENCOUNTER
Nothing urgent noted on labs  In terms of her question for the urinalysis ---urinalysis had some leukocyte esterase and some squamous epithelial cells both of which have been present before and are typically no evidence of UTI/overall normal  PCP can go over this blood work when he returns

## 2024-08-22 NOTE — TELEPHONE ENCOUNTER
Patient called to speak with Dr Grullon or nurse to discuss recent lab results. Please give the patient a call back to advise.

## 2024-08-27 NOTE — TELEPHONE ENCOUNTER
Please advise that her LDL was elevated on her report, optimal goal less than 100.  HDL was good at 70.  Triglycerides are good at 91, goal less than 150.  Continue on the atorvastatin.  Recommend well balanced diet with plenty of fruits and vegetables, whole grains, lean proteins and low fat dairy. Recommend an exercise program - goal should be at least 150 minutes/week of moderate exercise.     Glucose remains in the prediabetic range.  Would recommend monitoring her carbohydrate intake and decreasing her intake of bread, rice, pasta, starches, juice, soda and sweets.    Otherwise her kidney function, electrolytes, liver function, blood count, thyroid function, urine test and all other labs are in normal range.    The abnormal findings on the urine test are consistent with a contaminated sample.  No cause for concern.

## 2024-08-29 ENCOUNTER — TELEPHONE (OUTPATIENT)
Dept: NEUROSURGERY | Facility: CLINIC | Age: 59
End: 2024-08-29
Payer: COMMERCIAL

## 2024-08-29 NOTE — TELEPHONE ENCOUNTER
MRI L spine and Brain approved thru 10/18/24  Auth#: [id number]    S/w pt 8/20/24, gave auth/sched info. We plan to call her with results

## 2024-09-04 DIAGNOSIS — G89.4 CHRONIC PAIN SYNDROME: ICD-10-CM

## 2024-09-04 RX ORDER — MELOXICAM 15 MG/1
15 TABLET ORAL DAILY
Qty: 30 TABLET | Refills: 1 | Status: SHIPPED | OUTPATIENT
Start: 2024-09-04 | End: 2024-10-07

## 2024-09-23 ENCOUNTER — TRANSCRIBE ORDERS (OUTPATIENT)
Dept: SCHEDULING | Age: 59
End: 2024-09-23

## 2024-09-23 DIAGNOSIS — D36.10 BENIGN NEOPLASM OF PERIPHERAL NERVES AND AUTONOMIC NERVOUS SYSTEM, UNSPECIFIED: Primary | ICD-10-CM

## 2024-10-07 DIAGNOSIS — G89.4 CHRONIC PAIN SYNDROME: ICD-10-CM

## 2024-10-07 DIAGNOSIS — I10 ESSENTIAL HYPERTENSION: ICD-10-CM

## 2024-10-07 RX ORDER — LOSARTAN POTASSIUM 100 MG/1
100 TABLET ORAL DAILY
Qty: 90 TABLET | Refills: 0 | Status: SHIPPED | OUTPATIENT
Start: 2024-10-07 | End: 2025-02-06

## 2024-10-07 RX ORDER — MELOXICAM 15 MG/1
15 TABLET ORAL DAILY
Qty: 30 TABLET | Refills: 0 | Status: SHIPPED | OUTPATIENT
Start: 2024-10-07 | End: 2024-11-01

## 2024-10-28 ENCOUNTER — TELEPHONE (OUTPATIENT)
Dept: BARIATRICS | Facility: CLINIC | Age: 59
End: 2024-10-28

## 2024-10-29 ENCOUNTER — TELEPHONE (OUTPATIENT)
Dept: FAMILY MEDICINE | Facility: CLINIC | Age: 59
End: 2024-10-29

## 2024-10-29 NOTE — TELEPHONE ENCOUNTER
Patient called with complaints of swelling and pain in L knee having difficulty walking- pain level 7/10. Call transferred to [place] for triage.

## 2024-10-29 NOTE — TELEPHONE ENCOUNTER
S/w pt. Stated on Saturday her leg gave out and not experiencing left knee pain,s welling and difficulty walking. Scheduled for today at 2p with Dr. Pope.

## 2024-10-31 ENCOUNTER — TELEPHONE (OUTPATIENT)
Dept: FAMILY MEDICINE | Facility: CLINIC | Age: 59
End: 2024-10-31
Payer: COMMERCIAL

## 2024-10-31 ENCOUNTER — TELEPHONE (OUTPATIENT)
Dept: FAMILY MEDICINE | Facility: CLINIC | Age: 59
End: 2024-10-31

## 2024-10-31 NOTE — TELEPHONE ENCOUNTER
Patient called with complaints of Leg pain, swelling and difficulty walking. Call transferred to [place] for triage.

## 2024-10-31 NOTE — TELEPHONE ENCOUNTER
S/w pt. She could not scheduled for today but will be in tomorrow. Another message sent to nurse in a different encounter.

## 2024-10-31 NOTE — TELEPHONE ENCOUNTER
Patient called with same complaint. She canceled the appt she had on 10/29, could not come in today and is scheduled now for tomorrow.

## 2024-11-01 ENCOUNTER — HOSPITAL ENCOUNTER (OUTPATIENT)
Dept: RADIOLOGY | Age: 59
Discharge: HOME | End: 2024-11-01
Attending: NURSE PRACTITIONER
Payer: COMMERCIAL

## 2024-11-01 ENCOUNTER — OFFICE VISIT (OUTPATIENT)
Dept: FAMILY MEDICINE | Facility: CLINIC | Age: 59
End: 2024-11-01
Payer: COMMERCIAL

## 2024-11-01 VITALS
HEART RATE: 70 BPM | HEIGHT: 68 IN | SYSTOLIC BLOOD PRESSURE: 128 MMHG | OXYGEN SATURATION: 95 % | WEIGHT: 293 LBS | BODY MASS INDEX: 44.41 KG/M2 | DIASTOLIC BLOOD PRESSURE: 82 MMHG | TEMPERATURE: 97.7 F

## 2024-11-01 DIAGNOSIS — M25.511 ACUTE PAIN OF RIGHT SHOULDER: ICD-10-CM

## 2024-11-01 DIAGNOSIS — M25.562 ACUTE PAIN OF LEFT KNEE: Primary | ICD-10-CM

## 2024-11-01 DIAGNOSIS — M25.562 ACUTE PAIN OF LEFT KNEE: ICD-10-CM

## 2024-11-01 PROCEDURE — 99214 OFFICE O/P EST MOD 30 MIN: CPT | Performed by: NURSE PRACTITIONER

## 2024-11-01 PROCEDURE — 73030 X-RAY EXAM OF SHOULDER: CPT | Mod: RT

## 2024-11-01 PROCEDURE — 3079F DIAST BP 80-89 MM HG: CPT | Performed by: NURSE PRACTITIONER

## 2024-11-01 PROCEDURE — 73564 X-RAY EXAM KNEE 4 OR MORE: CPT | Mod: LT

## 2024-11-01 PROCEDURE — 3008F BODY MASS INDEX DOCD: CPT | Performed by: NURSE PRACTITIONER

## 2024-11-01 PROCEDURE — 3074F SYST BP LT 130 MM HG: CPT | Performed by: NURSE PRACTITIONER

## 2024-11-01 RX ORDER — SALINE NASAL SPRAY 1.5 OZ
SOLUTION NASAL
COMMUNITY
Start: 2024-10-22

## 2024-11-01 RX ORDER — DULOXETIN HYDROCHLORIDE 30 MG/1
30 CAPSULE, DELAYED RELEASE ORAL DAILY
COMMUNITY
Start: 2024-10-25

## 2024-11-01 RX ORDER — QUETIAPINE FUMARATE 100 MG/1
100 TABLET, FILM COATED ORAL NIGHTLY
COMMUNITY
Start: 2024-10-09

## 2024-11-01 RX ORDER — BUPROPION HYDROCHLORIDE 300 MG/1
300 TABLET ORAL DAILY
COMMUNITY
Start: 2024-10-21

## 2024-11-01 RX ORDER — NAPROXEN 500 MG/1
500 TABLET ORAL 2 TIMES DAILY WITH MEALS
Qty: 30 TABLET | Refills: 0 | Status: SHIPPED | OUTPATIENT
Start: 2024-11-01

## 2024-11-01 RX ORDER — QUETIAPINE FUMARATE 50 MG/1
50 TABLET, FILM COATED ORAL NIGHTLY
COMMUNITY
Start: 2024-10-21

## 2024-11-01 ASSESSMENT — ENCOUNTER SYMPTOMS
CHILLS: 0
ARTHRALGIAS: 1
FATIGUE: 0
COUGH: 0
NAUSEA: 0
SHORTNESS OF BREATH: 0
CONSTIPATION: 0
DIARRHEA: 0
ABDOMINAL PAIN: 0
LIGHT-HEADEDNESS: 1
HEADACHES: 0
WHEEZING: 0
VOMITING: 0
FEVER: 0
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
## 2024-11-01 NOTE — TELEPHONE ENCOUNTER
[employer] transportation phone number was not included in this message to CB to confirm appointment time for today. Therefore, I called patient to confirm her appointment time of 11:30 am today.    Luli PATEL

## 2024-11-04 ENCOUNTER — TELEPHONE (OUTPATIENT)
Dept: FAMILY MEDICINE | Facility: CLINIC | Age: 59
End: 2024-11-04

## 2024-11-04 NOTE — TELEPHONE ENCOUNTER
Test Result Request  Patient PCP: Prosper Grullon MD  Ordering Provider: KAREN Cruz  Test Name: Xray of left knee & right shoulder  Testing Location: [facility]  Test Date: 11/01/2024    Test results may have been made available for review in [facility] before your provider has had a chance to review and discuss the results with you. Please allow time for your provider to review your results.

## 2024-11-05 NOTE — TELEPHONE ENCOUNTER
I called and touched base with the patient   the patient is still having pain and selling in knee and wanted the results of x-ray   I told the patient that Raman is out of the office until Wednesday and I will call the patient as soon as the results are addressed

## 2024-11-11 ENCOUNTER — TELEPHONE (OUTPATIENT)
Dept: FAMILY MEDICINE | Facility: CLINIC | Age: 59
End: 2024-11-11
Payer: COMMERCIAL

## 2024-11-11 ENCOUNTER — HOSPITAL ENCOUNTER (OUTPATIENT)
Dept: RADIOLOGY | Facility: HOSPITAL | Age: 59
Discharge: HOME | End: 2024-11-11
Attending: NEUROLOGICAL SURGERY
Payer: COMMERCIAL

## 2024-11-11 DIAGNOSIS — D36.10 BENIGN NEOPLASM OF PERIPHERAL NERVES AND AUTONOMIC NERVOUS SYSTEM, UNSPECIFIED: ICD-10-CM

## 2024-11-11 RX ORDER — GADOBUTROL 604.72 MG/ML
13.8 INJECTION INTRAVENOUS ONCE
Status: COMPLETED | OUTPATIENT
Start: 2024-11-11 | End: 2024-11-11

## 2024-11-11 RX ADMIN — GADOBUTROL 14 ML: 604.72 INJECTION INTRAVENOUS at 14:19

## 2024-11-11 NOTE — TELEPHONE ENCOUNTER
She is not due for a mammogram.  Last mammogram done 2/22/2024.  She can reach out in January for an order.

## 2024-11-11 NOTE — TELEPHONE ENCOUNTER
Test Order Request   Patient PCP: Prosper Grullon MD  Next Office Visit: Visit date not found  Preferred Lab: [facility]  [address] [address]  Tests Requested: annual screening mammo   , MyChart, or US Mail?: [email or web address]

## 2024-11-13 ENCOUNTER — TELEPHONE (OUTPATIENT)
Dept: FAMILY MEDICINE | Facility: CLINIC | Age: 59
End: 2024-11-13

## 2024-11-13 DIAGNOSIS — G89.4 CHRONIC PAIN SYNDROME: ICD-10-CM

## 2024-11-13 RX ORDER — MELOXICAM 15 MG/1
15 TABLET ORAL DAILY
Qty: 30 TABLET | Refills: 0 | OUTPATIENT
Start: 2024-11-13

## 2024-11-13 NOTE — TELEPHONE ENCOUNTER
Description of result requested: MRI Lumbar Spine W WO    Date of test or study: 11/11/24      Location where test or study completed: [facility] MRI2 OP OPEN      Additional Comments:patient is requesting a call to discuss her results        Next encounter with provider:  Visit date not found

## 2024-11-14 ENCOUNTER — TELEPHONE (OUTPATIENT)
Dept: NEUROSURGERY | Facility: CLINIC | Age: 59
End: 2024-11-14
Payer: COMMERCIAL

## 2024-11-14 NOTE — TELEPHONE ENCOUNTER
----- Message from Yun ROSA sent at 11/14/2024  1:46 PM EST -----  Patient left message stating that they had an mri done recently and they would like a call to discuss results. Phone # [phone]

## 2024-11-18 DIAGNOSIS — D33.3 VESTIBULAR SCHWANNOMA (CMS/HCC): Primary | ICD-10-CM

## 2024-11-18 NOTE — PROGRESS NOTES
Spoke with patient regarding lumbar MRI which shows stable L1 schwannoma of 5mm. She does not have any new neurologic symptoms or weakness. She also has right sided vestibular schwannoma. She reports stable decrease in hearing on the right since last being seen in 2023. Recommend repeating brain MRI now as well to evaluate the schwannoma. Patient agreed and will have study completed but she relays on public transportation so may take a few weeks. She will call with any additional questions or concerns.

## 2024-11-19 NOTE — TELEPHONE ENCOUNTER
MRI Brain approved,     Left message for patient with auth and scheduling info. We plan to call patient with results.

## 2024-11-25 ENCOUNTER — TELEPHONE (OUTPATIENT)
Dept: FAMILY MEDICINE | Facility: CLINIC | Age: 59
End: 2024-11-25
Payer: COMMERCIAL

## 2024-11-25 NOTE — TELEPHONE ENCOUNTER
Request for Medical Advice (NON-URGENT)   Patient PCP: Prosper Grullon MD  New or Existing Issue: new  Question or Concern: pt called stating she needs a L knee replacement after her most recent MRI results from 11/21/24. She is asking for Dr Grullon's recommendation for an orthopedic surgeon in the area.     [phone]     Preferred Pharmacy:   [facility] - [facility] [address]  [address] [address]  Phone: [phone] Fax: [phone]    [id number] - [place], [place] - [address]  [address] [address]  Phone: [phone] Fax: [phone]      The practice will reach out to discuss your Medical Question or Concern within 2 business days.

## 2024-12-10 ENCOUNTER — TELEPHONE (OUTPATIENT)
Dept: FAMILY MEDICINE | Facility: CLINIC | Age: 59
End: 2024-12-10

## 2024-12-10 NOTE — TELEPHONE ENCOUNTER
Patient called in to advise that an  will be calling in to get her medical history. Patient explains she only wants the  to have information regarding her inhaler. Please advise.

## 2024-12-16 ENCOUNTER — TELEPHONE (OUTPATIENT)
Dept: NEUROSURGERY | Facility: CLINIC | Age: 59
End: 2024-12-16
Payer: COMMERCIAL

## 2024-12-16 ENCOUNTER — DOCUMENTATION (OUTPATIENT)
Dept: NEUROSURGERY | Facility: CLINIC | Age: 59
End: 2024-12-16
Payer: COMMERCIAL

## 2024-12-16 DIAGNOSIS — D36.10 SCHWANNOMA: Primary | ICD-10-CM

## 2024-12-16 NOTE — TELEPHONE ENCOUNTER
----- Message from Johana KRUGER sent at 12/16/2024  9:47 AM EST -----  Regarding: Patient Message - Dr. Varela  Patient [facility] - [phone]    Patient would like call back as she has questions about her brain tumor. She has been having anger and aggression symptoms and would like to discuss if it's related to her tumor.

## 2024-12-16 NOTE — PROGRESS NOTES
We are recommending patient follow-up with Dr. Michele at [facility]. Will have office call and give referral.

## 2024-12-17 ENCOUNTER — OFFICE VISIT (OUTPATIENT)
Dept: BARIATRICS | Facility: CLINIC | Age: 59
End: 2024-12-17
Payer: COMMERCIAL

## 2024-12-17 VITALS
HEIGHT: 68 IN | DIASTOLIC BLOOD PRESSURE: 80 MMHG | SYSTOLIC BLOOD PRESSURE: 130 MMHG | HEART RATE: 83 BPM | TEMPERATURE: 98.2 F | WEIGHT: 288.4 LBS | BODY MASS INDEX: 43.71 KG/M2

## 2024-12-17 DIAGNOSIS — E78.00 HYPERCHOLESTEROLEMIA: ICD-10-CM

## 2024-12-17 DIAGNOSIS — G47.33 OBSTRUCTIVE SLEEP APNEA SYNDROME: ICD-10-CM

## 2024-12-17 DIAGNOSIS — K76.0 HEPATIC STEATOSIS: ICD-10-CM

## 2024-12-17 DIAGNOSIS — I10 ESSENTIAL HYPERTENSION: ICD-10-CM

## 2024-12-17 DIAGNOSIS — E66.01 MORBID (SEVERE) OBESITY DUE TO EXCESS CALORIES (HCC): Primary | ICD-10-CM

## 2024-12-17 PROBLEM — K52.9 CHRONIC DIARRHEA: Status: ACTIVE | Noted: 2023-08-17

## 2024-12-17 PROBLEM — N32.81 OVERACTIVE BLADDER: Status: ACTIVE | Noted: 2018-07-10

## 2024-12-17 PROBLEM — K57.30 DIVERTICULOSIS OF COLON: Status: ACTIVE | Noted: 2024-12-17

## 2024-12-17 PROBLEM — Z91.09 ENVIRONMENTAL ALLERGIES: Status: ACTIVE | Noted: 2017-12-04

## 2024-12-17 PROBLEM — F41.9 ANXIETY: Status: ACTIVE | Noted: 2017-12-04

## 2024-12-17 PROBLEM — R73.03 PREDIABETES: Chronic | Status: ACTIVE | Noted: 2019-04-15

## 2024-12-17 PROBLEM — E55.9 VITAMIN D DEFICIENCY: Status: ACTIVE | Noted: 2019-08-19

## 2024-12-17 PROBLEM — K21.9 GASTROESOPHAGEAL REFLUX DISEASE: Status: ACTIVE | Noted: 2017-12-04

## 2024-12-17 PROBLEM — F25.1 SCHIZOAFFECTIVE DISORDER, DEPRESSIVE TYPE (HCC): Status: ACTIVE | Noted: 2023-05-05

## 2024-12-17 PROBLEM — F31.10 BIPOLAR AFFECTIVE DISORDER, CURRENT EPISODE MANIC (HCC): Status: ACTIVE | Noted: 2018-07-10

## 2024-12-17 PROBLEM — F17.200 NICOTINE DEPENDENCE: Status: ACTIVE | Noted: 2020-09-21

## 2024-12-17 PROBLEM — D36.10 SCHWANNOMA: Status: ACTIVE | Noted: 2024-02-14

## 2024-12-17 PROBLEM — Z80.0 FAMILY HISTORY OF COLON CANCER: Status: ACTIVE | Noted: 2024-12-17

## 2024-12-17 PROBLEM — M17.10 ARTHRITIS OF KNEE: Status: ACTIVE | Noted: 2018-08-07

## 2024-12-17 PROBLEM — F43.10 PTSD (POST-TRAUMATIC STRESS DISORDER): Status: ACTIVE | Noted: 2018-07-10

## 2024-12-17 PROCEDURE — 99204 OFFICE O/P NEW MOD 45 MIN: CPT | Performed by: PHYSICIAN ASSISTANT

## 2024-12-17 RX ORDER — BUPROPION HYDROCHLORIDE 300 MG/1
300 TABLET ORAL DAILY
COMMUNITY
Start: 2024-10-21

## 2024-12-17 RX ORDER — ALBUTEROL SULFATE 90 UG/1
2 INHALANT RESPIRATORY (INHALATION) EVERY 6 HOURS PRN
COMMUNITY
Start: 2024-08-14

## 2024-12-17 RX ORDER — CLONIDINE HYDROCHLORIDE 0.1 MG/1
0.1 TABLET ORAL 2 TIMES DAILY
COMMUNITY
Start: 2024-08-14

## 2024-12-17 RX ORDER — MIRABEGRON 25 MG/1
TABLET, FILM COATED, EXTENDED RELEASE ORAL
COMMUNITY

## 2024-12-17 RX ORDER — ATORVASTATIN CALCIUM 10 MG/1
10 TABLET, FILM COATED ORAL
COMMUNITY
Start: 2024-08-13

## 2024-12-17 RX ORDER — LOSARTAN POTASSIUM 100 MG/1
100 TABLET ORAL DAILY
COMMUNITY
Start: 2024-10-07 | End: 2025-04-05

## 2024-12-17 RX ORDER — MELOXICAM 15 MG/1
TABLET ORAL
COMMUNITY
Start: 2024-10-21

## 2024-12-17 RX ORDER — TIRZEPATIDE 2.5 MG/.5ML
2.5 INJECTION, SOLUTION SUBCUTANEOUS WEEKLY
Qty: 2 ML | Refills: 0 | Status: SHIPPED | OUTPATIENT
Start: 2024-12-17 | End: 2025-01-14

## 2024-12-17 RX ORDER — DULOXETIN HYDROCHLORIDE 60 MG/1
60 CAPSULE, DELAYED RELEASE ORAL
COMMUNITY
Start: 2024-12-11

## 2024-12-17 RX ORDER — QUETIAPINE FUMARATE 100 MG/1
100 TABLET, FILM COATED ORAL
COMMUNITY

## 2024-12-17 NOTE — PROGRESS NOTES
Assessment/Plan:    Morbid (severe) obesity due to excess calories (HCC)  -Discussed options of HealthyCORE-Intensive Lifestyle Intervention Program, Very Low Calorie Diet-VLCD, and Conservative Program and the role of weight loss medications.Explained the importance of making lifestyle changes if utilizing medication to aid in weight loss  -not a surgical candidate due to MH d/o and substance abuse  -Initial weight loss goal of 5-10% weight loss for improved health  -Screening labs and records reviewed from prior. A1c/lipid from 11/30/24     -Patient is interested in pursuing conservative program  -Calorie goals (1800 calories), sample menu (8872-9874 calories), portion size guidelines, and food logging reviewed with the patient.     Goals:  -Food log (ie.) [email or web address]. Recommend to plan on meals and devise a food schedule  - To continue to drink at least 64oz of water daily.No sugary beverages.  -Irecommend joining silver sneakers and start to swim    To start on zepbound.  To start on initial dose of medication and then to titrate as tolerated.  They have tried more than 6 months of lifestyle modifications including diet and activity changes and has had insignificant weight loss of less than 1 lb a week. Patient denies personal and family history of MCT and MEN2 tumors. Patient denies personal history of pancreatitis. Side effects discussed but not limited to diarrhea, bloating, constipation, GI upset, heartburn, increased heart rate, headache, low blood sugar, fatigue and dizziness. Titration and medication administration discussed.  Medication agreement signed 12/17/24    Initial Weight:288.4  Goal Weight:185        Obstructive sleep apnea syndrome  -encouraged continued use of CPAP machine  -may improve with 20-30% weight loss      Essential hypertension  On clonidine and losartan  -should improve with weight loss, dietary, and lifestyle changes      Hepatic  steatosis  -should improve with weight loss, dietary, and lifestyle changes      Return in about 3 months (around 3/17/2025).      Diagnoses and all orders for this visit:    Morbid (severe) obesity due to excess calories (HCC)  -     tirzepatide (Zepbound) 2.5 mg/0.5 mL auto-injector; Inject 0.5 mL (2.5 mg total) under the skin once a week for 28 days    Essential hypertension  -     tirzepatide (Zepbound) 2.5 mg/0.5 mL auto-injector; Inject 0.5 mL (2.5 mg total) under the skin once a week for 28 days    Hypercholesterolemia  -     tirzepatide (Zepbound) 2.5 mg/0.5 mL auto-injector; Inject 0.5 mL (2.5 mg total) under the skin once a week for 28 days    Obstructive sleep apnea syndrome    Hepatic steatosis    Other orders  -     buPROPion (WELLBUTRIN XL) 300 mg 24 hr tablet; Take 300 mg by mouth daily  -     atorvastatin (LIPITOR) 10 mg tablet; 10 mg  -     cloNIDine (CATAPRES) 0.1 mg tablet; Take 0.1 mg by mouth 2 (two) times a day  -     DULoxetine (CYMBALTA) 60 mg delayed release capsule; Take 60 mg by mouth  -     Mirabegron ER 25 MG TB24  -     meloxicam (MOBIC) 15 mg tablet  -     omeprazole (PriLOSEC) 20 mg delayed release capsule  -     QUEtiapine (SEROquel) 100 mg tablet; Take 100 mg by mouth daily at bedtime  -     albuterol (PROVENTIL HFA,VENTOLIN HFA) 90 mcg/act inhaler; Inhale 2 puffs every 6 (six) hours as needed  -     losartan (COZAAR) 100 MG tablet; Take 100 mg by mouth daily          Subjective:   Chief Complaint   Patient presents with    Consult     Mwm onsult: GW: 180lb  Waist: 54in  SB: does have sleep apnea        Patient ID: David Wynn  is a 59 y.o. female with excess weight/obesity here to pursue weight management.    History reviewed. No pertinent past medical history.    HPI: Here for MWM consult    She was 306lb and tried to decrease snacking . Snackiong is worse in the daytime.  Typically doing more snacks than foods. She was interested in surgery but not a candidate and now  interested in injections    Obesity/Excess Weight:  Severity:  class III   Onset:  years    Modifiers: Diet and Exercise  Contributing factors: Poor Food Choices, Insufficient Physical Activity, and Stress/Emotional Eating, depression, medication    Hydration:water 1/2 gal   Alcohol: holidays  Exercise:nothing regular  Occupation:disabled  Sleep:  Dining out/takeout:rare    Diet Recall:  Wakes -5AM  10AM: sometimes cookies  Potato chips  D: chicken , vegetable (brocoli)    The following portions of the patient's history were reviewed and updated as appropriate: She  has no past medical history on file.  She   Patient Active Problem List    Diagnosis Date Noted    Morbid (severe) obesity due to excess calories (HCC) 12/17/2024    Diverticulosis of colon 12/17/2024    Family history of colon cancer 12/17/2024    Schwannoma 02/14/2024    Chronic diarrhea 08/17/2023    Schizoaffective disorder, depressive type (HCC) 05/05/2023    Hepatic steatosis 10/06/2020    Nicotine dependence 09/21/2020    Vitamin D deficiency 08/19/2019    Prediabetes 04/15/2019    Arthritis of knee 08/07/2018    Obstructive sleep apnea syndrome 08/07/2018    Bipolar affective disorder, current episode manic (HCC) 07/10/2018    Overactive bladder 07/10/2018    PTSD (post-traumatic stress disorder) 07/10/2018    Anxiety 12/04/2017    Essential hypertension 12/04/2017    Environmental allergies 12/04/2017    Gastroesophageal reflux disease 12/04/2017    Hypercholesterolemia 12/04/2017     She  has a past surgical history that includes Knee surgery (Right, 2019); Hysterectomy (2010); Cholecystectomy (2020); and Wrist surgery (Left, 2022).  Her family history includes Heart disease in her brother; Lung cancer in her father; colon cancer in her mother.  She  reports that she has been smoking cigarettes. She has never used smokeless tobacco. She reports that she does not currently use alcohol. She reports that she does not use drugs.  Current Outpatient  Medications   Medication Sig Dispense Refill    albuterol (PROVENTIL HFA,VENTOLIN HFA) 90 mcg/act inhaler Inhale 2 puffs every 6 (six) hours as needed      atorvastatin (LIPITOR) 10 mg tablet 10 mg      buPROPion (WELLBUTRIN XL) 300 mg 24 hr tablet Take 300 mg by mouth daily      cloNIDine (CATAPRES) 0.1 mg tablet Take 0.1 mg by mouth 2 (two) times a day      DULoxetine (CYMBALTA) 60 mg delayed release capsule Take 60 mg by mouth      losartan (COZAAR) 100 MG tablet Take 100 mg by mouth daily      meloxicam (MOBIC) 15 mg tablet       omeprazole (PriLOSEC) 20 mg delayed release capsule       tirzepatide (Zepbound) 2.5 mg/0.5 mL auto-injector Inject 0.5 mL (2.5 mg total) under the skin once a week for 28 days 2 mL 0    Mirabegron ER 25 MG TB24       QUEtiapine (SEROquel) 100 mg tablet Take 100 mg by mouth daily at bedtime       No current facility-administered medications for this visit.     Current Outpatient Medications on File Prior to Visit   Medication Sig    albuterol (PROVENTIL HFA,VENTOLIN HFA) 90 mcg/act inhaler Inhale 2 puffs every 6 (six) hours as needed    atorvastatin (LIPITOR) 10 mg tablet 10 mg    buPROPion (WELLBUTRIN XL) 300 mg 24 hr tablet Take 300 mg by mouth daily    cloNIDine (CATAPRES) 0.1 mg tablet Take 0.1 mg by mouth 2 (two) times a day    DULoxetine (CYMBALTA) 60 mg delayed release capsule Take 60 mg by mouth    losartan (COZAAR) 100 MG tablet Take 100 mg by mouth daily    meloxicam (MOBIC) 15 mg tablet     omeprazole (PriLOSEC) 20 mg delayed release capsule     Mirabegron ER 25 MG TB24     QUEtiapine (SEROquel) 100 mg tablet Take 100 mg by mouth daily at bedtime     No current facility-administered medications on file prior to visit.     She has no known allergies..    Review of Systems   Constitutional:  Negative for fever.   Respiratory:  Negative for shortness of breath.    Cardiovascular:  Negative for chest pain and palpitations.   Gastrointestinal:  Positive for diarrhea. Negative for  "abdominal pain, constipation and vomiting.   Genitourinary:  Negative for difficulty urinating.   Musculoskeletal:  Positive for arthralgias.   Skin:  Negative for rash.   Neurological:  Negative for headaches.   Psychiatric/Behavioral:  Negative for dysphoric mood. The patient is not nervous/anxious.        Objective:    /80   Pulse 83   Temp 98.2 °F (36.8 °C)   Ht 5' 7.75\" (1.721 m)   Wt 131 kg (288 lb 6.4 oz)   BMI 44.18 kg/m²     Physical Exam  Vitals and nursing note reviewed.   Constitutional:       General: She is not in acute distress.     Appearance: She is well-developed. She is obese.   HENT:      Head: Normocephalic and atraumatic.   Eyes:      Conjunctiva/sclera: Conjunctivae normal.   Neck:      Thyroid: No thyromegaly.   Pulmonary:      Effort: Pulmonary effort is normal. No respiratory distress.   Skin:     Findings: No rash (visible).   Neurological:      Mental Status: She is alert and oriented to person, place, and time.   Psychiatric:         Mood and Affect: Mood normal.         Behavior: Behavior normal.        "

## 2024-12-17 NOTE — PATIENT INSTRUCTIONS
"Call or message after 2nd Patient Education     Weight Loss Diet   About this topic   There are many \"trendy\" weight loss diets that are popular today. Many of these diets can end up being more harmful than helpful. The healthiest way to lose weight is to burn more calories than you eat.  A weight loss diet should help you have a healthy view of eating. It is NOT healthy to stop eating to try and lose weight. A good diet plan will help you cut down your food intake and make healthy choices.  A healthy weight loss goal is 1 to 2 pounds (0.5 to 1 kg) per week. Reducing calories in your diet, burning calories through exercise, or both can help you lose weight. Combining a healthy diet with regular physical activity can help you get the best results.  To cut calories in your diet you can:  Switch from whole milk to 1% or skim milk.  Switch from regular cheese to low-fat or fat-free cheese.  Use healthier condiment choices:  Fat-free or low-fat sour cream or salad dressings  Spray butter  Diet syrups or jellies over regular  Try frozen yogurt as a dessert rather than eating ice cream.  Skip the chips. Snack on carrots, vegetables, or fruit. If chips are a favorite of yours, try the baked style and watch portion size.  Eat grilled, roasted, boiled, broiled, or baked meats. Avoid deep-frying. Choose skinless poultry, lean red meat, lean cuts of pork, and fish for good protein sources.  Try flavored no-calorie patrick. Do not drink soda and juices that have many calories.  Choose fruit instead of sweets.  General   Eating smaller meals more often may be helpful. This will keep you from overeating at your next meal. Also, eating meals slowly helps you feel full faster.  If eating 3 meals is a part of your lifestyle, choose more lean proteins and higher fiber foods to fill you up at each meal.  Do not skip meals. Most often if you skip a meal, you eat too much at the next meal.  Eat smaller portions. Use a smaller plate or " bowl for meals, and when you are eating out, eat half and take the rest home.  Plan ahead. Plan your meals and grocery list before going to the store. Planning will keep you from getting meals from restaurants.  Do not go to the grocery store hungry. You are more likely to buy snacks that are not good for you.  Portion out snacks. When you are having a snack, instead of grabbing the whole bag, portion a small amount out to give yourself a stopping point.  Drink water before and after your meals to help fill you up without the calories.  When eating starchy foods, choose whole-grain products. These have a lot of fiber which will make you feel full. Fiber also helps lower cholesterol and helps with bowel function.  If you need a helpful start, ask your doctor to send you to a dietitian for weight loss help.         What will the results be?   Losing excess weight will make your whole body healthier. You will have more energy for your daily activities and lower your risk for health problems.  What lifestyle changes are needed?   Stay active. Eating healthy is not always enough to lose weight. Burning calories by exercising is a big part of weight loss.  What foods are good to eat?   The key is to watch your portion sizes. It is best to choose foods that are lower in fat and calories.  Choose lean meats:  Boneless, skinless chicken breast  Pork loin  90% lean beef  Lean turkey meat  Fresh fish (not fried)  Choose low-fat dairy products:  1% or skim milk  Spray butter or margarine  Low-fat or fat-free cheese  Frozen yogurt or low-calorie ice cream  Choose fresh fruits, vegetables, beans and lentils, and whole wheat products more often.  Choose water to drink more often. Drink diet or no-calorie beverages when you want something other than water. Aim to get your calories from the foods you eat.  Choose smart snacks:  Fruits  Vegetables  Low-fat or nonfat yogurt  Low-fat or no-fat cheese, such as cottage cheese  Unsalted  nuts  Hard-boiled egg  Hummus  Guacamole  Natural peanut butter  Popcorn with no butter ? use pepper, garlic, or another spice to taste  Whole grain crackers  What foods should be limited or avoided?   Limit high-fat, high-sodium, and high-calorie foods like:  Fried foods  Processed meats  Whole-fat dairy products  Candy, cookies, chips, pastries  Sausage, salas, any full-fat meats  Soda, juice  Beer, wine, and mixed drinks (alcohol)  Will there be any other care needed?   What do I do first before trying to lose weight?  Talk to your doctor and dietitian to see if you need to lose weight. Work with them to set your weight loss goals.  If you have a chronic illness, such as high blood sugar or high blood pressure, ask a doctor or dietitian what diet and exercise is right for you.  Ask your doctor about how much you are able to exercise and what type of exercise is good for you.  Helpful tips   Keep a food journal to help keep you on track.  Join a support group.  Tips for burning calories:  If your workplace is near your house, choose to walk or bike to work instead of driving.  Take 20-minute walks each day. Walk around during your lunch break. You will not only burn calories, but will raise your energy for the rest of the day.  Take the stairs over the elevator.  Join a gym or exercise class with a friend.  Try to exercise 30 minutes a day for overall health. Three 10-minute sessions work too. Aim for 60 to 90 minutes a day to lose weight.  Drink lots of water before, during, and after exercise.  Last Reviewed Date   2021-06-24  Consumer Information Use and Disclaimer   This generalized information is a limited summary of diagnosis, treatment, and/or medication information. It is not meant to be comprehensive and should be used as a tool to help the user understand and/or assess potential diagnostic and treatment options. It does NOT include all information about conditions, treatments, medications, side effects,  or risks that may apply to a specific patient. It is not intended to be medical advice or a substitute for the medical advice, diagnosis, or treatment of a health care provider based on the health care provider's examination and assessment of a patient’s specific and unique circumstances. Patients must speak with a health care provider for complete information about their health, medical questions, and treatment options, including any risks or benefits regarding use of medications. This information does not endorse any treatments or medications as safe, effective, or approved for treating a specific patient. UpToDate, Inc. and its affiliates disclaim any warranty or liability relating to this information or the use thereof. The use of this information is governed by the Terms of Use, available at https://www.wolterskluwer.com/en/know/clinical-effectiveness-terms   Copyright   Copyright © 2024 UpToDate, Inc. and its affiliates and/or licensors. All rights reserved.

## 2024-12-17 NOTE — ASSESSMENT & PLAN NOTE
-Discussed options of HealthyCORE-Intensive Lifestyle Intervention Program, Very Low Calorie Diet-VLCD, and Conservative Program and the role of weight loss medications.Explained the importance of making lifestyle changes if utilizing medication to aid in weight loss  -not a surgical candidate due to MH d/o and substance abuse  -Initial weight loss goal of 5-10% weight loss for improved health  -Screening labs and records reviewed from prior. A1c/lipid from 11/30/24     -Patient is interested in pursuing conservative program  -Calorie goals (1800 calories), sample menu (7305-0626 calories), portion size guidelines, and food logging reviewed with the patient.     Goals:  -Food log (ie.) www.[employer].com,[employer],App[employer]ions-1800. Recommend to plan on meals and devise a food schedule  - To continue to drink at least 64oz of water daily.No sugary beverages.  -Irecommend joining silver sneakers and start to swim    To start on zepbound.  To start on initial dose of medication and then to titrate as tolerated.  They have tried more than 6 months of lifestyle modifications including diet and activity changes and has had insignificant weight loss of less than 1 lb a week. Patient denies personal and family history of MCT and MEN2 tumors. Patient denies personal history of pancreatitis. Side effects discussed but not limited to diarrhea, bloating, constipation, GI upset, heartburn, increased heart rate, headache, low blood sugar, fatigue and dizziness. Titration and medication administration discussed.  Medication agreement signed 12/17/24    Initial Weight:288.4  Goal Weight:185

## 2024-12-19 ENCOUNTER — TELEPHONE (OUTPATIENT)
Dept: BARIATRICS | Facility: CLINIC | Age: 59
End: 2024-12-19

## 2024-12-19 NOTE — TELEPHONE ENCOUNTER
PA for Zepbound 2.5mg  DENIED    Reason:(Screenshot if applicable)    Drugs used to help you lose weight are excluded from Medicare Part D coverage     Message sent to office clinical pool Yes    Denial letter scanned into Media Yes    Appeal started No- will submit to secondary insurance (Provider will need to decide if appeal is warranted and send clinical documentation to Prior Authorization Team for initiation.)    **Please follow up with your patient regarding denial and next steps**

## 2024-12-19 NOTE — TELEPHONE ENCOUNTER
PA for Zepbound 2.5mg SUBMITTED to [email or web address]     via    [x]CMM-KEY: [id number]  []Surescripts-Case ID #   []Availity-Auth ID # NDC #   []Faxed to plan   []Other website   []Phone call Case ID #     []PA sent as URGENT    All office notes, labs and other pertaining documents and studies sent. Clinical questions answered. Awaiting determination from insurance company.     Turnaround time for your insurance to make a decision on your Prior Authorization can take 7-21 business days.

## 2024-12-19 NOTE — TELEPHONE ENCOUNTER
PA for Zepbound 2.5mg SUBMITTED to [employer]     via    [x]CMM-KEY: [id number]  []Surescripts-Case ID #   []Availity-Auth ID # NDC #   []Faxed to plan   []Other website   []Phone call Case ID #     []PA sent as URGENT    All office notes, labs and other pertaining documents and studies sent. Clinical questions answered. Awaiting determination from insurance company.     Turnaround time for your insurance to make a decision on your Prior Authorization can take 7-21 business days.

## 2024-12-19 NOTE — TELEPHONE ENCOUNTER
PA for Zepbound 2.5mg  APPROVED     Date(s) approved 12/19/2024 - 6/19/2025    Case #    Patient advised by          []Progin[employer]art Message  [x]Phone call   []LMOM  []L/M to call office as no active Communication consent on file  []Unable to leave detailed message as VM not approved on Communication consent       Pharmacy advised by    [x]Fax  []Phone call    Approval letter scanned into Media Yes

## 2024-12-23 ENCOUNTER — HOSPITAL ENCOUNTER (OUTPATIENT)
Dept: RADIOLOGY | Facility: HOSPITAL | Age: 59
Discharge: HOME | End: 2024-12-23
Attending: PHYSICIAN ASSISTANT
Payer: COMMERCIAL

## 2024-12-23 DIAGNOSIS — D33.3 VESTIBULAR SCHWANNOMA (CMS/HCC): ICD-10-CM

## 2024-12-23 RX ORDER — GADOBUTROL 604.72 MG/ML
13.1 INJECTION INTRAVENOUS ONCE
Status: COMPLETED | OUTPATIENT
Start: 2024-12-23 | End: 2024-12-23

## 2024-12-23 RX ADMIN — GADOBUTROL 13.1 ML: 604.72 INJECTION INTRAVENOUS at 14:38

## 2024-12-27 ENCOUNTER — NURSE TRIAGE (OUTPATIENT)
Dept: FAMILY MEDICINE | Facility: CLINIC | Age: 59
End: 2024-12-27
Payer: COMMERCIAL

## 2024-12-27 NOTE — TELEPHONE ENCOUNTER
- please call pt to schedule an appt here for shoulder eval.      I called and spoke with pt to get more info.  States she is seeing Judy for leg issues and will not see her for her shoulder until her leg is resolved.  Unable to have her leg resolved because she needs to lose weight first per pt.     Pt would like to get mri of shoulder.  Advised that she needs an appt for eval as we will need documentation for prior authorization.    Did advise that if she is in that much pain, she should go to UC or ER as well.

## 2024-12-27 NOTE — TELEPHONE ENCOUNTER
Regarding: severe shoulder  ----- Message from Felix KRUGER sent at 12/27/2024  9:37 AM EST -----  Red Flag: Patient is having severe right shoulder pain.

## 2024-12-27 NOTE — TELEPHONE ENCOUNTER
"Patient transferred to the Primary Care Telephonic Nurse Triage Line with complaints of shoulder pain     HPI:  Pt triaged for ongoing right shoudler pain that patient says has been x 2mos. Pt reports concern of a) could not complete recent MRI, became closterphobic and could not finish b) is she taking too much ibuprofen. Pt reports usually taking 400mg in am and 800mg in pm. Pt says that the MRI was at [facility] and that the tech put in new order for her to go to [place] since open MRI. Chart sent to office staff for further assistance. Insurance and phone number confirmed.     Disposition:  Information or Advice Only Call    Care Advice:  Care Advice Given       No Care Advice given for this encounter.           Answer Assessment - Initial Assessment Questions  1. ONSET: \"When did the pain start?\"      2 mos   2. LOCATION: \"Where is the pain located?\"      right shoulder   3. PAIN: \"How bad is the pain?\" (Scale 1-10; or mild, moderate, severe)      pt says taken 800mg qpm, [id number] mg during   4. WORK OR EXERCISE: \"Has there been any recent work or exercise that involved this part of the body?\"        5. CAUSE: \"What do you think is causing the shoulder pain?\"      pt says when restrained   6. OTHER SYMPTOMS: \"Do you have any other symptoms?\" (e.g., neck pain, swelling, rash, fever, numbness, weakness)      ortho says rotator cuff tear  7. PREGNANCY: \"Is there any chance you are pregnant?\" \"When was your last menstrual period?\"      needs open MRI now  1200 total    Protocols used: Shoulder Pain-Adult-[place]    "

## 2025-01-08 ENCOUNTER — TELEPHONE (OUTPATIENT)
Age: 60
End: 2025-01-08

## 2025-01-08 NOTE — TELEPHONE ENCOUNTER
Pharmacy told pt that they still don't have approval from her ins for the Zepbound.  I see the PA approval on file so I called the pharmacy with the pt on the line.  The pharmacist was not available but we left a message letting them know they need to run it under her secondary ins not her medicare.

## 2025-01-14 NOTE — PROGRESS NOTES
Patient preferred to follow-up locally. Plan for repeat imaging in 1 year. Reviewed imaging and plan with Dr. Varela. Spoke with patient. Questions answered. She will call with any additional questions or concerns.

## 2025-01-28 ENCOUNTER — TELEPHONE (OUTPATIENT)
Dept: BARIATRICS | Facility: CLINIC | Age: 60
End: 2025-01-28

## 2025-01-28 NOTE — TELEPHONE ENCOUNTER
Medication: Zepbound    Dose/Frequency: 5 mg weekly    Quantity: 2 ml    [phone]    Office:   [] PCP/Provider -   [x] Speciality/Provider - Oliverio Ruiz PA-C    Does the patient have enough for 3 days?   [x] Yes - Has two doses of 2.5 mg left  [] No - Send as HP to POD   *Patient having break through cravings. No other symptoms. Would like to go up to next dose*

## 2025-01-29 DIAGNOSIS — I10 ESSENTIAL HYPERTENSION: ICD-10-CM

## 2025-01-29 DIAGNOSIS — G47.33 OBSTRUCTIVE SLEEP APNEA SYNDROME: Primary | ICD-10-CM

## 2025-01-29 DIAGNOSIS — E66.01 MORBID (SEVERE) OBESITY DUE TO EXCESS CALORIES (HCC): ICD-10-CM

## 2025-01-29 RX ORDER — TIRZEPATIDE 5 MG/.5ML
5 INJECTION, SOLUTION SUBCUTANEOUS WEEKLY
Qty: 2 ML | Refills: 1 | Status: SHIPPED | OUTPATIENT
Start: 2025-01-29 | End: 2025-03-26

## 2025-02-06 DIAGNOSIS — E78.00 HYPERCHOLESTEROLEMIA: ICD-10-CM

## 2025-02-06 DIAGNOSIS — K21.9 GASTROESOPHAGEAL REFLUX DISEASE WITHOUT ESOPHAGITIS: ICD-10-CM

## 2025-02-06 DIAGNOSIS — I10 ESSENTIAL HYPERTENSION: ICD-10-CM

## 2025-02-06 RX ORDER — OMEPRAZOLE 20 MG/1
20 CAPSULE, DELAYED RELEASE ORAL
Qty: 90 CAPSULE | Refills: 1 | Status: SHIPPED | OUTPATIENT
Start: 2025-02-06

## 2025-02-06 RX ORDER — ATORVASTATIN CALCIUM 10 MG/1
10 TABLET, FILM COATED ORAL NIGHTLY
Qty: 90 TABLET | Refills: 1 | Status: SHIPPED | OUTPATIENT
Start: 2025-02-06

## 2025-02-06 RX ORDER — LOSARTAN POTASSIUM 100 MG/1
100 TABLET ORAL DAILY
Qty: 90 TABLET | Refills: 1 | Status: SHIPPED | OUTPATIENT
Start: 2025-02-06 | End: 2025-08-05

## 2025-02-13 ENCOUNTER — TELEPHONE (OUTPATIENT)
Dept: FAMILY MEDICINE | Facility: CLINIC | Age: 60
End: 2025-02-13

## 2025-02-13 NOTE — TELEPHONE ENCOUNTER
Request for Medical Advice (NON-URGENT)   Patient PCP: Prosper Grullon MD  New or Existing Issue: Existing  Question or Concern: Pt requesting copy of prescription for albuteral dated in 2021. She is trying to show proof her asthma was developing again at that time.    Best contact number: [phone]     The practice will reach out to discuss your Medical Question or Concern within 2 business days.

## 2025-02-14 NOTE — TELEPHONE ENCOUNTER
No prescriptions from our office in 2021.  Unsure if she got from the ED or UC?  She should be able to get this information from the pharmacy

## 2025-02-19 ENCOUNTER — NURSE TRIAGE (OUTPATIENT)
Dept: OTHER | Facility: OTHER | Age: 60
End: 2025-02-19

## 2025-02-19 NOTE — TELEPHONE ENCOUNTER
"Regarding: zepbound medication issue / pricked finger with needle  ----- Message from Radha LANDRUM sent at 2/19/2025  6:48 PM EST -----  \"I was trying to give myself my .5 of my zepbound and I didn't realize I was doing it upside down and I pricked my finger with the needle\"    "

## 2025-02-20 ENCOUNTER — TELEPHONE (OUTPATIENT)
Age: 60
End: 2025-02-20

## 2025-02-20 NOTE — TELEPHONE ENCOUNTER
Patient calling in stating she injected her Zepbound 5 mg/0.5 mL auto-injector into her right index finger because she didn't realize the injector was upside down. This happened at 3pm and she was wondering if there was anything to look out for signs or symptom wise and if she would need a new injector prescription. I contacted the on call provider Dr. Julio Garcia via secure chat with her questions.      Per the on call provider monitor for swelling and bleeding and call the office tomorrow morning for further instructions regarding her other questions.  I called patient back with these recommendations and she was agreeable.

## 2025-02-20 NOTE — TELEPHONE ENCOUNTER
Can you let her know that it should be ok. It has happened to other patients before .  She may have some mild swelling. If she does get redness, increase in pain or fever or notices an increase in swelling she should let us know

## 2025-02-20 NOTE — TELEPHONE ENCOUNTER
"Reason for Disposition  • [1] Caller has URGENT medicine question about med that PCP or specialist prescribed AND [2] triager unable to answer question    Answer Assessment - Initial Assessment Questions  1. NAME of MEDICINE: \"What medicine(s) are you calling about?\"        tirzepatide (Zepbound) 5 mg/0.5 mL auto-injector     2. QUESTION: \"What is your question?\" (e.g., double dose of medicine, side effect)        Patient injected medicine into right index finger, should she be worried for signs or symptoms and is this dose wasted?    3. PRESCRIBER: \"Who prescribed the medicine?\" Reason: if prescribed by specialist, call should be referred to that group.        Oliverio Ruiz PA-C    4. SYMPTOMS: \"Do you have any symptoms?\" If Yes, ask: \"What symptoms are you having?\"  \"How bad are the symptoms (e.g., mild, moderate, severe)        Denies    5. PREGNANCY:  \"Is there any chance that you are pregnant?\" \"When was your last menstrual period?\"        Denies    Protocols used: Medication Question Call-Adult-    "

## 2025-02-20 NOTE — TELEPHONE ENCOUNTER
SUJEY: Patient of Oliverio Ruiz PA-C. Currently on Zepbound 5 mg. Went to give herself her injection yesterday and accidentally stuck her self in the finger. No side effects. Information given to patient to contact [facility] to receive voucher for pen replacement.

## 2025-03-20 ENCOUNTER — OFFICE VISIT (OUTPATIENT)
Dept: BARIATRICS | Facility: CLINIC | Age: 60
End: 2025-03-20
Payer: COMMERCIAL

## 2025-03-20 VITALS
DIASTOLIC BLOOD PRESSURE: 70 MMHG | HEART RATE: 80 BPM | RESPIRATION RATE: 16 BRPM | WEIGHT: 263 LBS | HEIGHT: 68 IN | BODY MASS INDEX: 39.86 KG/M2 | SYSTOLIC BLOOD PRESSURE: 110 MMHG | TEMPERATURE: 98.6 F

## 2025-03-20 DIAGNOSIS — I10 ESSENTIAL HYPERTENSION: ICD-10-CM

## 2025-03-20 DIAGNOSIS — G47.33 OBSTRUCTIVE SLEEP APNEA SYNDROME: ICD-10-CM

## 2025-03-20 DIAGNOSIS — E66.01 MORBID (SEVERE) OBESITY DUE TO EXCESS CALORIES (HCC): ICD-10-CM

## 2025-03-20 DIAGNOSIS — E66.813 CLASS 3 SEVERE OBESITY DUE TO EXCESS CALORIES WITH BODY MASS INDEX (BMI) OF 40.0 TO 44.9 IN ADULT (HCC): Primary | ICD-10-CM

## 2025-03-20 DIAGNOSIS — E66.01 CLASS 3 SEVERE OBESITY DUE TO EXCESS CALORIES WITH BODY MASS INDEX (BMI) OF 40.0 TO 44.9 IN ADULT (HCC): Primary | ICD-10-CM

## 2025-03-20 PROCEDURE — 99214 OFFICE O/P EST MOD 30 MIN: CPT | Performed by: STUDENT IN AN ORGANIZED HEALTH CARE EDUCATION/TRAINING PROGRAM

## 2025-03-20 RX ORDER — ECHINACEA PURPUREA EXTRACT 125 MG
1 TABLET ORAL EVERY 6 HOURS PRN
COMMUNITY
Start: 2024-10-22

## 2025-03-20 RX ORDER — CEPHALEXIN 500 MG/1
1 CAPSULE ORAL 2 TIMES DAILY
COMMUNITY
Start: 2025-02-12

## 2025-03-20 RX ORDER — VENLAFAXINE HYDROCHLORIDE 150 MG/1
CAPSULE, EXTENDED RELEASE ORAL
COMMUNITY

## 2025-03-20 RX ORDER — TIRZEPATIDE 5 MG/.5ML
5 INJECTION, SOLUTION SUBCUTANEOUS WEEKLY
Qty: 2 ML | Refills: 4 | Status: SHIPPED | OUTPATIENT
Start: 2025-03-20

## 2025-03-20 RX ORDER — HYDROCODONE BITARTRATE AND ACETAMINOPHEN 7.5; 325 MG/1; MG/1
1 TABLET ORAL EVERY 6 HOURS PRN
COMMUNITY
Start: 2025-02-12

## 2025-03-20 RX ORDER — PRAZOSIN HYDROCHLORIDE 2 MG/1
CAPSULE ORAL EVERY 24 HOURS
COMMUNITY

## 2025-03-20 NOTE — PROGRESS NOTES
Assessment & Plan  Class 3 Obesity    Obstructive sleep apnea syndrome    Initial weight: 288 (12/17/24)   Current weight: 263 (3/20/24)     TBW loss%:8.6    Medication: Continue  Zepbound 5 mg     Patient understands the importance of making lifestyle changes as recommended below to aid in weight loss.        Dietary Recommendations:  Recommend to avoid skipping any meals. Adequate amount of Macronutrients (minimal 3 meals a day) is necessary to help improve metabolism, satiety and allow for better portion control by decreasing Ghrelin (hunger hormone). Lack of hunger can be suppressed by a hormone called Leptin (full hormone) which can occur from a previous meal or caffeine intake    Protein intake throughout the day can help promote satiety and is necessary for muscle growth/repair    Carbohydrates are essential as it is the vital source of fuel for daily activities. energy, cell function, nutrient absorption, and hormone production.     Fats: Essential vitamins like A, D, and E, support cell growth, function and are necessary for nutrient absorption to support your organs     Fluid intake which is at least half your body weight in ounces is necessary to help control cravings (decreasing confusion for appetite vs water deprivation) as the human body is made up of 50-70% of Fluids. If there is a diversion for water alone, would recommend flavored water (example-splash of lemonade or ice tea) to help promote compliance. Fluids include Teas, water, flavored water, seltzer water, coffee, shakes    Metabolism:    Metabolism can also be promoted by macronutrient intake and increased muscle weight via thermogenesis      Daily Calorie Needs: Recommend to take into account any fluid losses and calories burned via increased activity levels as daily calories may need to be adjusted     Weight check: Weights can fluctuate depending on fluid shifts vs what foods are consumed prior to checking your weight          No  "follow-ups on file.     Most recent notes, labs and previous medical records were reviewed. Total time with chart review and with the patient: 35 min      ______________________________________________________________________        Subjective:     Chief Complaint   Patient presents with    Follow-up     MWM-3M F/u; Waist-52in       HPI: 59 y.o. female with pmh of  MARISEL, steatosis, prediabetes, hypertension presents for follow-up    Current Medication: Zepbound 5mg       Dietary Regimen:  Breakfast: Eggs   Dinner: Chicken broccoli           Cravings: cookies         Fluids: Water: 7-8 glasses     Review Of Systems:  General: No pallor, no weakness   Pulmonary: Negative for shortness of breath  Chest: negative for chest pain  Gastrointestinal:  Negative for abdominal pain, diarrhea   Psychiatric/Behavioral:  Negative for behavioral problems, confusion, dysphoric mood and hallucinations.    All other systems reviewed and are negative.     Objective:  /70   Pulse 80   Temp 98.6 °F (37 °C)   Resp 16   Ht 5' 7.75\" (1.721 m)   Wt 119 kg (263 lb)   BMI 40.28 kg/m²     Wt Readings from Last 30 Encounters:   03/20/25 119 kg (263 lb)   12/17/24 131 kg (288 lb 6.4 oz)       Physical Exam  Constitutional:       General: No acute distress.  Well-nourished  HENT:      Head: Normocephalic and atraumatic.   Eyes:      Extraocular Movements: Extraocular movements intact.      Conjunctiva/pupils: Conjunctivae normal. Pupils are equal, round  Pulmonary:      Effort: Pulmonary effort is normal. No labored breathing   Neurological:      General: No focal deficit present.  AO x 3     Mental Status: Alert and oriented to person, place, and time. Mental status is at baseline.   Psychiatric:         Mood and Affect: Mood normal.         Behavior: Behavior normal.     Labs and Imaging  Recent labs and imaging have been personally reviewed.  "

## 2025-04-21 ENCOUNTER — TELEPHONE (OUTPATIENT)
Dept: FAMILY MEDICINE | Facility: CLINIC | Age: 60
End: 2025-04-21
Payer: COMMERCIAL

## 2025-04-21 DIAGNOSIS — Z12.31 ENCOUNTER FOR SCREENING MAMMOGRAM FOR MALIGNANT NEOPLASM OF BREAST: Primary | ICD-10-CM

## 2025-04-21 NOTE — TELEPHONE ENCOUNTER
Test Order Request   Patient PCP: Prosper Grullon MD  Next Office Visit: Visit date not found  Tests Requested: mammogram  , MyChart, or US Mail?: mychart    The practice will reach out to discuss your request within 2 business days.

## 2025-05-14 ENCOUNTER — HOSPITAL ENCOUNTER (OUTPATIENT)
Dept: RADIOLOGY | Age: 60
Discharge: HOME | End: 2025-05-14
Attending: FAMILY MEDICINE
Payer: COMMERCIAL

## 2025-05-14 DIAGNOSIS — Z12.31 ENCOUNTER FOR SCREENING MAMMOGRAM FOR MALIGNANT NEOPLASM OF BREAST: ICD-10-CM

## 2025-05-14 PROCEDURE — 77067 SCR MAMMO BI INCL CAD: CPT

## 2025-05-19 ENCOUNTER — RESULTS FOLLOW-UP (OUTPATIENT)
Dept: FAMILY MEDICINE | Facility: CLINIC | Age: 60
End: 2025-05-19
Payer: COMMERCIAL

## 2025-05-27 ENCOUNTER — TELEPHONE (OUTPATIENT)
Dept: FAMILY MEDICINE | Facility: CLINIC | Age: 60
End: 2025-05-27
Payer: COMMERCIAL

## 2025-06-10 ENCOUNTER — CONSULT (OUTPATIENT)
Dept: FAMILY MEDICINE | Facility: CLINIC | Age: 60
End: 2025-06-10
Payer: COMMERCIAL

## 2025-06-10 VITALS
TEMPERATURE: 97.8 F | BODY MASS INDEX: 38.8 KG/M2 | SYSTOLIC BLOOD PRESSURE: 130 MMHG | RESPIRATION RATE: 16 BRPM | HEIGHT: 68 IN | WEIGHT: 256 LBS | OXYGEN SATURATION: 98 % | DIASTOLIC BLOOD PRESSURE: 80 MMHG | HEART RATE: 70 BPM

## 2025-06-10 DIAGNOSIS — F19.11 HISTORY OF SUBSTANCE ABUSE (CMS/HCC): ICD-10-CM

## 2025-06-10 DIAGNOSIS — F33.9 EPISODE OF RECURRENT MAJOR DEPRESSIVE DISORDER, UNSPECIFIED DEPRESSION EPISODE SEVERITY (CMS/HCC): ICD-10-CM

## 2025-06-10 DIAGNOSIS — D33.3 VESTIBULAR SCHWANNOMA (CMS/HCC): ICD-10-CM

## 2025-06-10 DIAGNOSIS — F43.10 PTSD (POST-TRAUMATIC STRESS DISORDER): ICD-10-CM

## 2025-06-10 DIAGNOSIS — G89.29 CHRONIC PAIN OF LEFT KNEE: ICD-10-CM

## 2025-06-10 DIAGNOSIS — F31.10 BIPOLAR AFFECTIVE DISORDER, CURRENT EPISODE MANIC, CURRENT EPISODE SEVERITY UNSPECIFIED (CMS/HCC): ICD-10-CM

## 2025-06-10 DIAGNOSIS — Z01.818 PREOPERATIVE CLEARANCE: Primary | ICD-10-CM

## 2025-06-10 DIAGNOSIS — K21.9 GASTROESOPHAGEAL REFLUX DISEASE WITHOUT ESOPHAGITIS: ICD-10-CM

## 2025-06-10 DIAGNOSIS — F41.9 ANXIETY: ICD-10-CM

## 2025-06-10 DIAGNOSIS — N32.81 OVERACTIVE BLADDER: ICD-10-CM

## 2025-06-10 DIAGNOSIS — M25.562 CHRONIC PAIN OF LEFT KNEE: ICD-10-CM

## 2025-06-10 DIAGNOSIS — I10 ESSENTIAL HYPERTENSION: ICD-10-CM

## 2025-06-10 DIAGNOSIS — F20.3 UNDIFFERENTIATED SCHIZOPHRENIA (CMS/HCC): ICD-10-CM

## 2025-06-10 DIAGNOSIS — F14.20 COCAINE DEPENDENCE, UNCOMPLICATED (CMS/HCC): ICD-10-CM

## 2025-06-10 DIAGNOSIS — E78.00 HYPERCHOLESTEROLEMIA: ICD-10-CM

## 2025-06-10 PROCEDURE — 99214 OFFICE O/P EST MOD 30 MIN: CPT | Performed by: FAMILY MEDICINE

## 2025-06-10 PROCEDURE — 3008F BODY MASS INDEX DOCD: CPT | Performed by: FAMILY MEDICINE

## 2025-06-10 PROCEDURE — 3079F DIAST BP 80-89 MM HG: CPT | Performed by: FAMILY MEDICINE

## 2025-06-10 PROCEDURE — 3075F SYST BP GE 130 - 139MM HG: CPT | Performed by: FAMILY MEDICINE

## 2025-06-10 RX ORDER — ATORVASTATIN CALCIUM 10 MG/1
10 TABLET, FILM COATED ORAL NIGHTLY
Start: 2025-06-10

## 2025-06-10 RX ORDER — LOSARTAN POTASSIUM 100 MG/1
100 TABLET ORAL DAILY
Start: 2025-06-10 | End: 2025-12-07

## 2025-06-10 RX ORDER — QUETIAPINE FUMARATE 50 MG/1
50 TABLET, FILM COATED ORAL NIGHTLY
Start: 2025-06-10

## 2025-06-10 RX ORDER — DULOXETIN HYDROCHLORIDE 60 MG/1
60 CAPSULE, DELAYED RELEASE ORAL DAILY
Start: 2025-06-10

## 2025-06-10 RX ORDER — QUETIAPINE FUMARATE 100 MG/1
100 TABLET, FILM COATED ORAL NIGHTLY
Start: 2025-06-10

## 2025-06-10 RX ORDER — CLONIDINE HYDROCHLORIDE 0.1 MG/1
0.1 TABLET ORAL 2 TIMES DAILY
Start: 2025-06-10

## 2025-06-10 RX ORDER — OMEPRAZOLE 20 MG/1
20 CAPSULE, DELAYED RELEASE ORAL
Start: 2025-06-10

## 2025-06-10 RX ORDER — BUPROPION HYDROCHLORIDE 300 MG/1
300 TABLET ORAL DAILY
Start: 2025-06-10

## 2025-06-10 RX ORDER — MIRABEGRON 50 MG/1
50 TABLET, FILM COATED, EXTENDED RELEASE ORAL DAILY
Start: 2025-06-10

## 2025-06-10 RX ORDER — TIRZEPATIDE 5 MG/.5ML
5 INJECTION, SOLUTION SUBCUTANEOUS
COMMUNITY
Start: 2025-03-20

## 2025-06-10 NOTE — PROGRESS NOTES
Consent obtained from patient and all parties present in the room? yes    I have obtained the consent of everyone present in the room to make an audio recording of this visit to assist me in documenting the encounter in the EMR.     Subjective     Patient ID: Kar Nicholas, : 1965 is a 60 y.o. female who presents for Pre-op Exam    History of Present Illness  The patient presents for a preoperative evaluation.    She is scheduled for a left total knee arthroplasty on 2025 at [facility], to be performed by Dr. Frantz Vargas.     She has a history of right knee replacement, during which she experienced difficulty emerging from anesthesia. She reports no family history of anesthesia-related complications.     She has undergone preoperative testing, including an EKG and blood work.     She reports no chest pain, shortness of breath, palpitations, dizziness, lightheadedness, or syncope.    She was previously advised to lose 80 pounds but was unable to achieve this goal. However, she has successfully lost 47 pounds since 2024, even with frequent snacking. Her weight loss journey has been supported by Zepbound, prescribed by Dr. Raciel Martin at [facility], initially at a dose of 2.5 mg, now increased to 5 mg.    She is currently on duloxetine 30 mg, with plans to wean off the medication. She was previously on a higher dose of 90 mg. She is also taking losartan, omeprazole, atorvastatin, Myrbetriq, Seroquel 150 mg, Wellbutrin 300 mg, and clonidine 0.1 mg twice daily. She has discontinued naproxen and ibuprofen.    PAST SURGICAL HISTORY:  She has a history of partial hysterectomy, colonoscopy, breast reduction, and wisdom teeth extraction.    SOCIAL HISTORY  The patient quit smoking in November.    FAMILY HISTORY  The patient reports no family history of complications with anesthesia that she is aware of.    The following have been reviewed and updated as appropriate in this visit:   Tobacco   "Allergies  Meds  Problems  Med Hx  Surg Hx  Fam Hx         Objective   Vitals:    06/10/25 1033   BP: 130/80   BP Location: Left upper arm   Patient Position: Sitting   Pulse: 70   Resp: 16   Temp: 36.6 °C (97.8 °F)   TempSrc: Temporal   SpO2: 98%   Weight: 116 kg (256 lb)   Height: 1.727 m (5' 8\")     Body mass index is 38.92 kg/m².    Physical Exam  General: Appears well, smiling, no apparent distress  Respiratory: Clear to auscultation, no wheezing, rales or rhonchi  Cardiovascular: Regular rate and rhythm, no murmurs, rubs, or gallops    Results  Labs   - White blood cell count: 3.9   - Hemoglobin: 12.2   - Hematocrit: 37.7   - Electrolytes: Normal   - Kidney function: Normal   - Glucose: 94   - Liver enzymes: Normal   - PT/INR: Normal range   - PTT: Normal range   - Hemoglobin A1c: 5.5    Diagnostic Testing   - EKG: Normal sinus rhythm, T wave abnormality previously seen no longer present       Assessment & Plan  1. Preoperative evaluation.  - Weight loss of 47 pounds since 11/2024 will aid in recovery post-surgery.  - Zepbound cessation one week prior to the procedure is recommended.  - Inform the anesthesiologist about prolonged recovery from anesthesia during previous surgery.  - Tylenol is the only recommended medication for pain management leading up to the surgery.   - After examining the patient and reviewing the preoperative data, I find this patient to be medically stable for left total knee arthroplasty on 06/24/2025 at [facility], to be performed by Dr. Frantz Vargas.     2. Medication management.  - Currently on duloxetine 30 mg, losartan, omeprazole, atorvastatin, Myrbetriq, Seroquel 150 mg, Wellbutrin 300 mg, and clonidine 0.1 mg twice a day.  - Advised to continue medications as prescribed.    3. Laboratory results.  - EKG shows heart rate of 67, normal sinus rhythm, normal EKG, previously seen T wave abnormality no longer present.  - Blood work: WBC 3.9, hemoglobin 12.2, hematocrit " 37.7, electrolytes normal, kidney function excellent, glucose 94, liver enzymes normal, PT/INR normal, PTT normal.

## 2025-06-18 NOTE — PROGRESS NOTES
Assessment & Plan  Class 3 Obesity    Initial weight: 288 (12/17/24)   Previous weight: 263 (3/20/24)   Current weight: 252    TBW loss%: 12.5    Medication:  Zepbound 5 mg increased to 7.5     Patient understands the importance of making lifestyle changes as recommended below to aid in weight loss.        Dietary Recommendations:  Recommend to avoid skipping any meals. Adequate amount of Macronutrients (minimal 3 meals a day) is necessary to help improve metabolism, satiety and allow for better portion control by decreasing Ghrelin (hunger hormone). Lack of hunger can be suppressed by a hormone called Leptin (full hormone) which can occur from a previous meal or caffeine intake    Protein intake throughout the day can help promote satiety and is necessary for muscle growth/repair    Carbohydrates are essential as it is the vital source of fuel for daily activities. energy, cell function, nutrient absorption, and hormone production.     Fats: Essential vitamins like A, D, and E, support cell growth, function and are necessary for nutrient absorption to support your organs     Fluid intake which is at least half your body weight in ounces is necessary to help control cravings (decreasing confusion for appetite vs water deprivation) as the human body is made up of 50-70% of Fluids. If there is a diversion for water alone, would recommend flavored water (example-splash of lemonade or ice tea) to help promote compliance. Fluids include Teas, water, flavored water, seltzer water, coffee, shakes    Metabolism:    Metabolism can also be promoted by macronutrient intake and increased muscle weight via thermogenesis      Daily Calorie Needs: Recommend to take into account any fluid losses and calories burned via increased activity levels as daily calories may need to be adjusted     Weight check: Weights can fluctuate depending on fluid shifts vs what foods are consumed prior to checking your weight          Return in  "about 4 months (around 10/29/2025) for followup .     Most recent notes, labs and previous medical records were reviewed. Total time with chart review and with the patient: 35 min      ______________________________________________________________________        Subjective:     Chief Complaint   Patient presents with    Follow-up     MWM F/u; Waist 40in       HPI: 60 y.o. female with pmh of  MARISEL, steatosis, prediabetes, hypertension presents for follow-up    Current Medication: Zepbound 5mg    Dietary Regimen:  Breakfast: Eggs   Lunch: Protein shakes   Dinner: Chicken broccoli             Fluids: Water: 7-8 glasses     Review Of Systems:  General: No pallor, no weakness   Pulmonary: Negative for shortness of breath  Chest: negative for chest pain  Gastrointestinal:  Negative for abdominal pain, diarrhea   Psychiatric/Behavioral:  Negative for behavioral problems, confusion, dysphoric mood and hallucinations.    All other systems reviewed and are negative.     Objective:  /72 (BP Location: Left arm, Patient Position: Sitting)   Pulse 76   Temp 98.3 °F (36.8 °C) (Tympanic)   Resp 16   Ht 5' 5.75\" (1.67 m)   Wt 114 kg (252 lb 3.2 oz)   BMI 41.02 kg/m²     Wt Readings from Last 30 Encounters:   06/19/25 114 kg (252 lb 3.2 oz)   03/20/25 119 kg (263 lb)   12/17/24 131 kg (288 lb 6.4 oz)       Physical Exam  Constitutional:       General: No acute distress.  Well-nourished  HENT:      Head: Normocephalic and atraumatic.   Eyes:      Extraocular Movements: Extraocular movements intact.      Conjunctiva/pupils: Conjunctivae normal. Pupils are equal, round  Pulmonary:      Effort: Pulmonary effort is normal. No labored breathing   Neurological:      General: No focal deficit present.  AO x 3     Mental Status: Alert and oriented to person, place, and time. Mental status is at baseline.   Psychiatric:         Mood and Affect: Mood normal.         Behavior: Behavior normal.     Labs and Imaging  Recent labs and " imaging have been personally reviewed.

## 2025-06-19 ENCOUNTER — OFFICE VISIT (OUTPATIENT)
Dept: BARIATRICS | Facility: CLINIC | Age: 60
End: 2025-06-19
Payer: COMMERCIAL

## 2025-06-19 VITALS
WEIGHT: 252.2 LBS | HEIGHT: 66 IN | SYSTOLIC BLOOD PRESSURE: 108 MMHG | TEMPERATURE: 98.3 F | BODY MASS INDEX: 40.53 KG/M2 | RESPIRATION RATE: 16 BRPM | HEART RATE: 76 BPM | DIASTOLIC BLOOD PRESSURE: 72 MMHG

## 2025-06-19 DIAGNOSIS — E66.813 CLASS 3 SEVERE OBESITY DUE TO EXCESS CALORIES WITH BODY MASS INDEX (BMI) OF 40.0 TO 44.9 IN ADULT: Primary | ICD-10-CM

## 2025-06-19 DIAGNOSIS — G47.33 OBSTRUCTIVE SLEEP APNEA SYNDROME: ICD-10-CM

## 2025-06-19 DIAGNOSIS — I10 ESSENTIAL HYPERTENSION: ICD-10-CM

## 2025-06-19 PROCEDURE — 99214 OFFICE O/P EST MOD 30 MIN: CPT | Performed by: STUDENT IN AN ORGANIZED HEALTH CARE EDUCATION/TRAINING PROGRAM

## 2025-06-19 RX ORDER — TIRZEPATIDE 7.5 MG/.5ML
7.5 INJECTION, SOLUTION SUBCUTANEOUS WEEKLY
Qty: 2 ML | Refills: 5 | Status: SHIPPED | OUTPATIENT
Start: 2025-06-19

## 2025-06-23 ENCOUNTER — TELEPHONE (OUTPATIENT)
Dept: BARIATRICS | Facility: CLINIC | Age: 60
End: 2025-06-23

## 2025-06-23 NOTE — TELEPHONE ENCOUNTER
PA for zepbound 7.5mg SUBMITTED to [employer]    via    []CMM-KEY: [id number]  []Surescripts-Case ID #   []Availity-Auth ID # NDC #   []Faxed to plan   []Other website   []Phone call Case ID #     []PA sent as URGENT    All office notes, labs and other pertaining documents and studies sent. Clinical questions answered. Awaiting determination from insurance company.     Turnaround time for your insurance to make a decision on your Prior Authorization can take 7-21 business days.

## 2025-06-26 ENCOUNTER — PATIENT OUTREACH (OUTPATIENT)
Dept: CASE MANAGEMENT | Facility: CLINIC | Age: 60
End: 2025-06-26
Payer: COMMERCIAL

## 2025-06-26 RX ORDER — ACETAMINOPHEN 500 MG
1000 TABLET ORAL EVERY 8 HOURS
COMMUNITY
Start: 2025-06-25 | End: 2025-07-05

## 2025-06-26 RX ORDER — ASPIRIN 81 MG/1
81 TABLET ORAL 2 TIMES DAILY
COMMUNITY
Start: 2025-06-25 | End: 2025-07-25

## 2025-06-26 RX ORDER — AMOXICILLIN 250 MG
1 CAPSULE ORAL DAILY
COMMUNITY
Start: 2025-06-25 | End: 2025-07-25

## 2025-06-26 RX ORDER — OXYCODONE HYDROCHLORIDE 5 MG/1
5 TABLET ORAL EVERY 4 HOURS PRN
COMMUNITY
Start: 2025-06-25 | End: 2025-06-30

## 2025-06-26 ASSESSMENT — ENCOUNTER SYMPTOMS
ROS SKIN COMMENTS: L KNEE INCISION
ACTIVITY CHANGE: 1
CONSTIPATION: 1
WEAKNESS: 1

## 2025-06-26 NOTE — PROGRESS NOTES
STEFAN outreach, spoke to patient who states that she is working with the homecare nurses right now. Advised I would try her a little later today and she is agreeable.     Priscila Welsh, RN  Nurse Ambulatory Care Manager  [phone]

## 2025-06-26 NOTE — PROGRESS NOTES
NAME: Kar Nicholas    MRN: [id number]    YOB: 1965    Event Review:    Initial TCM Patient Outreach Date: 06/26/25    Assessment completed with: Patient  Patient stated reason for hospitalization: Scheduled surgery  Discharge Diagnosis: Primary osteoarthritis of left knee    Patient readmitted in the last 30 days: No  Discharging Facility: [facility]  Date of Last Admission: 06/24/25  Date of Last Discharge: 06/25/25    Patient's perception of their health status since discharge: Improving    Appointment Scheduling:    PCP appointment scheduled: No  Patient Scheduling Dispositions: Patient prefers to see specialist     HPI: Patient presented to [facility] for scheduled surgery.     She underwent left total knee arthroplasty with left proximal tibia bone lesion excision by Dr. Vargas. Her postoperative course was unremarkable and she was discharged home with [facility] home care RN/PT/OT.     Spoke to patient who states that she is doing well. States that she had some difficulty navigating around her home last night/early this morning, but had friends in the home that were able to help her. She is ambulating using an FWW but notes weakness in the lower extremities. She states there are still some areas of her legs that are numb after surgery. She is experiencing postoperative left knee pain for which pain medication is effective. Left knee incision is C/D/I. Home care RN was with her today and assessed the site. They also went over exercises she should be performing for continued improvement. She is tolerating an oral diet. She has not moved her bowels since discharge but is going to take stool softener today. She has orthopedic follow up booked early August, either 8/7 or 8/9 she could not recall exact date. She declined PCP follow up at this time    Review of Systems   Constitutional:  Positive for activity change.   Gastrointestinal:  Positive for  constipation.   Musculoskeletal:  Positive for gait problem.   Skin:         L knee incision   Neurological:  Positive for weakness.     Medication Review:    Medication Review: Yes  Reported by: Patient  Any new medications prescribed at discharge?: Yes  New prescriptions filled?: No  If No, please explain: Has not picked up medication (Daughter will bring home aspirin & acetaminophen)  Was a medication discrepancy indentified?: (!) Yes  If YES, Please explain: Patient taking Motrin. She was advised to stop; only use Acetaminophen  Nursing Interventions: Nurse provided patient education  Reconciled the current and discharge medications: Yes  Reviewed AVS (Discharge Instructions)?: Yes    Acute Pain:    Acute pain: Yes  Limitation of routine activities due to acute pain?: yes  Acute pain timing: intermittent  Location of acute pain: L knee    Chronic Pain:    Chronic pain: No    Diet/Nutrition:    Type of Diet: Regular  Diet Adherence: Adherent with diet    Home Care Services:    Home Care Agency: Other ([facility])  Type of Home Care Services: Home PT, Home OT, Home Nursing  Home Care Interventions: No intervention required     Post-Discharge Durable Medical Equipment::    Durable Medical Equipment: Front wheeled walker  Does patient's condition require a scale?: No  Oxygen Use: No  DME Interventions: No intervention required    Home Management:    Living Arrangement: Alone  Support System:: Child/Children  Type of Residence: Apartment  Home Monitoring: None  Any patient reported falls in the last 3 months?: No  Druze or spiritual beliefs that impact treatment?: No    Follow-Ups:    Relevant Specialist Follow-ups: Guera JONES/Orthopedics 8/7 or 8/9 (patient couldn't recall specific date)    Interventions/ Care Coordination:    Interventions/ Care Coordination: Provided resources to facilitate adherence, Addressed a knowledge deficit  General Education: Falls and home safety precautions,  Post-Operative instructions  Initiated Care Plan: STEFAN    Reviewed signs/symptoms of worsening condition or complication that necessitate a call to the Physician's office.  Educated patient on access to care.  RN phone number given for future care management.    Priscila Welsh RN  [phone]

## 2025-06-26 NOTE — TELEPHONE ENCOUNTER
PA for zepbound 7.5mg APPROVED     Date(s) approved good thru 12/31/2025    Case #    Patient advised by          [x]MyChart Message  []Phone call   []LMOM  []L/M to call office as no active Communication consent on file  []Unable to leave detailed message as VM not approved on Communication consent       Pharmacy advised by    [x]Fax  []Phone call  []Secure Chat    Specialty Pharmacy    []     Approval letter scanned into Media Yes

## 2025-07-03 ENCOUNTER — PATIENT OUTREACH (OUTPATIENT)
Dept: CASE MANAGEMENT | Facility: CLINIC | Age: 60
End: 2025-07-03
Payer: COMMERCIAL

## 2025-07-03 NOTE — PROGRESS NOTES
STEFAN follow up outreach     Spoke to patient who stater that she is recovering at home, still in some pain today. She thinks she pulled a muscle, the homecare team assessed and felt it should self resolve in a few days. She is able to ambulate using FWW. She continues with home RN/PT/OT services. She declines any needs from PCP at this time.    Care management available for future needs.    Priscila Welsh RN  Nurse Ambulatory Care Manager  [phone]    Care Plan: Transition of Care Template   Updates made by Priscila Welsh RN since 7/3/2025 12:00 AM        Problem: Progression and Care Needs         Goal: Patient will follow up with treatment and diagnostic services as medically indicated on discharge summary         Task: Provide assistance with coordination of services as needed Completed 7/3/2025   Responsible User: Priscila Welsh RN        Problem: Infection Prevention         Goal: Patient will verbalize understanding of their diagnosis as it relates to infection prevention to remain free from complications         Task: Educate patient on signs and symptoms of infection to report to clinician Completed 7/3/2025   Responsible User: Priscila Welsh RN

## 2025-07-24 ENCOUNTER — TELEPHONE (OUTPATIENT)
Dept: SCHEDULING | Age: 60
End: 2025-07-24
Payer: COMMERCIAL

## 2025-07-24 DIAGNOSIS — E78.00 HYPERCHOLESTEROLEMIA: ICD-10-CM

## 2025-07-24 DIAGNOSIS — I10 ESSENTIAL HYPERTENSION: ICD-10-CM

## 2025-07-24 DIAGNOSIS — K21.9 GASTROESOPHAGEAL REFLUX DISEASE WITHOUT ESOPHAGITIS: ICD-10-CM

## 2025-07-24 RX ORDER — LOSARTAN POTASSIUM 100 MG/1
100 TABLET ORAL DAILY
Qty: 90 TABLET | Refills: 0 | Status: SHIPPED | OUTPATIENT
Start: 2025-07-24 | End: 2026-01-20

## 2025-07-24 RX ORDER — ATORVASTATIN CALCIUM 10 MG/1
10 TABLET, FILM COATED ORAL NIGHTLY
Qty: 90 TABLET | Refills: 0 | Status: SHIPPED | OUTPATIENT
Start: 2025-07-24

## 2025-07-24 RX ORDER — ALBUTEROL SULFATE 90 UG/1
2 INHALANT RESPIRATORY (INHALATION) EVERY 6 HOURS PRN
Qty: 18 G | Refills: 0 | Status: SHIPPED | OUTPATIENT
Start: 2025-07-24

## 2025-07-24 RX ORDER — OMEPRAZOLE 20 MG/1
20 CAPSULE, DELAYED RELEASE ORAL
Qty: 90 CAPSULE | Refills: 0 | Status: SHIPPED | OUTPATIENT
Start: 2025-07-24

## 2025-07-24 NOTE — TELEPHONE ENCOUNTER
Medication Request   Patient PCP: Prosper Grullon MD  Next Office Visit: Visit date not found  Has this provider prescribed this medication before?: yes  Medication Name: Lipitor, Prilosec, Carapres, Abuterol, Lesartan  Medication Dose: all  Medication Frequency: differ    Pt needs refills sent in for all these meds. The pharmacy has changed and she needs the script sent to [facility] instead.     Pref[address] Pharmacy:   [address] [address]  Phone: [phone] Fax: [phone]      Please allow 2 business days for your provider to send your medication request or to reach out to discuss.

## 2025-08-04 NOTE — PROGRESS NOTES
Consent obtained from patient and all parties present in the room? yes    I have obtained the consent of everyone present in the room to make an audio recording of this visit to assist me in documenting the encounter in the EMR.     Subjective     Patient ID: Kar Nicholas, : 1965 is a 60 y.o. female who presents for Skin - Lump Or Localized Swelling    History of Present Illness  The patient presents for weight management, knee pain, and a lump in the pubic area.    She reports significant knee pain following her recent knee replacement surgery. Despite the pain, she states that her leg is functioning well with good range of motion. She is currently undergoing physical therapy and plans to request an additional prescription to aid in her recovery. She acknowledges consuming a high amount of ibuprofen to manage the pain and notes some muscle weakness in the area.    She has noticed a lump in her pubic hair region, which has been present for approximately 30 years. The lump has not caused any discomfort or issues. She recalls a previous incident where a friend had a similar issue that turned out to be cancerous, leading to her current concern. She mentions that the lump has not changed in size over the past 10 to 15 years. She has a history of pilonidal cysts at the base of her spine, which required emergency room visits due to their impact on her mobility. These cysts were drained on four occasions. During one of these procedures, she was informed that the presence of scar tissue would prevent further intervention.    Her weight has been decreasing since her knee replacement surgery. She is currently on Zepbound 7.5 mg, which was recently increased from 5 mg about 2 weeks ago.    PAST SURGICAL HISTORY:  Knee replacement surgery  Drainage of pilonidal cysts (four occasions)    The following have been reviewed and updated as appropriate in this visit:   Allergies  Meds  Problems         Objective  "  Vitals:    08/05/25 0926   BP: 110/70   BP Location: Left upper arm   Patient Position: Sitting   Pulse: 63   Resp: 16   Temp: 36.6 °C (97.8 °F)   TempSrc: Temporal   SpO2: 100%   Weight: 112 kg (248 lb)   Height: 1.727 m (5' 8\")     Body mass index is 37.71 kg/m².    Physical Exam  General: Patient appears in good physical condition.  Other: A lump is present in the pubic hair region, approximately nickel-sized, suspected to be a cyst.    Results  Imaging   - CT scan: 2022, No abnormalities       Assessment & Plan  1. Weight management.  - Weight has decreased from 296 pounds in 11/2024 to 248 pounds today.  - Currently on Zepbound 7.5 mg, recently increased from 5 mg about 2 weeks ago.  - Dosage can be increased up to 15 mg if needed.  - Positive progress noted in weight reduction.    2. Knee pain.  - Reports significant pain in the knee and frequent use of ibuprofen.  - Advised to consult her doctor for a prescription to manage pain during physical therapy.  - Physical therapy is ongoing, with some muscle weakness still present.  - Motion and overall leg condition are improving.    3. Lump on pubic area.  - Lump has been present for 30 years, with no recent changes in size.  - CT scan in 2022 did not reveal any abnormalities.  - Suspected to be a cyst; ultrasound ordered to confirm diagnosis.  - If confirmed as a cyst and remains unchanged, no further action is necessary. Referral to a dermatologist or surgeon can be considered if it becomes inflamed or painful.         "

## 2025-08-05 ENCOUNTER — OFFICE VISIT (OUTPATIENT)
Dept: FAMILY MEDICINE | Facility: CLINIC | Age: 60
End: 2025-08-05
Payer: COMMERCIAL

## 2025-08-05 VITALS
DIASTOLIC BLOOD PRESSURE: 70 MMHG | OXYGEN SATURATION: 100 % | WEIGHT: 248 LBS | TEMPERATURE: 97.8 F | BODY MASS INDEX: 37.59 KG/M2 | RESPIRATION RATE: 16 BRPM | HEART RATE: 63 BPM | SYSTOLIC BLOOD PRESSURE: 110 MMHG | HEIGHT: 68 IN

## 2025-08-05 DIAGNOSIS — E66.812 CLASS 2 SEVERE OBESITY DUE TO EXCESS CALORIES WITH SERIOUS COMORBIDITY AND BODY MASS INDEX (BMI) OF 37.0 TO 37.9 IN ADULT (CMS/HCC): ICD-10-CM

## 2025-08-05 DIAGNOSIS — R19.07 GENERALIZED SWELLING, MASS, OR LUMP OF ABDOMEN OR PELVIS: Primary | ICD-10-CM

## 2025-08-05 DIAGNOSIS — D33.3 VESTIBULAR SCHWANNOMA (CMS/HCC): ICD-10-CM

## 2025-08-05 DIAGNOSIS — Z96.652 STATUS POST TOTAL LEFT KNEE REPLACEMENT: ICD-10-CM

## 2025-08-05 DIAGNOSIS — E66.01 CLASS 2 SEVERE OBESITY DUE TO EXCESS CALORIES WITH SERIOUS COMORBIDITY AND BODY MASS INDEX (BMI) OF 37.0 TO 37.9 IN ADULT (CMS/HCC): ICD-10-CM

## 2025-08-05 PROCEDURE — 99214 OFFICE O/P EST MOD 30 MIN: CPT | Performed by: FAMILY MEDICINE

## 2025-08-05 PROCEDURE — 3078F DIAST BP <80 MM HG: CPT | Performed by: FAMILY MEDICINE

## 2025-08-05 PROCEDURE — 3008F BODY MASS INDEX DOCD: CPT | Performed by: FAMILY MEDICINE

## 2025-08-05 PROCEDURE — 3074F SYST BP LT 130 MM HG: CPT | Performed by: FAMILY MEDICINE

## 2025-08-05 RX ORDER — TIRZEPATIDE 7.5 MG/.5ML
7.5 INJECTION, SOLUTION SUBCUTANEOUS
Start: 2025-08-05

## 2025-08-06 ENCOUNTER — TELEPHONE (OUTPATIENT)
Dept: FAMILY MEDICINE | Facility: CLINIC | Age: 60
End: 2025-08-06
Payer: COMMERCIAL

## 2025-08-06 NOTE — TELEPHONE ENCOUNTER
Carmen from [facility] called to follow up on form she faxed over regarding the cymbalta. She needs it signed and faxed back. She will re-fax again today    Carmen can be reached at [phone]

## 2025-08-06 NOTE — TELEPHONE ENCOUNTER
I do not have a form.      Also, this is a medication that is managed by her psychiatrist, not me

## (undated) DEVICE — KIT CEMENT MIXING CARTRIDGE AND NOZZLE

## (undated) DEVICE — SET HANDPIECE INTERPULSE

## (undated) DEVICE — SCISSORS TIPS SNOWDEN

## (undated) DEVICE — BATTERY SYSTEM 7

## (undated) DEVICE — CONVERTIBLE TROCAR SYSTEM 12MM

## (undated) DEVICE — GLOVE SZ 7.5 LINER PROTEXIS PI BL

## (undated) DEVICE — E-TRAP POLYECTOMY

## (undated) DEVICE — PAD GROUND ELECTROSURGICAL W/CORD

## (undated) DEVICE — Device

## (undated) DEVICE — NEEDLE DISP SPINAL 22GX3-1/2IN

## (undated) DEVICE — GOWN SURG X-LARGE MICROCOOL

## (undated) DEVICE — ADHESIVE SKIN DERMABOND ADVANCED 0.7ML

## (undated) DEVICE — APPLICATOR CHLORAPREP 26ML ORANGE TINT

## (undated) DEVICE — PENEVAC1 NONSTICK SMOKE EVAC

## (undated) DEVICE — SNARE POLYPECTOMY 15MM

## (undated) DEVICE — PACK DRAPE TABLE

## (undated) DEVICE — SOLN IRRIG .9%SOD 1000ML

## (undated) DEVICE — SUTURE VICRYL 2-0 J375H UR-5 VIOLET

## (undated) DEVICE — DRAPE SPIDER2 SWITCH

## (undated) DEVICE — ***USE 57698*** SLEEVE FLOWTRON DVT CALF SINGLE USE

## (undated) DEVICE — DRAPE LARGE REVERSE FOLD

## (undated) DEVICE — MANIFOLD FOUR PORT NEPTUNE

## (undated) DEVICE — MANIFOLD SINGLE PORT NEPTUNE

## (undated) DEVICE — SOLN IRRIG STER WATER 1000ML

## (undated) DEVICE — GLOVE PROTEXIS PI ORTHO 9.0

## (undated) DEVICE — CONTAINER SPECIMEN STERILE 5OZ

## (undated) DEVICE — VEST STERILE

## (undated) DEVICE — BAG DECANTER

## (undated) DEVICE — ***USE 121490***PACK KNEE ADD ON

## (undated) DEVICE — SUTURE QUILL PDO 0 RA-1067Q

## (undated) DEVICE — SUTURE QUILL PDO 2 RX-1066Q

## (undated) DEVICE — SUCTION 18FR FRAZIER DISPOSABLE

## (undated) DEVICE — TROCAR BLADED 11 X 100MM Z-THREAD

## (undated) DEVICE — CUFF TOURNIQUET DISP 34 X 4

## (undated) DEVICE — WRAP COBAN ADHERENT 6 X 5YDS STERILE

## (undated) DEVICE — FORCEP COLD RADIAL JAW

## (undated) DEVICE — PACK UNIVERSAL TOTAL JOINT

## (undated) DEVICE — SYRINGE DISP LUER-LOK 20 CC

## (undated) DEVICE — HOOD FLYTE SURGICOOL

## (undated) DEVICE — BLADE SAGGITAL FLARED 2108-382-3

## (undated) DEVICE — SYSTEM LABELING CORRECT MEDICATION

## (undated) DEVICE — GRASPER GATOR MINILAP

## (undated) DEVICE — CORD LAPAROSCOPIC DISP STERILE

## (undated) DEVICE — NEEDLE HYPODERMIC PRO 18G X 1 1/2 IN

## (undated) DEVICE — CLOTH PREPPING SAGE 2% CHG 2/PK

## (undated) DEVICE — KIT LEG STABILIZATION SPIDER

## (undated) DEVICE — MOUTHPIECE 60FR ENDO BITEBLOCK

## (undated) DEVICE — TUBING INSUFFLATION PNEUMOSURE HIGH FLOW

## (undated) DEVICE — MASTISOL LIQUID ADHESIVE

## (undated) DEVICE — DISSECTOR ENDO BLUNT TIP 10MM

## (undated) DEVICE — BANDAGE FLEX MASTER  6IN

## (undated) DEVICE — TIP COAXIAL FEMORAL CANAL

## (undated) DEVICE — TROCAR HASSON 12MM

## (undated) DEVICE — DRESSING MEPILEX BORDER AG 4X8

## (undated) DEVICE — ***USE 56840*** APPLIER ENDOCLIP M/L ER320

## (undated) DEVICE — GLOVE LINER PROTEXIS 9.0 PI BLUE

## (undated) DEVICE — KIT CEMENT SCULPS

## (undated) DEVICE — GOWN SURGICAL MICROCOOL IMPERVIOUS XXL